# Patient Record
Sex: MALE | Race: WHITE | NOT HISPANIC OR LATINO | Employment: OTHER | URBAN - METROPOLITAN AREA
[De-identification: names, ages, dates, MRNs, and addresses within clinical notes are randomized per-mention and may not be internally consistent; named-entity substitution may affect disease eponyms.]

---

## 2019-06-14 RX ORDER — HYDROCHLOROTHIAZIDE 12.5 MG/1
TABLET ORAL
Refills: 0 | COMMUNITY
Start: 2019-03-22 | End: 2020-08-04

## 2019-06-14 RX ORDER — INSULIN ASPART 100 [IU]/ML
INJECTION, SOLUTION INTRAVENOUS; SUBCUTANEOUS
Refills: 3 | COMMUNITY
Start: 2019-04-15 | End: 2020-08-04

## 2019-06-14 RX ORDER — GLIPIZIDE 5 MG/1
TABLET ORAL
Refills: 3 | COMMUNITY
Start: 2019-03-31 | End: 2020-08-04

## 2019-06-14 RX ORDER — HYDROCHLOROTHIAZIDE 25 MG/1
TABLET ORAL
Refills: 1 | COMMUNITY
Start: 2019-04-02 | End: 2020-03-08

## 2019-06-17 ENCOUNTER — OFFICE VISIT (OUTPATIENT)
Dept: SURGERY | Facility: CLINIC | Age: 74
End: 2019-06-17
Payer: MEDICARE

## 2019-06-17 VITALS
DIASTOLIC BLOOD PRESSURE: 64 MMHG | SYSTOLIC BLOOD PRESSURE: 131 MMHG | TEMPERATURE: 98 F | HEART RATE: 88 BPM | BODY MASS INDEX: 32.35 KG/M2 | HEIGHT: 70 IN | WEIGHT: 226 LBS

## 2019-06-17 DIAGNOSIS — L05.01 PILONIDAL CYST WITH ABSCESS: Primary | ICD-10-CM

## 2019-06-17 LAB
ALBUMIN SERPL-MCNC: 3.7 G/DL (ref 3.1–4.7)
ALP SERPL-CCNC: 44 IU/L (ref 40–104)
ALT (SGPT): 19 IU/L (ref 3–33)
AST SERPL-CCNC: 17 IU/L (ref 10–40)
BASOPHILS NFR BLD: 0.1 K/UL (ref 0–0.2)
BASOPHILS NFR BLD: 0.4 %
BILIRUB SERPL-MCNC: 0.7 MG/DL (ref 0.3–1)
BUN SERPL-MCNC: 25 MG/DL (ref 8–20)
CALCIUM SERPL-MCNC: 9.4 MG/DL (ref 7.7–10.4)
CHLORIDE: 97 MMOL/L (ref 98–110)
CO2 SERPL-SCNC: 27.9 MMOL/L (ref 22.8–31.6)
CREATININE: 1.16 MG/DL (ref 0.6–1.4)
EOSINOPHIL NFR BLD: 0.1 K/UL (ref 0–0.7)
EOSINOPHIL NFR BLD: 0.7 %
ERYTHROCYTE [DISTWIDTH] IN BLOOD BY AUTOMATED COUNT: 12 % (ref 11.7–14.9)
GLUCOSE: 246 MG/DL (ref 70–99)
GRAN #: 10.2 K/UL (ref 1.4–6.5)
GRAN%: 78.7 %
HCT VFR BLD AUTO: 43.9 % (ref 39–55)
HGB BLD-MCNC: 14.9 G/DL (ref 14–16)
IMMATURE GRANS (ABS): 0.1 K/UL (ref 0–1)
IMMATURE GRANULOCYTES: 0.5 %
LYMPH #: 1.3 K/UL (ref 1.2–3.4)
LYMPH%: 9.9 %
MCH RBC QN AUTO: 32.2 PG (ref 25–35)
MCHC RBC AUTO-ENTMCNC: 33.9 G/DL (ref 31–36)
MCV RBC AUTO: 94.8 FL (ref 80–100)
MONO #: 1.3 K/UL (ref 0.1–0.6)
MONO%: 9.8 %
NUCLEATED RBCS: 0 %
PLATELET # BLD AUTO: 226 K/UL (ref 140–440)
PMV BLD AUTO: 9.4 FL (ref 8.8–12.7)
POTASSIUM SERPL-SCNC: 4.6 MMOL/L (ref 3.5–5)
PROT SERPL-MCNC: 7.9 G/DL (ref 6–8.2)
RBC # BLD AUTO: 4.63 M/UL (ref 4.3–5.9)
SODIUM: 139 MMOL/L (ref 134–144)
WBC # BLD AUTO: 13 K/UL (ref 5–10)

## 2019-06-17 PROCEDURE — 99203 PR OFFICE/OUTPT VISIT, NEW, LEVL III, 30-44 MIN: ICD-10-PCS | Mod: ,,, | Performed by: SURGERY

## 2019-06-17 PROCEDURE — 1101F PR PT FALLS ASSESS DOC 0-1 FALLS W/OUT INJ PAST YR: ICD-10-PCS | Mod: ,,, | Performed by: SURGERY

## 2019-06-17 PROCEDURE — 99203 OFFICE O/P NEW LOW 30 MIN: CPT | Mod: ,,, | Performed by: SURGERY

## 2019-06-17 PROCEDURE — 1101F PT FALLS ASSESS-DOCD LE1/YR: CPT | Mod: ,,, | Performed by: SURGERY

## 2019-06-17 RX ORDER — AMLODIPINE AND BENAZEPRIL HYDROCHLORIDE 5; 20 MG/1; MG/1
CAPSULE ORAL
COMMUNITY
Start: 2019-04-06 | End: 2020-08-04

## 2019-06-17 RX ORDER — HYDROCODONE BITARTRATE AND ACETAMINOPHEN 5; 325 MG/1; MG/1
1 TABLET ORAL EVERY 6 HOURS PRN
Refills: 0 | COMMUNITY
Start: 2019-06-13 | End: 2020-08-04

## 2019-06-17 RX ORDER — SULFAMETHOXAZOLE AND TRIMETHOPRIM 800; 160 MG/1; MG/1
1 TABLET ORAL 2 TIMES DAILY
Refills: 0 | COMMUNITY
Start: 2019-06-13 | End: 2019-06-25 | Stop reason: ALTCHOICE

## 2019-06-17 RX ORDER — PIOGLITAZONEHYDROCHLORIDE 15 MG/1
TABLET ORAL
COMMUNITY
Start: 2019-04-06 | End: 2020-08-04

## 2019-06-17 RX ORDER — SIMVASTATIN 20 MG/1
20 TABLET, FILM COATED ORAL NIGHTLY
COMMUNITY
Start: 2019-06-16 | End: 2020-08-04 | Stop reason: SDUPTHER

## 2019-06-17 RX ORDER — METFORMIN HYDROCHLORIDE 500 MG/1
TABLET, EXTENDED RELEASE ORAL
Refills: 3 | COMMUNITY
Start: 2019-04-29 | End: 2019-07-19 | Stop reason: SDUPTHER

## 2019-06-17 NOTE — LETTER
June 17, 2019      Sylvester Lugo III, MD  [address]  [address]  [place] [address]           [facility] - General Surgery  [address]  [place] [address]  Phone: [phone]  Fax: [phone]          Patient: Fazal Wade   MR Number: [id number]   YOB: 1945   Date of Visit: 6/17/2019       Dear Dr. Sylvester Lugo III:    Thank you for referring Fazal Wade to me for evaluation. Attached you will find relevant portions of my assessment and plan of care.    If you have questions, please do not hesitate to call me. I look forward to following Fazal Wade along with you.    Sincerely,    Rayo GONZALEZ MD    Enclosure  CC:  No Recipients    If you would like to receive this communication electronically, please contact [email or web address] or [phone] to request more information on [employer] Link access.    For providers and/or their staff who would like to refer a patient to [facility], please contact us through our one-stop-shop provider referral line, [facility], at [phone].    If you feel you have received this communication in error or would no longer like to receive these types of communications, please e-mail [email or web address]

## 2019-06-17 NOTE — PROGRESS NOTES
Subjective:       Patient ID: Fazal Wade is a 73 y.o. male.    Chief Complaint: Other (Referred by  to eval pilonidal cyst)      HPI:  Jose presents for evaluation of infected pilonidal cyst. Has been present since late last week. Started on antibiotics by primary care physician however fail to improve. Reports possible slight fever. Complains of local pain.    Past Medical History:   Diagnosis Date    Diabetes mellitus, type 2     Hyperlipidemia     Hypertension      History reviewed. No pertinent surgical history.  Review of patient's allergies indicates:   Allergen Reactions    Levaquin [levofloxacin]      Medication List with Changes/Refills   Current Medications    AMLODIPINE-BENAZEPRIL 5-20 MG (LOTREL) 5-20 MG PER CAPSULE        GLIPIZIDE (GLUCOTROL) 5 MG TABLET        HYDROCHLOROTHIAZIDE (HYDRODIURIL) 12.5 MG TAB        HYDROCHLOROTHIAZIDE (HYDRODIURIL) 25 MG TABLET        HYDROCODONE-ACETAMINOPHEN (NORCO) 5-325 MG PER TABLET    Take 1 tablet by mouth every 6 (six) hours as needed.    METFORMIN (GLUCOPHAGE-XR) 500 MG 24 HR TABLET        NOVOLOG U-100 INSULIN ASPART 100 UNIT/ML INJECTION        PIOGLITAZONE (ACTOS) 15 MG TABLET        SIMVASTATIN (ZOCOR) 20 MG TABLET        SULFAMETHOXAZOLE-TRIMETHOPRIM 800-160MG (BACTRIM DS) 800-160 MG TAB    Take 1 tablet by mouth 2 (two) times daily.     Family History   Problem Relation Age of Onset    Hypertension Mother     Diabetes Mother     Diabetes Father     Hypertension Father      Social History     Socioeconomic History    Marital status: Single     Spouse name: Not on file    Number of children: Not on file    Years of education: Not on file    Highest education level: Not on file   Occupational History    Not on file   Social Needs    Financial resource strain: Not on file    Food insecurity:     Worry: Not on file     Inability: Not on file    Transportation needs:     Medical: Not on file     Non-medical: Not on file   Tobacco  Use    Smoking status: Never Smoker    Smokeless tobacco: Never Used   Substance and Sexual Activity    Alcohol use: Never     Frequency: Never    Drug use: Never    Sexual activity: Not on file   Lifestyle    Physical activity:     Days per week: Not on file     Minutes per session: Not on file    Stress: Not on file   Relationships    Social connections:     Talks on phone: Not on file     Gets together: Not on file     Attends [employer] service: Not on file     Active member of club or organization: Not on file     Attends meetings of clubs or organizations: Not on file     Relationship status: Not on file   Other Topics Concern    Not on file   Social History Narrative    Not on file         Review of Systems   Constitutional: Negative for appetite change, chills, fever and unexpected weight change.   HENT: Negative for hearing loss, rhinorrhea, sore throat and voice change.    Eyes: Negative for photophobia and visual disturbance.   Respiratory: Negative for cough, choking and shortness of breath.    Cardiovascular: Negative for chest pain, palpitations and leg swelling.   Gastrointestinal: Positive for rectal pain. Negative for abdominal pain, blood in stool, constipation, diarrhea, nausea and vomiting.   Endocrine: Negative for cold intolerance, heat intolerance and polyphagia.   Genitourinary: Negative for dysuria.   Musculoskeletal: Negative for arthralgias and back pain.   Skin: Negative for color change.   Neurological: Negative for dizziness, seizures, syncope and headaches.   Hematological: Negative for adenopathy. Does not bruise/bleed easily.       Objective:      Physical Exam   Constitutional: He appears well-developed and well-nourished.  Non-toxic appearance. No distress.   HENT:   Head: Normocephalic and atraumatic. Head is without abrasion and without laceration.   Right Ear: External ear normal.   Left Ear: External ear normal.   Nose: Nose normal.   Mouth/Throat: Oropharynx is clear  and moist.   Eyes: Pupils are equal, round, and reactive to light. EOM are normal.   Neck: Trachea normal. No tracheal deviation and normal range of motion present. No thyroid mass and no thyromegaly present.   Cardiovascular: Normal rate and regular rhythm.   Pulmonary/Chest: Effort normal. No accessory muscle usage. No tachypnea. No respiratory distress.   Abdominal: Soft. Normal appearance and bowel sounds are normal. He exhibits no distension and no mass. There is no hepatosplenomegaly. There is no tenderness. There is no tenderness at McBurney's point and negative Monroy's sign. No hernia.   Lymphadenopathy:     He has no cervical adenopathy.     He has no axillary adenopathy.        Right: No inguinal adenopathy present.        Left: No inguinal adenopathy present.   Neurological: He is alert. Coordination and gait normal.   Skin: Skin is warm and intact.   Erythema and induration to the left of the intragluteal cleft. Changes consistent with pilonidal cyst. Very tender. Pinhole opening in the gluteal cleft which is draining profound pus when manipulated.   Psychiatric: He has a normal mood and affect. His speech is normal and behavior is normal.       Assessment/Plan:   Pilonidal cyst with abscess  -     Ambulatory Referral to External Surgery  -     CBC auto differential; Future; Expected date: 06/17/2019  -     Comprehensive metabolic panel; Future; Expected date: 06/17/2019  -     EKG 12-lead; Future  -     X-Ray Chest PA And Lateral        Planned procedure: Incision and drainage in the operating room    Ancef 2 gm IV on call to OR unless allergic, if allergic use Vancomycin per pharmacy dosing    NPO past midnight    Elmer cloth scrub per protocol    SCDs Bilateral Lower Extremities    I discussed the proposed procedures the the patient including risks, benefits, indications, alternatives and special concerns.  The patient appears to understand and agrees to go ahead with surgery.  I have made no  promises, warranties or verbal agreements beyond what was discussed above.    No follow-ups on file.

## 2019-06-23 LAB
BETA LACTAMASE: NORMAL

## 2019-06-25 ENCOUNTER — OFFICE VISIT (OUTPATIENT)
Dept: SURGERY | Facility: CLINIC | Age: 74
End: 2019-06-25
Payer: MEDICARE

## 2019-06-25 VITALS
BODY MASS INDEX: 32.35 KG/M2 | SYSTOLIC BLOOD PRESSURE: 127 MMHG | WEIGHT: 226 LBS | HEIGHT: 70 IN | TEMPERATURE: 98 F | DIASTOLIC BLOOD PRESSURE: 82 MMHG | HEART RATE: 68 BPM

## 2019-06-25 DIAGNOSIS — L05.01 PILONIDAL CYST WITH ABSCESS: Primary | ICD-10-CM

## 2019-06-25 PROCEDURE — 99024 PR POST-OP FOLLOW-UP VISIT: ICD-10-PCS | Mod: ,,, | Performed by: SURGERY

## 2019-06-25 PROCEDURE — 99024 POSTOP FOLLOW-UP VISIT: CPT | Mod: ,,, | Performed by: SURGERY

## 2019-06-25 NOTE — LETTER
June 25, 2019      Sylvester Lugo III, MD  [address] [facility] [facility]  [address]  [place] [address]           [facility] - General Surgery  [address]  [place] [address]  Phone: [phone]  Fax: [phone]          Patient: Fazal Wade   MR Number: [id number]   YOB: 1945   Date of Visit: 6/25/2019       Dear Dr. Sylvester Lugo III:    Thank you for referring Fazal Wade to me for evaluation. Attached you will find relevant portions of my assessment and plan of care.    If you have questions, please do not hesitate to call me. I look forward to following Fazal Wade along with you.    Sincerely,    Rayo GONZALEZ MD    Enclosure  CC:  No Recipients    If you would like to receive this communication electronically, please contact [email or web address] or [phone] to request more information on [employer] Link access.    For providers and/or their staff who would like to refer a patient to [facility], please contact us through our one-stop-shop provider referral line, [facility] [place], at [phone].    If you feel you have received this communication in error or would no longer like to receive these types of communications, please e-mail [email or web address]

## 2019-06-25 NOTE — PROGRESS NOTES
Subjective:       Patient ID: Fazal Wade is a 73 y.o. male.    Chief Complaint: Post-op Evaluation (FU DOS 6/17/2019 Incision and Drainage of Pilonidal Cyst Abscess)      HPI:  Fazal Wade is here for post-op. Patient has no systemic complaints. Post operative   pain is under control.  Tolerating diet, no nausea/vomiting.  Having normal bowel movements.        Objective:      Physical Exam   Constitutional: He is oriented to person, place, and time. He is cooperative. No distress.   Neurological: He is alert and oriented to person, place, and time.   Skin:   Incision is clean   There is no evidence of infection, hematoma or seroma.        Assessment/Plan:   Pilonidal cyst with abscess      Doing well post op      Follow up if symptoms worsen or fail to improve.

## 2019-10-04 ENCOUNTER — TELEPHONE (OUTPATIENT)
Dept: OPHTHALMOLOGY | Facility: CLINIC | Age: 74
End: 2019-10-04

## 2019-11-01 ENCOUNTER — TELEPHONE (OUTPATIENT)
Dept: OPHTHALMOLOGY | Facility: CLINIC | Age: 74
End: 2019-11-01

## 2019-11-01 NOTE — TELEPHONE ENCOUNTER
----- Message from Hugo Anguiano sent at 11/1/2019  9:45 AM CDT -----  Contact: pt  Pt is calling to schedule his appt with Dr. Moreno . Please give pt a call back at [phone] to schedule.

## 2019-11-05 ENCOUNTER — TELEPHONE (OUTPATIENT)
Dept: OPHTHALMOLOGY | Facility: CLINIC | Age: 74
End: 2019-11-05

## 2019-11-14 ENCOUNTER — TELEPHONE (OUTPATIENT)
Dept: OPHTHALMOLOGY | Facility: CLINIC | Age: 74
End: 2019-11-14

## 2019-11-14 NOTE — TELEPHONE ENCOUNTER
----- Message from Jeniffer Atwood sent at 11/14/2019  2:46 PM CST -----  Contact: Pt   Calling to reschedule canceled appt    Contact: [phone]

## 2019-11-21 ENCOUNTER — OFFICE VISIT (OUTPATIENT)
Dept: OPHTHALMOLOGY | Facility: CLINIC | Age: 74
End: 2019-11-21
Payer: MEDICARE

## 2019-11-21 ENCOUNTER — CLINICAL SUPPORT (OUTPATIENT)
Dept: OPHTHALMOLOGY | Facility: CLINIC | Age: 74
End: 2019-11-21
Payer: MEDICARE

## 2019-11-21 DIAGNOSIS — E08.319 DIABETIC RETINOPATHY OF RIGHT EYE WITHOUT MACULAR EDEMA ASSOCIATED WITH DIABETES MELLITUS DUE TO UNDERLYING CONDITION, UNSPECIFIED RETINOPATHY SEVERITY: ICD-10-CM

## 2019-11-21 DIAGNOSIS — H02.413 ACQUIRED MECHANICAL PTOSIS OF EYELID, BILATERAL: Primary | ICD-10-CM

## 2019-11-21 PROCEDURE — 92285 EXTERNAL PHOTOGRAPHY - OU - BOTH EYES: ICD-10-PCS | Mod: S$GLB,,, | Performed by: OPHTHALMOLOGY

## 2019-11-21 PROCEDURE — 92285 EXTERNAL OCULAR PHOTOGRAPHY: CPT | Mod: S$GLB,,, | Performed by: OPHTHALMOLOGY

## 2019-11-21 PROCEDURE — 92083 HUMPHREY VISUAL FIELD - OU - BOTH EYES: ICD-10-PCS | Mod: S$GLB,,, | Performed by: OPHTHALMOLOGY

## 2019-11-21 PROCEDURE — 99999 PR PBB SHADOW E&M-EST. PATIENT-LVL II: ICD-10-PCS | Mod: PBBFAC,,, | Performed by: OPHTHALMOLOGY

## 2019-11-21 PROCEDURE — 92083 EXTENDED VISUAL FIELD XM: CPT | Mod: S$GLB,,, | Performed by: OPHTHALMOLOGY

## 2019-11-21 PROCEDURE — 92004 COMPRE OPH EXAM NEW PT 1/>: CPT | Mod: S$GLB,,, | Performed by: OPHTHALMOLOGY

## 2019-11-21 PROCEDURE — 99499 RISK ADDL DX/OHS AUDIT: ICD-10-PCS | Mod: S$GLB,,, | Performed by: OPHTHALMOLOGY

## 2019-11-21 PROCEDURE — 99999 PR PBB SHADOW E&M-EST. PATIENT-LVL II: CPT | Mod: PBBFAC,,, | Performed by: OPHTHALMOLOGY

## 2019-11-21 PROCEDURE — 99499 UNLISTED E&M SERVICE: CPT | Mod: S$GLB,,, | Performed by: OPHTHALMOLOGY

## 2019-11-21 PROCEDURE — 92004 PR EYE EXAM, NEW PATIENT,COMPREHESV: ICD-10-PCS | Mod: S$GLB,,, | Performed by: OPHTHALMOLOGY

## 2019-11-21 NOTE — PROGRESS NOTES
HPI     Patient here for ptosis.  Referred by: Dr. Sylvester Lugo   Pt is professional !    How long have eyelid(s) been droopy?  A while   Any history of wearing hard contact lenses? Tried at one point, about 30   years ago, did not work out   Do eyelids feel heavy?  Yes  Does the height of the eyelid(s) fluctuate throughout the day? Yes, in the   morning, it is usually worse.   Do eyelid(s) interfere with daily activities such as driving, reading,   working? YES   Patient complaint(s): Pt states eyelids feel heavy and obscuring vision.   Pt states this runs in the family; dad has it and so does brother. Dry   eyes, feels likes something is in them.     Eye meds: None     Ocular Surgery:   S/p phaco w/IOL OU- Dr. Nimo Ferrer? 2-3 years ago      Ocular History:   None        Last edited by Tanvi Rodriguez on 11/21/2019  3:30 PM. (History)            Assessment /Plan     For exam results, see Encounter Report.    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  The patient has a history of bilateral cataract extraction approximately 2 years prior.  The bilateral upper eyelid heaviness affects his activities of daily living.    Pt. With significant improvement of superior visual fields with lids taped vs. Untaped.    Plan for bilateral upper eyelid mechanical ptosis repair in the minor procedure room with Valium.    Informed consent obtained after extensive risks/benefits/alternatives were discussed with the patient including but not limited to pain, bleeding, infection, ocular injury, loss of the eye, asymmetry, need for revision in future, scarring.   Alternatives such as waiting were discussed.  All questions were answered.      Hold ASA, NSAIDS, and fish oil 5 to 7 days prior to procedure.    Return for surgery     Strict bs and bp control

## 2019-11-21 NOTE — PROGRESS NOTES
PTOSIS VF DONE OU   EXT PIX DONE OU       PATIENT DENIES ANY ALLERGIES TO EYE PATCH/LATEX AT THIS TIME       JTHOMAS    DR FLYNN PATIENT

## 2019-11-21 NOTE — LETTER
November 21, 2019      Sylvester Lugo III, MD  [address] [facility]  [address]  [facility] [address]           [facility] - Ophthalmology  [address]  [place] [address]  Phone: [phone]  Fax: [phone]          Patient: Fazal Wade   MR Number: [id number]   YOB: 1945   Date of Visit: 11/21/2019       Dear Dr. Sylvester Lugo III:    Thank you for referring Fazal Wade to me for evaluation. Attached you will find relevant portions of my assessment and plan of care.    If you have questions, please do not hesitate to call me. I look forward to following Fazal Wade along with you.    Sincerely,    Hafsa Moreno MD    Enclosure  CC:  No Recipients    If you would like to receive this communication electronically, please contact [email or web address] or [phone] to request more information on [employer] Link access.    For providers and/or their staff who would like to refer a patient to [facility], please contact us through our one-stop-shop provider referral line, [facility], at [phone].    If you feel you have received this communication in error or would no longer like to receive these types of communications, please e-mail [email or web address]

## 2019-12-23 ENCOUNTER — TELEPHONE (OUTPATIENT)
Dept: OPHTHALMOLOGY | Facility: CLINIC | Age: 74
End: 2019-12-23

## 2019-12-31 ENCOUNTER — TELEPHONE (OUTPATIENT)
Dept: OPHTHALMOLOGY | Facility: CLINIC | Age: 74
End: 2019-12-31

## 2019-12-31 NOTE — TELEPHONE ENCOUNTER
----- Message from Tova Ferreira sent at 12/30/2019  9:10 AM CST -----      ----- Message -----  From: Sarika Sanchez  Sent: 12/30/2019   8:33 AM CST  To: Josh Pereyra Staff    Calling to resched appt from 12/30/19    Pt contact [phone]

## 2020-01-05 ENCOUNTER — HOSPITAL ENCOUNTER (EMERGENCY)
Facility: HOSPITAL | Age: 75
Discharge: HOME | End: 2020-01-05
Attending: EMERGENCY MEDICINE
Payer: MEDICARE

## 2020-01-05 VITALS
WEIGHT: 213 LBS | DIASTOLIC BLOOD PRESSURE: 67 MMHG | HEIGHT: 70 IN | RESPIRATION RATE: 19 BRPM | HEART RATE: 78 BPM | BODY MASS INDEX: 30.49 KG/M2 | OXYGEN SATURATION: 95 % | TEMPERATURE: 98 F | SYSTOLIC BLOOD PRESSURE: 144 MMHG

## 2020-01-05 DIAGNOSIS — R53.1 WEAKNESS: Primary | ICD-10-CM

## 2020-01-05 LAB
ALBUMIN SERPL BCP-MCNC: 3.3 G/DL (ref 3.5–5.2)
ALP SERPL-CCNC: 43 U/L (ref 55–135)
ALT SERPL W/O P-5'-P-CCNC: 27 U/L (ref 10–44)
ANION GAP SERPL CALC-SCNC: 11 MMOL/L (ref 8–16)
AST SERPL-CCNC: 26 U/L (ref 10–40)
BASOPHILS # BLD AUTO: 0.09 K/UL (ref 0–0.2)
BASOPHILS NFR BLD: 0.9 % (ref 0–1.9)
BILIRUB SERPL-MCNC: 0.8 MG/DL (ref 0.1–1)
BILIRUB UR QL STRIP: NEGATIVE
BUN SERPL-MCNC: 18 MG/DL (ref 8–23)
CALCIUM SERPL-MCNC: 9.1 MG/DL (ref 8.7–10.5)
CHLORIDE SERPL-SCNC: 96 MMOL/L (ref 95–110)
CLARITY UR: CLEAR
CO2 SERPL-SCNC: 25 MMOL/L (ref 23–29)
COLOR UR: YELLOW
CREAT SERPL-MCNC: 1 MG/DL (ref 0.5–1.4)
DIFFERENTIAL METHOD: ABNORMAL
EOSINOPHIL # BLD AUTO: 0.1 K/UL (ref 0–0.5)
EOSINOPHIL NFR BLD: 1.1 % (ref 0–8)
ERYTHROCYTE [DISTWIDTH] IN BLOOD BY AUTOMATED COUNT: 12.7 % (ref 11.5–14.5)
EST. GFR  (AFRICAN AMERICAN): >60 ML/MIN/1.73 M^2
EST. GFR  (NON AFRICAN AMERICAN): >60 ML/MIN/1.73 M^2
GLUCOSE SERPL-MCNC: 182 MG/DL (ref 70–110)
GLUCOSE UR QL STRIP: NEGATIVE
HCT VFR BLD AUTO: 42.8 % (ref 40–54)
HGB BLD-MCNC: 14.4 G/DL (ref 14–18)
HGB UR QL STRIP: NEGATIVE
IMM GRANULOCYTES # BLD AUTO: 0.03 K/UL (ref 0–0.04)
IMM GRANULOCYTES NFR BLD AUTO: 0.3 % (ref 0–0.5)
INR PPP: 1.1
KETONES UR QL STRIP: NEGATIVE
LEUKOCYTE ESTERASE UR QL STRIP: NEGATIVE
LIPASE SERPL-CCNC: 116 U/L (ref 4–60)
LYMPHOCYTES # BLD AUTO: 4.8 K/UL (ref 1–4.8)
LYMPHOCYTES NFR BLD: 46.9 % (ref 18–48)
MAGNESIUM SERPL-MCNC: 1.3 MG/DL (ref 1.6–2.6)
MCH RBC QN AUTO: 30.8 PG (ref 27–31)
MCHC RBC AUTO-ENTMCNC: 33.6 G/DL (ref 32–36)
MCV RBC AUTO: 92 FL (ref 82–98)
MONOCYTES # BLD AUTO: 0.9 K/UL (ref 0.3–1)
MONOCYTES NFR BLD: 9.1 % (ref 4–15)
NEUTROPHILS # BLD AUTO: 4.3 K/UL (ref 1.8–7.7)
NEUTROPHILS NFR BLD: 41.7 % (ref 38–73)
NITRITE UR QL STRIP: NEGATIVE
NRBC BLD-RTO: 0 /100 WBC
PH UR STRIP: 5 [PH] (ref 5–8)
PLATELET # BLD AUTO: 239 K/UL (ref 150–350)
PMV BLD AUTO: 8.8 FL (ref 9.2–12.9)
POTASSIUM SERPL-SCNC: 4.1 MMOL/L (ref 3.5–5.1)
PROT SERPL-MCNC: 7.2 G/DL (ref 6–8.4)
PROT UR QL STRIP: NEGATIVE
PROTHROMBIN TIME: 13.4 SEC (ref 10.6–14.8)
RBC # BLD AUTO: 4.68 M/UL (ref 4.6–6.2)
SODIUM SERPL-SCNC: 132 MMOL/L (ref 136–145)
SP GR UR STRIP: 1.01 (ref 1–1.03)
TROPONIN I SERPL DL<=0.01 NG/ML-MCNC: <0.03 NG/ML
URN SPEC COLLECT METH UR: NORMAL
UROBILINOGEN UR STRIP-ACNC: NEGATIVE EU/DL
WBC # BLD AUTO: 10.23 K/UL (ref 3.9–12.7)

## 2020-01-05 PROCEDURE — 25500020 PHARM REV CODE 255: Performed by: EMERGENCY MEDICINE

## 2020-01-05 PROCEDURE — 80053 COMPREHEN METABOLIC PANEL: CPT

## 2020-01-05 PROCEDURE — 63600175 PHARM REV CODE 636 W HCPCS: Performed by: EMERGENCY MEDICINE

## 2020-01-05 PROCEDURE — 84484 ASSAY OF TROPONIN QUANT: CPT

## 2020-01-05 PROCEDURE — 96361 HYDRATE IV INFUSION ADD-ON: CPT

## 2020-01-05 PROCEDURE — 83690 ASSAY OF LIPASE: CPT

## 2020-01-05 PROCEDURE — 85610 PROTHROMBIN TIME: CPT

## 2020-01-05 PROCEDURE — 83735 ASSAY OF MAGNESIUM: CPT

## 2020-01-05 PROCEDURE — 85025 COMPLETE CBC W/AUTO DIFF WBC: CPT

## 2020-01-05 PROCEDURE — 96374 THER/PROPH/DIAG INJ IV PUSH: CPT | Mod: 59

## 2020-01-05 PROCEDURE — 99285 EMERGENCY DEPT VISIT HI MDM: CPT | Mod: 25

## 2020-01-05 PROCEDURE — 93005 ELECTROCARDIOGRAM TRACING: CPT

## 2020-01-05 PROCEDURE — 81003 URINALYSIS AUTO W/O SCOPE: CPT

## 2020-01-05 RX ORDER — HUMAN INSULIN 100 [IU]/ML
INJECTION, SUSPENSION SUBCUTANEOUS
COMMUNITY
Start: 2019-12-03 | End: 2020-01-05 | Stop reason: SDUPTHER

## 2020-01-05 RX ORDER — OSELTAMIVIR PHOSPHATE 75 MG/1
CAPSULE ORAL
COMMUNITY
Start: 2019-12-21 | End: 2020-08-04

## 2020-01-05 RX ORDER — MAGNESIUM SULFATE 1 G/100ML
1 INJECTION INTRAVENOUS ONCE
Status: COMPLETED | OUTPATIENT
Start: 2020-01-05 | End: 2020-01-05

## 2020-01-05 RX ORDER — SODIUM CHLORIDE 9 MG/ML
1000 INJECTION, SOLUTION INTRAVENOUS
Status: COMPLETED | OUTPATIENT
Start: 2020-01-05 | End: 2020-01-05

## 2020-01-05 RX ADMIN — SODIUM CHLORIDE 1000 ML: 900 INJECTION INTRAVENOUS at 12:01

## 2020-01-05 RX ADMIN — IOHEXOL 100 ML: 350 INJECTION, SOLUTION INTRAVENOUS at 02:01

## 2020-01-05 RX ADMIN — MAGNESIUM SULFATE IN DEXTROSE 1 G: 10 INJECTION, SOLUTION INTRAVENOUS at 03:01

## 2020-01-05 NOTE — ED PROVIDER NOTES
Encounter Date: 1/5/2020       History     Chief Complaint   Patient presents with    Headache     states had flu last month and does not feel like he has got his strength back    Nausea     Patient presents complaining of general malaise, headache, abdominal discomfort that is been ongoing for the last couple of weeks.  Patient states he had influenza during early December and seemed to recover but noticed over the last few weeks that he has persisting nausea headache and abdominal discomfort.  He denies any diarrhea.  No chest pain or shortness of breath.  At the worst symptoms are mild to moderate.  He denies fever.  Nothing really makes it better or worse.        Review of patient's allergies indicates:   Allergen Reactions    Levaquin [levofloxacin]      Past Medical History:   Diagnosis Date    Diabetes mellitus, type 2     Hyperlipidemia     Hypertension      History reviewed. No pertinent surgical history.  Family History   Problem Relation Age of Onset    Hypertension Mother     Diabetes Mother     Diabetes Father     Hypertension Father      Social History     Tobacco Use    Smoking status: Never Smoker    Smokeless tobacco: Never Used   Substance Use Topics    Alcohol use: Never     Frequency: Never    Drug use: Never     Review of Systems   Constitutional: Negative for fever.   Gastrointestinal: Positive for abdominal pain and nausea.   All other systems reviewed and are negative.      Physical Exam     Initial Vitals [01/05/20 1151]   BP Pulse Resp Temp SpO2   (!) 144/63 65 18 97.8 °F (36.6 °C) 97 %      MAP       --         Physical Exam    Nursing note and vitals reviewed.  Constitutional: He appears well-developed and well-nourished. He is not diaphoretic. No distress.   HENT:   Head: Normocephalic and atraumatic.   Mouth/Throat: Oropharynx is clear and moist.   Eyes: EOM are normal.   Neck: Normal range of motion. Neck supple.   Cardiovascular: Normal rate, regular rhythm, normal heart  sounds and intact distal pulses.   Pulmonary/Chest: Breath sounds normal. No respiratory distress.   Abdominal: Soft. Bowel sounds are normal.   Musculoskeletal: Normal range of motion.   Neurological: He is alert and oriented to person, place, and time.   Skin: Skin is warm and dry.   Psychiatric: He has a normal mood and affect. His behavior is normal. Judgment and thought content normal.         ED Course   Procedures  Labs Reviewed   COMPREHENSIVE METABOLIC PANEL - Abnormal; Notable for the following components:       Result Value    Sodium 132 (*)     Glucose 182 (*)     Albumin 3.3 (*)     Alkaline Phosphatase 43 (*)     All other components within normal limits   MAGNESIUM - Abnormal; Notable for the following components:    Magnesium 1.3 (*)     All other components within normal limits   CBC W/ AUTO DIFFERENTIAL - Abnormal; Notable for the following components:    MPV 8.8 (*)     All other components within normal limits   LIPASE - Abnormal; Notable for the following components:    Lipase 116 (*)     All other components within normal limits   TROPONIN I   PROTIME-INR          Imaging Results          X-ray Chest AP Portable (Final result)  Result time 01/05/20 12:59:26    Final result by Fouzia Monge MD (01/05/20 12:59:26)                 Narrative:    PROCEDURE:   XR CHEST AP PORTABLE  dated  1/5/2020 12:40 PM    CLINICAL HISTORY:   Male 74 years of age.   sob    TECHNIQUE: AP view of the chest obtained portably at 12:40 PM.    PREVIOUS STUDIES:  December 27, 2017    FINDINGS:    Cardiac and mediastinal contours are normal. Lungs are clear. 1+  centimeter density projecting over the left pulmonary apex is the  second costochondral calcification. There is no pleural effusion or  pneumothorax. Bones are unremarkable.      IMPRESSION:    No acute findings. Clear lungs. Normal size heart.    Electronically Signed by Fouzia Monge on 1/5/2020 1:18 PM                               Medical Decision Making:    Differential Diagnosis:   Considerations include electrolyte abnormalities, dehydration, acute kidney injury, less likely tumor or mass, less likely pancreatitis colitis or acute cholecystitis.  Awaiting blood work and imaging and I will reassess  ED Management:  I see no worrisome findings on patient's workup to explain his weakness and fatigue since the flu.  Patient has no cardiac findings troponin is negative EKG is normal sinus rhythm without acute ST changes.  Chest x-ray is clear.  Patient's head CT is normal CT of the abdomen pelvis within normal limits. Blood work within normal limits magnesium was replaced emergency department urinalysis is normal. At this time I advised patient should follow up with his primary care physician.  Patient be discharged stable condition.  Detailed return precautions discussed.                                 Clinical Impression:       ICD-10-CM ICD-9-CM   1. Weakness R53.1 780.79                             Lucas Maria MD  01/05/20 1528

## 2020-01-05 NOTE — ED NOTES
Pt discharged to home by self, VSS, no distress noted, discharge instructions reviewed with pt, pt verbalized understanding

## 2020-01-05 NOTE — ED NOTES
Pt presents to ED with complaint of headache, nausea, diarrhea, pt was diagnosed with the flu 2 1/2 weeks ago, pt recommended to come the ED by [facility] NP, pt is awake and alert without apparent distress, BM x1 today, complains of nausea without vomiting, abdominal pain 2/10

## 2020-01-27 ENCOUNTER — OFFICE VISIT (OUTPATIENT)
Dept: ALLERGY | Facility: CLINIC | Age: 75
End: 2020-01-27
Payer: MEDICARE

## 2020-01-27 VITALS
BODY MASS INDEX: 30.06 KG/M2 | SYSTOLIC BLOOD PRESSURE: 136 MMHG | DIASTOLIC BLOOD PRESSURE: 78 MMHG | HEIGHT: 70 IN | OXYGEN SATURATION: 97 % | HEART RATE: 88 BPM | WEIGHT: 210 LBS

## 2020-01-27 DIAGNOSIS — J31.0 CHRONIC RHINITIS: ICD-10-CM

## 2020-01-27 DIAGNOSIS — R05.3 CHRONIC COUGH: Primary | ICD-10-CM

## 2020-01-27 PROCEDURE — 1159F MED LIST DOCD IN RCRD: CPT | Mod: S$GLB,,, | Performed by: ALLERGY & IMMUNOLOGY

## 2020-01-27 PROCEDURE — 1101F PR PT FALLS ASSESS DOC 0-1 FALLS W/OUT INJ PAST YR: ICD-10-PCS | Mod: S$GLB,,, | Performed by: ALLERGY & IMMUNOLOGY

## 2020-01-27 PROCEDURE — 1159F PR MEDICATION LIST DOCUMENTED IN MEDICAL RECORD: ICD-10-PCS | Mod: S$GLB,,, | Performed by: ALLERGY & IMMUNOLOGY

## 2020-01-27 PROCEDURE — 99204 OFFICE O/P NEW MOD 45 MIN: CPT | Mod: S$GLB,,, | Performed by: ALLERGY & IMMUNOLOGY

## 2020-01-27 PROCEDURE — 99204 PR OFFICE/OUTPT VISIT, NEW, LEVL IV, 45-59 MIN: ICD-10-PCS | Mod: S$GLB,,, | Performed by: ALLERGY & IMMUNOLOGY

## 2020-01-27 PROCEDURE — 1101F PT FALLS ASSESS-DOCD LE1/YR: CPT | Mod: S$GLB,,, | Performed by: ALLERGY & IMMUNOLOGY

## 2020-01-27 RX ORDER — CETIRIZINE HYDROCHLORIDE 10 MG/1
10 TABLET ORAL DAILY
COMMUNITY
End: 2020-08-04

## 2020-01-27 RX ORDER — MONTELUKAST SODIUM 10 MG/1
10 TABLET ORAL NIGHTLY
Qty: 30 TABLET | Refills: 3 | Status: SHIPPED | OUTPATIENT
Start: 2020-01-27 | End: 2020-02-26

## 2020-01-27 RX ORDER — AZELASTINE 1 MG/ML
1 SPRAY, METERED NASAL 2 TIMES DAILY
Qty: 30 ML | Refills: 3 | Status: SHIPPED | OUTPATIENT
Start: 2020-01-27 | End: 2020-08-04

## 2020-01-27 NOTE — LETTER
January 27, 2020        Sylvester Lugo III, MD  [address] [facility]  [address]  [place] [address]             [facility] - Allergy  [address]  [address]  [place] [address]  Phone: [phone]  Fax: [phone]   Patient: Fazal Wade   MR Number: [id number]   YOB: 1945   Date of Visit: 1/27/2020       Dear Dr. Lugo:    Thank you for referring Fazal Wade to me for evaluation. Attached you will find relevant portions of my assessment and plan of care.    If you have questions, please do not hesitate to call me. I look forward to following Fazal Wade along with you.    Sincerely,      Jolynn Junior MD            CC  No Recipients    Enclosure

## 2020-01-27 NOTE — PROGRESS NOTES
"Subjective:       Patient ID: Fazal Wade is a 74 y.o. male.    Chief Complaint: Sinus Problem (sinus drip and dizziness. referred by . ); Allergic Rhinitis  (entire life had rhinitis, after being home a lot more than normal with dog and cat seems to get worse); and Cough (always dry, irritated by eating)    HPI     Pt presents as a consult from Dr. Lugo for cough and rhintis.     Cough:   Onset: since childhood   Sx: drip making him cough, chest denies wheeze and sob   Tx:vicks has helped. Never tried inhaler therapy.   Spirometry: last time > 10 years   - reports "borderline"    Rhinitis:  Onset: childhood  Sx: pnd- neg itching   Trigger: animal dander , barometric pressure   Has dogs x 5 years  Tx: fluticasone, vicks in the nose   Dogs do sleep in the bed    Neg carpet in apt.     Allergy testing: never     Review of Systems      General: neg unexpected weight changes, fevers, chills, night sweats, malaise  HEENT: see hpi, Neg eye pain, vision changes, ear drainage, nose bleeds, throat tightness, sores in the mouth  CV: Neg chest pain, palpitations, swelling  Resp: see hpi, neg shortness of breath, hemoptysis  GI: see hpi, neg dysphagia, night abdominal pain, reflux, chronic diarrhea, chronic constipation  Derm: See Hpi, neg new rash, neg flushing  Mu/sk: Neg joint pain, joint swelling   Psych: Neg anxiety  neuro: neg chronic headaches, muscle weakness  Endo: neg heat/cold intolerance, chronic fatigue    Objective:     Vitals:    01/27/20 1453   BP: 136/78   Pulse: 88   SpO2: 97%   Weight: 95.3 kg (210 lb)   Height: 5' 10" (1.778 m)   PF: 510 L/min        Physical Exam        General: no acute distress, well developed well nourished   HEENT:   Head:normocephalic atraumatic  Eyes: KIT, EOMI, Neg injection, scleral icterus, or conjunctival papillary hypertrophy.  Ears: tm clear bilaterally, normal canal  Nose: 2-3+ inferior turbinates pink, neg nasal polyps            Mucosa: moist            " Septal irritation: none   OP: mucus membranes moist, - cobblestoning, - PND, neg erythema or lesions  Neck: supple, Full range of motion, neg lymphadenopathy  Chest: full respiratory excursion no abnormal chest abnormality  Resp: clear to ascultation bilaterally  CV: RRR, neg MRG, brisk capillary refill  Abdomen: BS+, non tender, non distended  Ext:  Neg clubbing, cyanosis, pitting edema  Skin: Neg rashes or lesions  Lymph: neg supraclavicular, axillary     Assessment:       1. Chronic cough    2. Chronic rhinitis        Plan:       Chronic cough  -     Spirometry with/without bronchodilator; Future  -     montelukast (SINGULAIR) 10 mg tablet; Take 1 tablet (10 mg total) by mouth every evening.  Dispense: 30 tablet; Refill: 3  -     azelastine (ASTELIN) 137 mcg (0.1 %) nasal spray; 1 spray (137 mcg total) by Nasal route 2 (two) times daily.  Dispense: 30 mL; Refill: 3    Chronic rhinitis  -     azelastine (ASTELIN) 137 mcg (0.1 %) nasal spray; 1 spray (137 mcg total) by Nasal route 2 (two) times daily.  Dispense: 30 mL; Refill: 3            Chronic cough  Spirometry - hospital perform  Start montelukast 10 mg po qday   Consider ace inhibitor contributing towards cough. - however the drug is backordered and has not had it in about 4 weeks.     Consider lpr as a cause -    Chronic rhinitis  Skin prick test on procedure day inhalant   Start saline   Cont fluticasone   Start azelastine   Start montelukast       F/u in 6 weeks         Jolynn Junior M.D.  Allergy/Immunology  [facility]   [phone] phone  [phone] fax

## 2020-01-27 NOTE — PATIENT INSTRUCTIONS
Nose:  Saline first 1-2 times per day  Next azelastine 1 spray per nare twice per day - if symptoms worsen, may use up to 4 times per day - help with congestion and post nasal drip.   Then fluticasone 1 spray per nare twice per day    Nasal spray Technique:  Head down  Aim up and out   Spray don't sniff    Saline technique: arm and hammer simply saline, but any is fine.   Hold breath, spray, then blow out.     Start montelukast 10 mg daily.     Use cetirizine as needed, rexall is the same as zyrtec, or cetirizine.     Skin testing- to look for animal dander symptoms     Instructions For Skin Testing    Please HOLD all antihistamines (Benadryl, zyrtec, Claritin, loratidine, cetirizine, diphenhydramine, medications with pm in the name, Allegra, fexofenadine) 7 days prior to testing.     Please HOLD zantac, ranitidine, pepsid, famotidine 3 days prior to your testing.     Please HOLD azelastine, astelin, astepro, dymista three days prior to your skin testing    Please HOLD your Beta Blocker (ask the office if you are on one.) These medicines typically end in olol. HOLD the morning of skin testing.    Please HOLD clonidine the morning of skin testing.     Please HOLD any Tricyclic antidepressants 14 days prior to skin testing. Please consult your prescribing doctor prior to discontinuing this medication.     Please HOLD Seroquel 14 days prior to skin testing. Please consult your prescribing physician prior to stopping this medication.     After skin testing, you may resume taking your HELD medications.     You may CONTINUE Montelukast , Flonase, Fluticasone, Nasonex, or other intranasal steroid.       I would like for you to have spirometry to evaluate for asthma symptoms.       For barometric pressure changes      1 spray per nare as needed. Curtis and it is spicy.     Consider lotrel as a potential cause for cough.

## 2020-02-05 ENCOUNTER — TELEPHONE (OUTPATIENT)
Dept: ALLERGY | Facility: CLINIC | Age: 75
End: 2020-02-05

## 2020-02-05 ENCOUNTER — OFFICE VISIT (OUTPATIENT)
Dept: ALLERGY | Facility: CLINIC | Age: 75
End: 2020-02-05
Payer: MEDICARE

## 2020-02-05 VITALS
HEIGHT: 70 IN | HEART RATE: 80 BPM | OXYGEN SATURATION: 95 % | BODY MASS INDEX: 30.78 KG/M2 | SYSTOLIC BLOOD PRESSURE: 140 MMHG | WEIGHT: 215 LBS | DIASTOLIC BLOOD PRESSURE: 60 MMHG

## 2020-02-05 DIAGNOSIS — R25.1 TREMOR: ICD-10-CM

## 2020-02-05 DIAGNOSIS — R42 DIZZINESS: Primary | ICD-10-CM

## 2020-02-05 DIAGNOSIS — H93.13 TINNITUS OF BOTH EARS: ICD-10-CM

## 2020-02-05 DIAGNOSIS — R05.3 CHRONIC COUGH: ICD-10-CM

## 2020-02-05 DIAGNOSIS — J31.0 CHRONIC RHINITIS: ICD-10-CM

## 2020-02-05 PROCEDURE — 99214 OFFICE O/P EST MOD 30 MIN: CPT | Mod: S$GLB,,, | Performed by: ALLERGY & IMMUNOLOGY

## 2020-02-05 PROCEDURE — 1159F PR MEDICATION LIST DOCUMENTED IN MEDICAL RECORD: ICD-10-PCS | Mod: S$GLB,,, | Performed by: ALLERGY & IMMUNOLOGY

## 2020-02-05 PROCEDURE — 1101F PT FALLS ASSESS-DOCD LE1/YR: CPT | Mod: S$GLB,,, | Performed by: ALLERGY & IMMUNOLOGY

## 2020-02-05 PROCEDURE — 1101F PR PT FALLS ASSESS DOC 0-1 FALLS W/OUT INJ PAST YR: ICD-10-PCS | Mod: S$GLB,,, | Performed by: ALLERGY & IMMUNOLOGY

## 2020-02-05 PROCEDURE — 99214 PR OFFICE/OUTPT VISIT, EST, LEVL IV, 30-39 MIN: ICD-10-PCS | Mod: S$GLB,,, | Performed by: ALLERGY & IMMUNOLOGY

## 2020-02-05 PROCEDURE — 1159F MED LIST DOCD IN RCRD: CPT | Mod: S$GLB,,, | Performed by: ALLERGY & IMMUNOLOGY

## 2020-02-05 RX ORDER — HYDROCORTISONE AND ACETIC ACID 20.75; 10.375 MG/ML; MG/ML
3 SOLUTION AURICULAR (OTIC) 3 TIMES DAILY
Qty: 10 ML | Refills: 3 | Status: SHIPPED | OUTPATIENT
Start: 2020-02-05 | End: 2020-02-15

## 2020-02-05 NOTE — TELEPHONE ENCOUNTER
Radha @ [facility] pharmacy called bc otic solution with acetic acid and steroid is out of stock and on back order, changed it to acetic acid eye by itself, with dexamethasone or another similar to hydrocortisone. She will explain to pt. First she'll call around to see if [facility] had in stock and tf the rx

## 2020-02-05 NOTE — PATIENT INSTRUCTIONS
Use the ear drops twice per day x 10 days and see if this helps.     I will send a referral to ent and send a report to dr white about the tremor and dizziness.       Saline first 1-2 times per day   Then use azelastine don't sniff it as needed for congestion and post nasal drip  May use up to 4 times per day     Fluticasone next, 1 spray per nare twice per day, don't sniff it.       Stop the montelukast pills for now and let's see if this improves the ringing in the ears.     Follow up in 6 weeks.

## 2020-02-05 NOTE — Clinical Note
Pt having dizziness (off balance) and tinnitus (b/l), referred to ent. He is also having tremors when he writes his name per report. I didn't appreciate any carotid bruits on exam. Thanks

## 2020-02-05 NOTE — PROGRESS NOTES
"Subjective:       Patient ID: Fazal Wade is a 74 y.o. male.    Chief Complaint: Dizziness (feels like meds are making him dizzy, but head spinning/dizzy. Cough and nose cleared up though. Tried a diff med without antihistamine an was a little better)    HPI     Pt presents for dizziness    Lately, he feels like the room is spinning when he stands from sitting. Feels like his ears are ringing.   He has also noticed tremors.  He states "my sinus symptoms are better, but my head isn't"    Not dizzy all the time. Notices when he starts walking.   No worsening of tinnitus, no presyncope.   Feels that he is losing his balance and may fall.   Describes how his head feels like "static on a radio" can't think straight.   Hears "humming" in his head on both sides.         Cough:   Improved   Onset: since childhood   Sx: drip making him cough, chest denies wheeze and sob   Tx: montelukast- feels possibly making him dizzy. rexall possibly helping.   vicks has helped. Never tried inhaler therapy.   Spirometry: last time > 10 years   - reports "borderline"    Rhinitis:  imporved other than dizziness.   Onset: childhood  Sx: pnd- neg itching   Trigger: animal dander , barometric pressure   Has dogs x 5 years  Tx: saline, azelastine, fluticasone.   fluticasone, vicks in the nose   Dogs do sleep in the bed    Neg carpet in apt.     Allergy testing: never - scheduled for     Review of Systems      General: neg unexpected weight changes, fevers, chills, night sweats, malaise  HEENT: see hpi, Neg eye pain, vision changes, ear drainage, nose bleeds, throat tightness, sores in the mouth  CV: Neg chest pain, palpitations, swelling  Resp: see hpi, neg shortness of breath, hemoptysis  GI: see hpi, neg dysphagia, night abdominal pain, reflux, chronic diarrhea, chronic constipation  Derm: See Hpi, neg new rash, neg flushing  Mu/sk: Neg joint pain, joint swelling   Psych: Neg anxiety  neuro: neg chronic headaches, muscle " "weakness  Endo: neg heat/cold intolerance, chronic fatigue    Objective:     Vitals:    02/05/20 1417   BP: (!) 140/60   Pulse: 80   SpO2: 95%   Weight: 97.5 kg (215 lb)   Height: 5' 10" (1.778 m)        Physical Exam        General: no acute distress, well developed well nourished   HEENT:   Head:normocephalic atraumatic  Eyes: KIT, EOMI, Neg injection, scleral icterus, or conjunctival papillary hypertrophy.  Ears: tm clear bilaterally- left tm with some erthema along bony prominence but no exudate behind ear drum, normal canal  Nose: 2-3+ inferior turbinates pink, neg nasal polyps            Mucosa: moist            Septal irritation: none   OP: mucus membranes moist, - cobblestoning, - PND, neg erythema or lesions  Neck: supple, Full range of motion, neg lymphadenopathy  Chest: full respiratory excursion no abnormal chest abnormality  Resp: clear to ascultation bilaterally  CV: RRR, neg MRG, brisk capillary refill, no rbuits appreciated.   Abdomen: BS+, non tender, non distended  Ext:  Neg clubbing, cyanosis, pitting edema  Skin: Neg rashes or lesions  Lymph: neg supraclavicular, axillary     Assessment:       1. Dizziness    2. Tremor    3. Chronic cough    4. Chronic rhinitis    5. Tinnitus of both ears        Plan:       Dizziness  -     Ambulatory referral/consult to ENT; Future; Expected date: 02/12/2020    Tremor    Chronic cough    Chronic rhinitis    Tinnitus of both ears  -     acetic acid-hydrocortisone (VOSOL-HC) otic solution; Place 3 drops into both ears 3 (three) times daily. for 10 days  Dispense: 10 mL; Refill: 3  -     Ambulatory referral/consult to ENT; Future; Expected date: 02/12/2020            Chronic cough- improved   Spirometry - hospital perform- not done yet  montelukast 10 mg po change from daily to prn   Consider ace inhibitor contributing towards cough. - however the drug is backordered and has not had it in about 4 weeks.   Recently received his bp meds 3 days ago     Consider lpr " as a cause - for cough but pt states this is better now.     Chronic rhinitis  Skin prick test on procedure day inhalant   saline   Cont fluticasone   azelastine   montelukast prn     Dizziness-  consider pcp to doppler the carotids. - no bruits appreciated on exam.   ent to evaluate for vertigo and tinnitus.   Neurology to evaluate for tremors is recommended, notices this when he writes his name.     Tinnitus - ent referral.       F/u in 6 weeks         Jolynn Junior M.D.  Allergy/Immunology  [facility]   [phone] phone  [phone] fax

## 2020-02-10 ENCOUNTER — TELEPHONE (OUTPATIENT)
Dept: OPHTHALMOLOGY | Facility: CLINIC | Age: 75
End: 2020-02-10

## 2020-02-10 NOTE — TELEPHONE ENCOUNTER
----- Message from Wendie Sheldon sent at 2/10/2020  9:42 AM CST -----  Contact: Fazal Benjamin called to request further instructions on his Rx . [phone]

## 2020-02-10 NOTE — TELEPHONE ENCOUNTER
Patient doesn't know when to take valium. Patient informed to bring valium with him to procedure. The nurse will instruct him on when to take medication. No further questions/concerns.

## 2020-02-17 ENCOUNTER — TELEPHONE (OUTPATIENT)
Dept: OPHTHALMOLOGY | Facility: CLINIC | Age: 75
End: 2020-02-17

## 2020-02-17 ENCOUNTER — PROCEDURE VISIT (OUTPATIENT)
Dept: OPHTHALMOLOGY | Facility: CLINIC | Age: 75
End: 2020-02-17
Payer: MEDICARE

## 2020-02-17 VITALS — DIASTOLIC BLOOD PRESSURE: 78 MMHG | SYSTOLIC BLOOD PRESSURE: 142 MMHG

## 2020-02-17 DIAGNOSIS — E08.319 DIABETIC RETINOPATHY OF RIGHT EYE WITHOUT MACULAR EDEMA ASSOCIATED WITH DIABETES MELLITUS DUE TO UNDERLYING CONDITION, UNSPECIFIED RETINOPATHY SEVERITY: ICD-10-CM

## 2020-02-17 DIAGNOSIS — H02.413 ACQUIRED MECHANICAL PTOSIS OF EYELID, BILATERAL: Primary | ICD-10-CM

## 2020-02-17 PROCEDURE — 99499 UNLISTED E&M SERVICE: CPT | Mod: S$GLB,,, | Performed by: OPHTHALMOLOGY

## 2020-02-17 PROCEDURE — 99499 NO LOS: ICD-10-PCS | Mod: S$GLB,,, | Performed by: OPHTHALMOLOGY

## 2020-02-17 PROCEDURE — 15823 PR REV UPPER EYELID W EXCESS SKIN: ICD-10-PCS | Mod: 50,S$GLB,, | Performed by: OPHTHALMOLOGY

## 2020-02-17 PROCEDURE — 15823 BLEPHARP UPR EYELID XCSV SKN: CPT | Mod: 50,S$GLB,, | Performed by: OPHTHALMOLOGY

## 2020-02-17 RX ORDER — HYDROCODONE BITARTRATE AND ACETAMINOPHEN 5; 325 MG/1; MG/1
1 TABLET ORAL EVERY 6 HOURS PRN
Qty: 16 TABLET | Refills: 0 | Status: SHIPPED | OUTPATIENT
Start: 2020-02-17 | End: 2020-08-04

## 2020-02-17 RX ORDER — ACETAMINOPHEN 500 MG
500 TABLET ORAL
Status: DISCONTINUED | OUTPATIENT
Start: 2020-02-17 | End: 2020-08-04

## 2020-02-17 NOTE — PROGRESS NOTES
HPI     Patient here for bilateral blepharoplasty--consent confirmed.  Patient given 2 valium 2/17/2020 8:08am.  Topical lidocaine applied 2/17/2020 8:08am.    1. DM  2. Acquired mechanical ptosis of eyelid OU      Last edited by Tova Ferreira on 2/17/2020  9:05 AM. (History)            Assessment /Plan     For exam results, see Encounter Report.    Acquired mechanical ptosis of eyelid, bilateral    Diabetic retinopathy of right eye without macular edema associated with diabetes mellitus due to underlying condition, unspecified retinopathy severity      Here for bilateral upper eyelid blepharoplasty.     75 yo. Male with impairment of superior visual field secondary to bilateral upper eyelid mechanical ptosis. Informed consent obtained after extensive risks/benefits/alternatives were discussed with the patient including but not limited to pain, bleeding, infection, ocular injury, loss of the eye, asymmetry, need for revision in future, scarring.  Alternatives such as waiting were discussed.  All questions were answered.        The eyelids were marked with a marking pen, 7 mm centrally from the margin, 5 mm medially and 6 mm laterally, and from the brow 10 mm medially, centrally, and laterally. Local anesthetic was injected into both upper eyelids.The patient was prepped and draped in the usual sterile manner for ophthalmic plastic surgery. Corneal shields were placed in each eye. Attention was first placed to the right eyelid. A 4-0 Silk traction suture was placed into the eyelid. A #15 blade was used to incise the marked skin and then raise and remove the blepharoplasty skin, taking care to preserve the underlying orbicularis oculi. Hemostasis was achieved with bovie electrocautery. Sharp Danuta scissor were then used to make a small incision in the medial orbicularis, dissecting down into the medial upper fat pad. Cleveland scissors were used to enlarge the space. Monopolar cautery was used to remove the  superior septal fibers releasing the medial fat pad. A small pedicle of the medial fat pad was exposed, and electrocautery was used to excise this pedicle at its base. After hemostasis was ensured, a 6-0 plain gut running suture was used to close the skin of the incision. This procedure was repeated in its entirety on the left upper eyelid. The corneal shields were removed and traction sutures cut and removed. Tobradex ointment was applied to the wounds and onto the eye. The patient tolerated the procedure well. There were no complications.     Post-operative instructions were given to the patient.     Return in one week sooner any worsening.       Return to Dr. Ferrer for routine eye care.

## 2020-02-17 NOTE — TELEPHONE ENCOUNTER
----- Message from Alison Justin sent at 2/17/2020 12:25 PM CST -----  Contact: Fazal Benjamin calling to inform that the medication that was called in to his pharmacy :    [facility] [address] [facility]  [address] [facility] [address]  Phone: [phone] Fax: [phone]      Is out of stock and none of the [facility] in [place] have it.  They are requesting a substitute be called in. He can be reached at [phone].

## 2020-02-17 NOTE — TELEPHONE ENCOUNTER
Advised patient as soon as the Pharmacy reopens at 2:00 p.m we will change the Tobradex klaudia to Maxitrol.

## 2020-02-17 NOTE — TELEPHONE ENCOUNTER
----- Message from Jamel Carney sent at 2/17/2020 12:27 PM CST -----  Contact[facility]  [facility] pharmacy is calling to inform that some of the Rx's are out of stock.    [facility] [phone]   DOS 11/26/19  STRESS TEST  DIAGNOSIS THAT WAS USED R06 09   PLEASE USE R06 02 (SHORTNESS OF BREATH)     PLEASE CALL IVAN AT [facility] [email or web address] [facility] AND THIS CAN BE FIXED    HER # [phone]

## 2020-02-27 ENCOUNTER — OFFICE VISIT (OUTPATIENT)
Dept: OPHTHALMOLOGY | Facility: CLINIC | Age: 75
End: 2020-02-27
Payer: MEDICARE

## 2020-02-27 DIAGNOSIS — Z98.890 POST-OPERATIVE STATE: Primary | ICD-10-CM

## 2020-02-27 DIAGNOSIS — H02.413 ACQUIRED MECHANICAL PTOSIS OF EYELID, BILATERAL: ICD-10-CM

## 2020-02-27 DIAGNOSIS — E08.319 DIABETIC RETINOPATHY OF RIGHT EYE WITHOUT MACULAR EDEMA ASSOCIATED WITH DIABETES MELLITUS DUE TO UNDERLYING CONDITION, UNSPECIFIED RETINOPATHY SEVERITY: ICD-10-CM

## 2020-02-27 PROCEDURE — 99999 PR PBB SHADOW E&M-EST. PATIENT-LVL II: ICD-10-PCS | Mod: PBBFAC,,, | Performed by: OPHTHALMOLOGY

## 2020-02-27 PROCEDURE — 99024 PR POST-OP FOLLOW-UP VISIT: ICD-10-PCS | Mod: S$GLB,,, | Performed by: OPHTHALMOLOGY

## 2020-02-27 PROCEDURE — 99024 POSTOP FOLLOW-UP VISIT: CPT | Mod: S$GLB,,, | Performed by: OPHTHALMOLOGY

## 2020-02-27 PROCEDURE — 99999 PR PBB SHADOW E&M-EST. PATIENT-LVL II: CPT | Mod: PBBFAC,,, | Performed by: OPHTHALMOLOGY

## 2020-02-27 RX ORDER — NEOMYCIN SULFATE, POLYMYXIN B SULFATE, AND DEXAMETHASONE 3.5; 10000; 1 MG/G; [USP'U]/G; MG/G
OINTMENT OPHTHALMIC 2 TIMES DAILY
Qty: 1 TUBE | Refills: 1 | Status: SHIPPED | OUTPATIENT
Start: 2020-02-27 | End: 2020-08-04

## 2020-02-27 NOTE — PROGRESS NOTES
HPI     Pt here for 1 week post-op.  Pt denies any pain but states he feels he can not see small print as well   as he could before.  Date of surgery: 02/17/20  Oral antibiotics: No  Eye meds: Tobradex klaudia TID OU        Last edited by Gerald Escudero on 2/27/2020  8:44 AM. (History)            Assessment /Plan     For exam results, see Encounter Report.    Post-operative state  -     External/Slit Lamp Photography  -     neomycin-polymyxin-dexamethasone (MAXITROL) 3.5 mg/g-10,000 unit/g-0.1 % Oint; Place into both eyes 2 (two) times daily. 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 Post-operative instructions reviewed. Sutures dissolving. All questions answered.  Return in 5 weeks prn sooner any worsening of vision/symptoms or any concerns.    Return to Dr. Ferrer for routine eye care.

## 2020-03-03 ENCOUNTER — PROCEDURE VISIT (OUTPATIENT)
Dept: ALLERGY | Facility: CLINIC | Age: 75
End: 2020-03-03
Payer: MEDICARE

## 2020-03-03 VITALS
HEIGHT: 70 IN | HEART RATE: 73 BPM | SYSTOLIC BLOOD PRESSURE: 142 MMHG | OXYGEN SATURATION: 98 % | WEIGHT: 215 LBS | DIASTOLIC BLOOD PRESSURE: 71 MMHG | BODY MASS INDEX: 30.78 KG/M2

## 2020-03-03 DIAGNOSIS — J31.0 CHRONIC RHINITIS: ICD-10-CM

## 2020-03-03 PROCEDURE — 95004 PERQ TESTS W/ALRGNC XTRCS: CPT | Mod: S$GLB,,, | Performed by: ALLERGY & IMMUNOLOGY

## 2020-03-03 PROCEDURE — 95004 PR ALLERGY SKIN TESTS,ALLERGENS: ICD-10-PCS | Mod: S$GLB,,, | Performed by: ALLERGY & IMMUNOLOGY

## 2020-03-12 ENCOUNTER — TELEPHONE (OUTPATIENT)
Dept: ALLERGY | Facility: CLINIC | Age: 75
End: 2020-03-12

## 2020-03-12 NOTE — TELEPHONE ENCOUNTER
Pt presented for appt today for established pt follow up. Once asked for his co-pay he decided that he was feeling fine and did not want to pay just to let us know that he is feeling better.

## 2020-04-02 ENCOUNTER — TELEPHONE (OUTPATIENT)
Dept: OPHTHALMOLOGY | Facility: CLINIC | Age: 75
End: 2020-04-02

## 2020-04-02 NOTE — TELEPHONE ENCOUNTER
Called pt to reschedule his 1 month post op due to COVID-19 concerns. Offered to reschedule appointment to a later date but patient declined. He states he does not see why Dr Moreno needs to see him again since he is not having any problems since his surgery. He will call if any problems arise.

## 2020-07-20 DIAGNOSIS — R05.3 CHRONIC COUGH: ICD-10-CM

## 2020-07-21 RX ORDER — MONTELUKAST SODIUM 10 MG/1
TABLET ORAL
Qty: 30 TABLET | Refills: 0 | OUTPATIENT
Start: 2020-07-21

## 2020-08-07 PROBLEM — I10 HYPERTENSION: Status: ACTIVE | Noted: 2020-08-07

## 2020-08-07 PROBLEM — E78.5 HYPERLIPIDEMIA: Status: ACTIVE | Noted: 2020-08-07

## 2020-08-07 PROBLEM — Z79.4 TYPE 2 DIABETES MELLITUS WITHOUT COMPLICATION, WITH LONG-TERM CURRENT USE OF INSULIN: Status: ACTIVE | Noted: 2020-08-07

## 2020-08-07 PROBLEM — E11.9 TYPE 2 DIABETES MELLITUS WITHOUT COMPLICATION, WITH LONG-TERM CURRENT USE OF INSULIN: Status: ACTIVE | Noted: 2020-08-07

## 2020-10-19 ENCOUNTER — PES CALL (OUTPATIENT)
Dept: ADMINISTRATIVE | Facility: CLINIC | Age: 75
End: 2020-10-19

## 2020-11-05 ENCOUNTER — OFFICE VISIT (OUTPATIENT)
Dept: FAMILY MEDICINE | Facility: CLINIC | Age: 75
End: 2020-11-05
Payer: MEDICARE

## 2020-11-05 VITALS
TEMPERATURE: 98 F | DIASTOLIC BLOOD PRESSURE: 76 MMHG | HEART RATE: 58 BPM | SYSTOLIC BLOOD PRESSURE: 130 MMHG | HEIGHT: 69 IN | OXYGEN SATURATION: 97 % | WEIGHT: 232 LBS | BODY MASS INDEX: 34.36 KG/M2

## 2020-11-05 DIAGNOSIS — I10 HYPERTENSION, UNSPECIFIED TYPE: Primary | ICD-10-CM

## 2020-11-05 DIAGNOSIS — E78.5 HYPERLIPIDEMIA, UNSPECIFIED HYPERLIPIDEMIA TYPE: ICD-10-CM

## 2020-11-05 DIAGNOSIS — E11.9 TYPE 2 DIABETES MELLITUS WITHOUT COMPLICATION, WITH LONG-TERM CURRENT USE OF INSULIN: ICD-10-CM

## 2020-11-05 DIAGNOSIS — Z79.4 TYPE 2 DIABETES MELLITUS WITHOUT COMPLICATION, WITH LONG-TERM CURRENT USE OF INSULIN: ICD-10-CM

## 2020-11-05 DIAGNOSIS — Z79.82 ASPIRIN LONG-TERM USE: ICD-10-CM

## 2020-11-05 PROCEDURE — 3052F HG A1C>EQUAL 8.0%<EQUAL 9.0%: CPT | Mod: CPTII,S$GLB,, | Performed by: FAMILY MEDICINE

## 2020-11-05 PROCEDURE — 3288F FALL RISK ASSESSMENT DOCD: CPT | Mod: CPTII,S$GLB,, | Performed by: FAMILY MEDICINE

## 2020-11-05 PROCEDURE — 1126F PR PAIN SEVERITY QUANTIFIED, NO PAIN PRESENT: ICD-10-PCS | Mod: S$GLB,,, | Performed by: FAMILY MEDICINE

## 2020-11-05 PROCEDURE — G0008 ADMIN INFLUENZA VIRUS VAC: HCPCS | Mod: S$GLB,,, | Performed by: FAMILY MEDICINE

## 2020-11-05 PROCEDURE — 1100F PTFALLS ASSESS-DOCD GE2>/YR: CPT | Mod: CPTII,S$GLB,, | Performed by: FAMILY MEDICINE

## 2020-11-05 PROCEDURE — 3288F PR FALLS RISK ASSESSMENT DOCUMENTED: ICD-10-PCS | Mod: CPTII,S$GLB,, | Performed by: FAMILY MEDICINE

## 2020-11-05 PROCEDURE — 99499 RISK ADDL DX/OHS AUDIT: ICD-10-PCS | Mod: S$GLB,,, | Performed by: FAMILY MEDICINE

## 2020-11-05 PROCEDURE — 90694 VACC AIIV4 NO PRSRV 0.5ML IM: CPT | Mod: S$GLB,,, | Performed by: FAMILY MEDICINE

## 2020-11-05 PROCEDURE — 99499 UNLISTED E&M SERVICE: CPT | Mod: S$GLB,,, | Performed by: FAMILY MEDICINE

## 2020-11-05 PROCEDURE — 1159F PR MEDICATION LIST DOCUMENTED IN MEDICAL RECORD: ICD-10-PCS | Mod: S$GLB,,, | Performed by: FAMILY MEDICINE

## 2020-11-05 PROCEDURE — 99214 OFFICE O/P EST MOD 30 MIN: CPT | Mod: 25,S$GLB,, | Performed by: FAMILY MEDICINE

## 2020-11-05 PROCEDURE — 3052F PR MOST RECENT HEMOGLOBIN A1C LEVEL 8.0 - < 9.0%: ICD-10-PCS | Mod: CPTII,S$GLB,, | Performed by: FAMILY MEDICINE

## 2020-11-05 PROCEDURE — 99214 PR OFFICE/OUTPT VISIT, EST, LEVL IV, 30-39 MIN: ICD-10-PCS | Mod: 25,S$GLB,, | Performed by: FAMILY MEDICINE

## 2020-11-05 PROCEDURE — 1100F PR PT FALLS ASSESS DOC 2+ FALLS/FALL W/INJURY/YR: ICD-10-PCS | Mod: CPTII,S$GLB,, | Performed by: FAMILY MEDICINE

## 2020-11-05 PROCEDURE — G0008 FLU VACCINE - QUADRIVALENT - ADJUVANTED: ICD-10-PCS | Mod: S$GLB,,, | Performed by: FAMILY MEDICINE

## 2020-11-05 PROCEDURE — 1159F MED LIST DOCD IN RCRD: CPT | Mod: S$GLB,,, | Performed by: FAMILY MEDICINE

## 2020-11-05 PROCEDURE — 90694 FLU VACCINE - QUADRIVALENT - ADJUVANTED: ICD-10-PCS | Mod: S$GLB,,, | Performed by: FAMILY MEDICINE

## 2020-11-05 PROCEDURE — 1126F AMNT PAIN NOTED NONE PRSNT: CPT | Mod: S$GLB,,, | Performed by: FAMILY MEDICINE

## 2020-11-05 RX ORDER — METFORMIN HYDROCHLORIDE 500 MG/1
500 TABLET, EXTENDED RELEASE ORAL NIGHTLY
Qty: 90 TABLET | Refills: 1 | Status: SHIPPED | OUTPATIENT
Start: 2020-11-05 | End: 2021-02-04

## 2020-11-05 RX ORDER — BENAZEPRIL HYDROCHLORIDE 20 MG/1
20 TABLET ORAL DAILY
Qty: 90 TABLET | Refills: 1 | Status: SHIPPED | OUTPATIENT
Start: 2020-11-05 | End: 2021-02-04 | Stop reason: SDUPTHER

## 2020-11-05 RX ORDER — SIMVASTATIN 20 MG/1
20 TABLET, FILM COATED ORAL NIGHTLY
Qty: 90 TABLET | Refills: 1 | Status: SHIPPED | OUTPATIENT
Start: 2020-11-05 | End: 2021-02-04 | Stop reason: SDUPTHER

## 2020-11-05 RX ORDER — AMLODIPINE BESYLATE 5 MG/1
5 TABLET ORAL DAILY
Qty: 90 TABLET | Refills: 1 | Status: SHIPPED | OUTPATIENT
Start: 2020-11-05 | End: 2021-02-04 | Stop reason: SDUPTHER

## 2020-11-07 NOTE — PROGRESS NOTES
Needs refills of medications 90 day supplies.  Needs high-dose flu shot.  Prostate screening he is urinating okay.  No dysuria frequency urgency or hematuria.  PSA is 2.0.  Hyperlipidemia cholesterol is 131 HDL 33 LDL 81 tolerating medication.  Glucose 190 CMP is normal.  A1c is elevated at 8.3.  His weight is up some.  Off diet due to his son living with him.  Morning sugars 51453 cardiovascular no chest pain palpitations PND orthopnea.  No pedal edema.  Pulmonary no cough or sputum no dyspnea.  He refuses colonoscopy.  He will do a stool for blood.  He does have some hypoglycemia but he has a very strange eating schedule an insulin schedule he gets up at 3:00 a.m. and eats and takes his 70 30 insulin at that time needs hypoglycemic around 8:29 a.m. but does not eat again until noon.  His insulin is peaking showed him the insulin curves and discuss this with him.  He takes is 2nd shot at 1:23 a.m. in the afternoon in goes to bed about 9:00 a.m. to midnight at night.  Poor sleep.  Gets up at fiber 6:00 a.m. again.  He declines any medication for prostate though he does complain of a little bit of nocturia.    Physical examination vital signs are noted.  Overweight male no acute distress alert oriented x3.  Neck is without bruit no adenopathy supple no thyromegaly.  Chest clear to auscultation no wheezes or crackles no dyspnea.  Heart regular rate rhythm without murmur gallop or rub.  Abdomen bowel sounds are positive soft and nontender no hepatosplenomegaly no guarding or rebound.  Extremities are without edema positive pedal pulses.  Sensation intact.    Impression hypertension controlled.  BMI of 34 obesity.  Hyperlipidemia.  Good control.  Diabetes mellitus type 2 on insulin.  Aspirin use.    Plan microalbumin ordered.  Flu shot high-dose.  Shingles vaccine recommended.  In sure if it ordered and returned by the patient today.  Discussed his insulin showed him to look where to look at the insulin peaks.   Recommend if he is going to take his insulin at 3:00 a.m. then he will need to eat at around 8 or 9:00 a.m. when the insulin is peaking.  And not wait until noon.  He seems to understand this.  Follow-up in 3 months with labs.

## 2020-11-09 ENCOUNTER — TELEPHONE (OUTPATIENT)
Dept: FAMILY MEDICINE | Facility: CLINIC | Age: 75
End: 2020-11-09

## 2020-11-09 NOTE — TELEPHONE ENCOUNTER
Pt advised normal micro albumin and to  insure fit from office.  ----- Message from Sylvester Lugo III, MD sent at 11/7/2020 11:46 AM CST -----  FOLLOW-UP AS RECOMMENDED microalbumin is normal.  Insure fit was too old to be processed.  Needs to do a new 1.

## 2021-01-28 ENCOUNTER — TELEPHONE (OUTPATIENT)
Dept: FAMILY MEDICINE | Facility: CLINIC | Age: 76
End: 2021-01-28

## 2021-01-28 DIAGNOSIS — Z79.82 ASPIRIN LONG-TERM USE: ICD-10-CM

## 2021-01-28 DIAGNOSIS — E11.9 TYPE 2 DIABETES MELLITUS WITHOUT COMPLICATION, WITH LONG-TERM CURRENT USE OF INSULIN: Primary | ICD-10-CM

## 2021-01-28 DIAGNOSIS — Z79.4 TYPE 2 DIABETES MELLITUS WITHOUT COMPLICATION, WITH LONG-TERM CURRENT USE OF INSULIN: Primary | ICD-10-CM

## 2021-01-28 DIAGNOSIS — E78.5 HYPERLIPIDEMIA, UNSPECIFIED HYPERLIPIDEMIA TYPE: ICD-10-CM

## 2021-01-28 DIAGNOSIS — I10 HYPERTENSION, ESSENTIAL: ICD-10-CM

## 2021-01-30 ENCOUNTER — HOSPITAL ENCOUNTER (OUTPATIENT)
Facility: HOSPITAL | Age: 76
Discharge: HOME | End: 2021-02-01
Attending: EMERGENCY MEDICINE | Admitting: INTERNAL MEDICINE
Payer: MEDICARE

## 2021-01-30 DIAGNOSIS — I63.9 CVA (CEREBRAL VASCULAR ACCIDENT): ICD-10-CM

## 2021-01-30 DIAGNOSIS — I63.9 STROKE: ICD-10-CM

## 2021-01-30 DIAGNOSIS — H53.2 DIPLOPIA: Primary | ICD-10-CM

## 2021-01-30 LAB
ALBUMIN SERPL BCP-MCNC: 4 G/DL (ref 3.5–5.2)
ALP SERPL-CCNC: 59 U/L (ref 55–135)
ALT SERPL W/O P-5'-P-CCNC: 17 U/L (ref 10–44)
ANION GAP SERPL CALC-SCNC: 9 MMOL/L (ref 8–16)
AST SERPL-CCNC: 17 U/L (ref 10–40)
BASOPHILS # BLD AUTO: 0.06 K/UL (ref 0–0.2)
BASOPHILS NFR BLD: 0.6 % (ref 0–1.9)
BILIRUB SERPL-MCNC: 0.5 MG/DL (ref 0.1–1)
BILIRUB UR QL STRIP: NEGATIVE
BUN SERPL-MCNC: 15 MG/DL (ref 8–23)
CALCIUM SERPL-MCNC: 9.4 MG/DL (ref 8.7–10.5)
CHLORIDE SERPL-SCNC: 105 MMOL/L (ref 95–110)
CLARITY UR: CLEAR
CO2 SERPL-SCNC: 29 MMOL/L (ref 23–29)
COLOR UR: YELLOW
CREAT SERPL-MCNC: 1.2 MG/DL (ref 0.5–1.4)
DIFFERENTIAL METHOD: ABNORMAL
EOSINOPHIL # BLD AUTO: 0.3 K/UL (ref 0–0.5)
EOSINOPHIL NFR BLD: 2.4 % (ref 0–8)
ERYTHROCYTE [DISTWIDTH] IN BLOOD BY AUTOMATED COUNT: 12.4 % (ref 11.5–14.5)
EST. GFR  (AFRICAN AMERICAN): >60 ML/MIN/1.73 M^2
EST. GFR  (NON AFRICAN AMERICAN): 58 ML/MIN/1.73 M^2
GLUCOSE SERPL-MCNC: 143 MG/DL (ref 70–110)
GLUCOSE UR QL STRIP: NEGATIVE
HCT VFR BLD AUTO: 44.1 % (ref 40–54)
HGB BLD-MCNC: 15 G/DL (ref 14–18)
HGB UR QL STRIP: NEGATIVE
IMM GRANULOCYTES # BLD AUTO: 0.03 K/UL (ref 0–0.04)
IMM GRANULOCYTES NFR BLD AUTO: 0.3 % (ref 0–0.5)
KETONES UR QL STRIP: NEGATIVE
LEUKOCYTE ESTERASE UR QL STRIP: NEGATIVE
LYMPHOCYTES # BLD AUTO: 4.6 K/UL (ref 1–4.8)
LYMPHOCYTES NFR BLD: 42.7 % (ref 18–48)
MCH RBC QN AUTO: 32.4 PG (ref 27–31)
MCHC RBC AUTO-ENTMCNC: 34 G/DL (ref 32–36)
MCV RBC AUTO: 95 FL (ref 82–98)
MONOCYTES # BLD AUTO: 0.9 K/UL (ref 0.3–1)
MONOCYTES NFR BLD: 8.1 % (ref 4–15)
NEUTROPHILS # BLD AUTO: 5 K/UL (ref 1.8–7.7)
NEUTROPHILS NFR BLD: 45.9 % (ref 38–73)
NITRITE UR QL STRIP: NEGATIVE
NRBC BLD-RTO: 0 /100 WBC
PH UR STRIP: 7 [PH] (ref 5–8)
PLATELET # BLD AUTO: 208 K/UL (ref 150–350)
PMV BLD AUTO: 9.5 FL (ref 9.2–12.9)
POTASSIUM SERPL-SCNC: 4.4 MMOL/L (ref 3.5–5.1)
PROT SERPL-MCNC: 7.4 G/DL (ref 6–8.4)
PROT UR QL STRIP: NEGATIVE
RBC # BLD AUTO: 4.63 M/UL (ref 4.6–6.2)
SARS-COV-2 RDRP RESP QL NAA+PROBE: NEGATIVE
SODIUM SERPL-SCNC: 143 MMOL/L (ref 136–145)
SP GR UR STRIP: 1.02 (ref 1–1.03)
TSH SERPL DL<=0.005 MIU/L-ACNC: 1.15 UIU/ML (ref 0.4–4)
URN SPEC COLLECT METH UR: NORMAL
UROBILINOGEN UR STRIP-ACNC: NEGATIVE EU/DL
WBC # BLD AUTO: 10.77 K/UL (ref 3.9–12.7)

## 2021-01-30 PROCEDURE — 80053 COMPREHEN METABOLIC PANEL: CPT

## 2021-01-30 PROCEDURE — 96374 THER/PROPH/DIAG INJ IV PUSH: CPT

## 2021-01-30 PROCEDURE — 36415 COLL VENOUS BLD VENIPUNCTURE: CPT

## 2021-01-30 PROCEDURE — G0378 HOSPITAL OBSERVATION PER HR: HCPCS

## 2021-01-30 PROCEDURE — 84443 ASSAY THYROID STIM HORMONE: CPT

## 2021-01-30 PROCEDURE — 25000003 PHARM REV CODE 250: Performed by: NURSE PRACTITIONER

## 2021-01-30 PROCEDURE — 99285 EMERGENCY DEPT VISIT HI MDM: CPT | Mod: 25

## 2021-01-30 PROCEDURE — 85025 COMPLETE CBC W/AUTO DIFF WBC: CPT

## 2021-01-30 PROCEDURE — 25000003 PHARM REV CODE 250: Performed by: EMERGENCY MEDICINE

## 2021-01-30 PROCEDURE — 81003 URINALYSIS AUTO W/O SCOPE: CPT

## 2021-01-30 PROCEDURE — 93010 ELECTROCARDIOGRAM REPORT: CPT | Mod: ,,, | Performed by: INTERNAL MEDICINE

## 2021-01-30 PROCEDURE — U0002 COVID-19 LAB TEST NON-CDC: HCPCS

## 2021-01-30 PROCEDURE — 94760 N-INVAS EAR/PLS OXIMETRY 1: CPT

## 2021-01-30 PROCEDURE — 93010 EKG 12-LEAD: ICD-10-PCS | Mod: ,,, | Performed by: INTERNAL MEDICINE

## 2021-01-30 PROCEDURE — 93005 ELECTROCARDIOGRAM TRACING: CPT

## 2021-01-30 PROCEDURE — 63600175 PHARM REV CODE 636 W HCPCS: Performed by: EMERGENCY MEDICINE

## 2021-01-30 RX ORDER — ASPIRIN 325 MG
325 TABLET ORAL
Status: COMPLETED | OUTPATIENT
Start: 2021-01-30 | End: 2021-01-30

## 2021-01-30 RX ORDER — FAMOTIDINE 20 MG/1
20 TABLET, FILM COATED ORAL 2 TIMES DAILY
Status: DISCONTINUED | OUTPATIENT
Start: 2021-01-30 | End: 2021-02-01 | Stop reason: HOSPADM

## 2021-01-30 RX ORDER — ONDANSETRON 2 MG/ML
4 INJECTION INTRAMUSCULAR; INTRAVENOUS EVERY 8 HOURS PRN
Status: DISCONTINUED | OUTPATIENT
Start: 2021-01-30 | End: 2021-02-01 | Stop reason: HOSPADM

## 2021-01-30 RX ORDER — LORAZEPAM 2 MG/ML
1 INJECTION INTRAMUSCULAR
Status: COMPLETED | OUTPATIENT
Start: 2021-01-30 | End: 2021-01-30

## 2021-01-30 RX ORDER — LABETALOL HYDROCHLORIDE 5 MG/ML
10 INJECTION, SOLUTION INTRAVENOUS
Status: DISCONTINUED | OUTPATIENT
Start: 2021-01-30 | End: 2021-02-01 | Stop reason: HOSPADM

## 2021-01-30 RX ORDER — SIMVASTATIN 10 MG/1
20 TABLET, FILM COATED ORAL NIGHTLY
Status: DISCONTINUED | OUTPATIENT
Start: 2021-01-30 | End: 2021-02-01 | Stop reason: HOSPADM

## 2021-01-30 RX ORDER — ASPIRIN 325 MG
325 TABLET, DELAYED RELEASE (ENTERIC COATED) ORAL DAILY
Status: DISCONTINUED | OUTPATIENT
Start: 2021-01-31 | End: 2021-02-01 | Stop reason: HOSPADM

## 2021-01-30 RX ORDER — SODIUM CHLORIDE 0.9 % (FLUSH) 0.9 %
10 SYRINGE (ML) INJECTION
Status: DISCONTINUED | OUTPATIENT
Start: 2021-01-30 | End: 2021-02-01 | Stop reason: HOSPADM

## 2021-01-30 RX ADMIN — ASPIRIN 325 MG ORAL TABLET 325 MG: 325 PILL ORAL at 10:01

## 2021-01-30 RX ADMIN — FAMOTIDINE 20 MG: 20 TABLET ORAL at 11:01

## 2021-01-30 RX ADMIN — LORAZEPAM 1 MG: 2 INJECTION INTRAMUSCULAR; INTRAVENOUS at 09:01

## 2021-01-30 RX ADMIN — SIMVASTATIN 20 MG: 10 TABLET, FILM COATED ORAL at 11:01

## 2021-01-31 PROBLEM — E66.811 CLASS 1 OBESITY IN ADULT: Status: ACTIVE | Noted: 2021-01-31

## 2021-01-31 PROBLEM — E66.9 CLASS 1 OBESITY IN ADULT: Status: ACTIVE | Noted: 2021-01-31

## 2021-01-31 LAB
ALBUMIN SERPL BCP-MCNC: 3.4 G/DL (ref 3.5–5.2)
ALP SERPL-CCNC: 52 U/L (ref 55–135)
ALT SERPL W/O P-5'-P-CCNC: 14 U/L (ref 10–44)
ANION GAP SERPL CALC-SCNC: 8 MMOL/L (ref 8–16)
APTT BLDCRRT: 28.8 SEC (ref 21–32)
AST SERPL-CCNC: 14 U/L (ref 10–40)
BASOPHILS # BLD AUTO: 0.04 K/UL (ref 0–0.2)
BASOPHILS NFR BLD: 0.5 % (ref 0–1.9)
BILIRUB SERPL-MCNC: 0.3 MG/DL (ref 0.1–1)
BUN SERPL-MCNC: 15 MG/DL (ref 8–23)
CALCIUM SERPL-MCNC: 8.7 MG/DL (ref 8.7–10.5)
CHLORIDE SERPL-SCNC: 103 MMOL/L (ref 95–110)
CHOLEST SERPL-MCNC: 124 MG/DL (ref 120–199)
CHOLEST/HDLC SERPL: 4.3 {RATIO} (ref 2–5)
CK MB SERPL-MCNC: 3.6 NG/ML (ref 0.1–6.5)
CK MB SERPL-RTO: 2.7 % (ref 0–5)
CK SERPL-CCNC: 133 U/L (ref 20–200)
CO2 SERPL-SCNC: 28 MMOL/L (ref 23–29)
CREAT SERPL-MCNC: 1 MG/DL (ref 0.5–1.4)
CRP SERPL-MCNC: 1.8 MG/L (ref 0–8.2)
DIFFERENTIAL METHOD: ABNORMAL
EOSINOPHIL # BLD AUTO: 0.2 K/UL (ref 0–0.5)
EOSINOPHIL NFR BLD: 3 % (ref 0–8)
ERYTHROCYTE [DISTWIDTH] IN BLOOD BY AUTOMATED COUNT: 12.2 % (ref 11.5–14.5)
ERYTHROCYTE [SEDIMENTATION RATE] IN BLOOD BY WESTERGREN METHOD: 21 MM/HR (ref 0–10)
EST. GFR  (AFRICAN AMERICAN): >60 ML/MIN/1.73 M^2
EST. GFR  (NON AFRICAN AMERICAN): >60 ML/MIN/1.73 M^2
ESTIMATED AVG GLUCOSE: 174 MG/DL (ref 68–131)
GLUCOSE SERPL-MCNC: 199 MG/DL (ref 70–110)
HBA1C MFR BLD: 7.7 % (ref 4–5.6)
HCT VFR BLD AUTO: 40.3 % (ref 40–54)
HDLC SERPL-MCNC: 29 MG/DL (ref 40–75)
HDLC SERPL: 23.4 % (ref 20–50)
HGB BLD-MCNC: 13.7 G/DL (ref 14–18)
IMM GRANULOCYTES # BLD AUTO: 0.02 K/UL (ref 0–0.04)
IMM GRANULOCYTES NFR BLD AUTO: 0.3 % (ref 0–0.5)
INR PPP: 1.1 (ref 0.8–1.2)
LDLC SERPL CALC-MCNC: 71 MG/DL (ref 63–159)
LYMPHOCYTES # BLD AUTO: 3 K/UL (ref 1–4.8)
LYMPHOCYTES NFR BLD: 38.7 % (ref 18–48)
MAGNESIUM SERPL-MCNC: 1.5 MG/DL (ref 1.6–2.6)
MCH RBC QN AUTO: 32.2 PG (ref 27–31)
MCHC RBC AUTO-ENTMCNC: 34 G/DL (ref 32–36)
MCV RBC AUTO: 95 FL (ref 82–98)
MONOCYTES # BLD AUTO: 0.7 K/UL (ref 0.3–1)
MONOCYTES NFR BLD: 9.3 % (ref 4–15)
NEUTROPHILS # BLD AUTO: 3.7 K/UL (ref 1.8–7.7)
NEUTROPHILS NFR BLD: 48.2 % (ref 38–73)
NONHDLC SERPL-MCNC: 95 MG/DL
NRBC BLD-RTO: 0 /100 WBC
PHOSPHATE SERPL-MCNC: 4.8 MG/DL (ref 2.7–4.5)
PLATELET # BLD AUTO: 179 K/UL (ref 150–350)
PMV BLD AUTO: 9.6 FL (ref 9.2–12.9)
POCT GLUCOSE: 207 MG/DL (ref 70–110)
POCT GLUCOSE: 222 MG/DL (ref 70–110)
POCT GLUCOSE: 248 MG/DL (ref 70–110)
POTASSIUM SERPL-SCNC: 4 MMOL/L (ref 3.5–5.1)
PROT SERPL-MCNC: 6.4 G/DL (ref 6–8.4)
PROTHROMBIN TIME: 11.2 SEC (ref 9–12.5)
RBC # BLD AUTO: 4.25 M/UL (ref 4.6–6.2)
SODIUM SERPL-SCNC: 139 MMOL/L (ref 136–145)
TRIGL SERPL-MCNC: 120 MG/DL (ref 30–150)
TROPONIN I SERPL DL<=0.01 NG/ML-MCNC: 0.08 NG/ML (ref 0–0.03)
WBC # BLD AUTO: 7.75 K/UL (ref 3.9–12.7)

## 2021-01-31 PROCEDURE — 85610 PROTHROMBIN TIME: CPT

## 2021-01-31 PROCEDURE — 86341 ISLET CELL ANTIBODY: CPT

## 2021-01-31 PROCEDURE — 86140 C-REACTIVE PROTEIN: CPT

## 2021-01-31 PROCEDURE — 63600175 PHARM REV CODE 636 W HCPCS: Performed by: NURSE PRACTITIONER

## 2021-01-31 PROCEDURE — 82550 ASSAY OF CK (CPK): CPT

## 2021-01-31 PROCEDURE — 84100 ASSAY OF PHOSPHORUS: CPT

## 2021-01-31 PROCEDURE — 96375 TX/PRO/DX INJ NEW DRUG ADDON: CPT

## 2021-01-31 PROCEDURE — 83036 HEMOGLOBIN GLYCOSYLATED A1C: CPT

## 2021-01-31 PROCEDURE — 63600175 PHARM REV CODE 636 W HCPCS: Performed by: INTERNAL MEDICINE

## 2021-01-31 PROCEDURE — 85651 RBC SED RATE NONAUTOMATED: CPT

## 2021-01-31 PROCEDURE — 83735 ASSAY OF MAGNESIUM: CPT

## 2021-01-31 PROCEDURE — 25000003 PHARM REV CODE 250: Performed by: NURSE PRACTITIONER

## 2021-01-31 PROCEDURE — G0378 HOSPITAL OBSERVATION PER HR: HCPCS

## 2021-01-31 PROCEDURE — 36415 COLL VENOUS BLD VENIPUNCTURE: CPT

## 2021-01-31 PROCEDURE — 82553 CREATINE MB FRACTION: CPT

## 2021-01-31 PROCEDURE — 83519 RIA NONANTIBODY: CPT | Mod: 59

## 2021-01-31 PROCEDURE — 92610 EVALUATE SWALLOWING FUNCTION: CPT

## 2021-01-31 PROCEDURE — 96372 THER/PROPH/DIAG INJ SC/IM: CPT

## 2021-01-31 PROCEDURE — 80061 LIPID PANEL: CPT

## 2021-01-31 PROCEDURE — 84182 PROTEIN WESTERN BLOT TEST: CPT

## 2021-01-31 PROCEDURE — 84484 ASSAY OF TROPONIN QUANT: CPT

## 2021-01-31 PROCEDURE — 85730 THROMBOPLASTIN TIME PARTIAL: CPT

## 2021-01-31 PROCEDURE — 83520 IMMUNOASSAY QUANT NOS NONAB: CPT

## 2021-01-31 PROCEDURE — 80053 COMPREHEN METABOLIC PANEL: CPT

## 2021-01-31 PROCEDURE — 96376 TX/PRO/DX INJ SAME DRUG ADON: CPT

## 2021-01-31 PROCEDURE — 85025 COMPLETE CBC W/AUTO DIFF WBC: CPT

## 2021-01-31 PROCEDURE — 94761 N-INVAS EAR/PLS OXIMETRY MLT: CPT

## 2021-01-31 PROCEDURE — 25000003 PHARM REV CODE 250: Performed by: INTERNAL MEDICINE

## 2021-01-31 PROCEDURE — 83520 IMMUNOASSAY QUANT NOS NONAB: CPT | Mod: 59

## 2021-01-31 RX ORDER — INSULIN ASPART 100 [IU]/ML
0-5 INJECTION, SOLUTION INTRAVENOUS; SUBCUTANEOUS
Status: DISCONTINUED | OUTPATIENT
Start: 2021-01-31 | End: 2021-02-01 | Stop reason: HOSPADM

## 2021-01-31 RX ORDER — LANOLIN ALCOHOL/MO/W.PET/CERES
400 CREAM (GRAM) TOPICAL 2 TIMES DAILY
Status: DISCONTINUED | OUTPATIENT
Start: 2021-01-31 | End: 2021-02-01 | Stop reason: HOSPADM

## 2021-01-31 RX ORDER — IBUPROFEN 200 MG
24 TABLET ORAL
Status: DISCONTINUED | OUTPATIENT
Start: 2021-01-31 | End: 2021-02-01 | Stop reason: HOSPADM

## 2021-01-31 RX ORDER — MAGNESIUM SULFATE HEPTAHYDRATE 40 MG/ML
2 INJECTION, SOLUTION INTRAVENOUS ONCE
Status: COMPLETED | OUTPATIENT
Start: 2021-01-31 | End: 2021-01-31

## 2021-01-31 RX ORDER — GLUCAGON 1 MG
1 KIT INJECTION
Status: DISCONTINUED | OUTPATIENT
Start: 2021-01-31 | End: 2021-02-01 | Stop reason: HOSPADM

## 2021-01-31 RX ORDER — LORAZEPAM 2 MG/ML
1 INJECTION INTRAMUSCULAR ONCE AS NEEDED
Status: COMPLETED | OUTPATIENT
Start: 2021-01-31 | End: 2021-01-31

## 2021-01-31 RX ORDER — IBUPROFEN 200 MG
16 TABLET ORAL
Status: DISCONTINUED | OUTPATIENT
Start: 2021-01-31 | End: 2021-02-01 | Stop reason: HOSPADM

## 2021-01-31 RX ORDER — INSULIN ASPART 100 [IU]/ML
25 INJECTION, SUSPENSION SUBCUTANEOUS 2 TIMES DAILY WITH MEALS
Status: DISCONTINUED | OUTPATIENT
Start: 2021-01-31 | End: 2021-02-01 | Stop reason: HOSPADM

## 2021-01-31 RX ADMIN — INSULIN ASPART 25 UNITS: 100 INJECTION, SUSPENSION SUBCUTANEOUS at 04:01

## 2021-01-31 RX ADMIN — ASPIRIN 325 MG: 325 TABLET, COATED ORAL at 08:01

## 2021-01-31 RX ADMIN — FAMOTIDINE 20 MG: 20 TABLET ORAL at 08:01

## 2021-01-31 RX ADMIN — INSULIN ASPART 2 UNITS: 100 INJECTION, SOLUTION INTRAVENOUS; SUBCUTANEOUS at 11:01

## 2021-01-31 RX ADMIN — SIMVASTATIN 20 MG: 10 TABLET, FILM COATED ORAL at 08:01

## 2021-01-31 RX ADMIN — INSULIN ASPART 2 UNITS: 100 INJECTION, SOLUTION INTRAVENOUS; SUBCUTANEOUS at 04:01

## 2021-01-31 RX ADMIN — LORAZEPAM 1 MG: 2 INJECTION INTRAMUSCULAR; INTRAVENOUS at 01:01

## 2021-01-31 RX ADMIN — Medication 400 MG: at 08:01

## 2021-01-31 RX ADMIN — INSULIN ASPART 25 UNITS: 100 INJECTION, SUSPENSION SUBCUTANEOUS at 08:01

## 2021-01-31 RX ADMIN — Medication 400 MG: at 10:01

## 2021-01-31 RX ADMIN — MAGNESIUM SULFATE 2 G: 2 INJECTION INTRAVENOUS at 10:01

## 2021-02-01 VITALS
HEIGHT: 70 IN | WEIGHT: 238 LBS | RESPIRATION RATE: 16 BRPM | HEART RATE: 66 BPM | TEMPERATURE: 97 F | DIASTOLIC BLOOD PRESSURE: 92 MMHG | BODY MASS INDEX: 34.07 KG/M2 | OXYGEN SATURATION: 96 % | SYSTOLIC BLOOD PRESSURE: 181 MMHG

## 2021-02-01 PROBLEM — H53.2 DIPLOPIA: Status: ACTIVE | Noted: 2021-02-01

## 2021-02-01 PROBLEM — E11.9 DIABETES MELLITUS: Status: ACTIVE | Noted: 2021-02-01

## 2021-02-01 PROBLEM — I63.9 CVA (CEREBRAL VASCULAR ACCIDENT): Status: RESOLVED | Noted: 2021-01-30 | Resolved: 2021-02-01

## 2021-02-01 LAB
ALBUMIN SERPL BCP-MCNC: 3.6 G/DL (ref 3.5–5.2)
ALP SERPL-CCNC: 55 U/L (ref 55–135)
ALT SERPL W/O P-5'-P-CCNC: 14 U/L (ref 10–44)
ANION GAP SERPL CALC-SCNC: 10 MMOL/L (ref 8–16)
AORTIC ROOT ANNULUS: 3.73 CM
AORTIC VALVE CUSP SEPERATION: 1.72 CM
AST SERPL-CCNC: 14 U/L (ref 10–40)
AV INDEX (PROSTH): 1.04
AV MEAN GRADIENT: 3 MMHG
AV PEAK GRADIENT: 6 MMHG
AV VALVE AREA: 4.23 CM2
AV VELOCITY RATIO: 0.93
BASOPHILS # BLD AUTO: 0.07 K/UL (ref 0–0.2)
BASOPHILS NFR BLD: 0.8 % (ref 0–1.9)
BILIRUB SERPL-MCNC: 0.4 MG/DL (ref 0.1–1)
BSA FOR ECHO PROCEDURE: 2.31 M2
BUN SERPL-MCNC: 13 MG/DL (ref 8–23)
CALCIUM SERPL-MCNC: 8.7 MG/DL (ref 8.7–10.5)
CHLORIDE SERPL-SCNC: 100 MMOL/L (ref 95–110)
CO2 SERPL-SCNC: 31 MMOL/L (ref 23–29)
CREAT SERPL-MCNC: 0.9 MG/DL (ref 0.5–1.4)
CV ECHO LV RWT: 0.63 CM
DIFFERENTIAL METHOD: ABNORMAL
DOP CALC AO PEAK VEL: 1.23 M/S
DOP CALC AO VTI: 23.71 CM
DOP CALC LVOT AREA: 4.1 CM2
DOP CALC LVOT DIAMETER: 2.28 CM
DOP CALC LVOT PEAK VEL: 1.14 M/S
DOP CALC LVOT STROKE VOLUME: 100.22 CM3
DOP CALCLVOT PEAK VEL VTI: 24.56 CM
E WAVE DECELERATION TIME: 300.32 MSEC
E/A RATIO: 0.87
E/E' RATIO: 18.92 M/S
ECHO LV POSTERIOR WALL: 1.15 CM (ref 0.6–1.1)
EOSINOPHIL # BLD AUTO: 0.3 K/UL (ref 0–0.5)
EOSINOPHIL NFR BLD: 3 % (ref 0–8)
ERYTHROCYTE [DISTWIDTH] IN BLOOD BY AUTOMATED COUNT: 12.2 % (ref 11.5–14.5)
EST. GFR  (AFRICAN AMERICAN): >60 ML/MIN/1.73 M^2
EST. GFR  (NON AFRICAN AMERICAN): >60 ML/MIN/1.73 M^2
FOLATE SERPL-MCNC: 6.3 NG/ML (ref 4–24)
FRACTIONAL SHORTENING: 30 % (ref 28–44)
GLUCOSE SERPL-MCNC: 182 MG/DL (ref 70–110)
HCT VFR BLD AUTO: 42.8 % (ref 40–54)
HGB BLD-MCNC: 14.3 G/DL (ref 14–18)
IMM GRANULOCYTES # BLD AUTO: 0.03 K/UL (ref 0–0.04)
IMM GRANULOCYTES NFR BLD AUTO: 0.3 % (ref 0–0.5)
INTERVENTRICULAR SEPTUM: 1.16 CM (ref 0.6–1.1)
LA MAJOR: 4.97 CM
LA MINOR: 5.67 CM
LA WIDTH: 3.49 CM
LEFT ATRIUM SIZE: 4.35 CM
LEFT ATRIUM VOLUME INDEX: 30.4 ML/M2
LEFT ATRIUM VOLUME: 68.35 CM3
LEFT INTERNAL DIMENSION IN SYSTOLE: 2.57 CM (ref 2.1–4)
LEFT VENTRICLE DIASTOLIC VOLUME INDEX: 25.22 ML/M2
LEFT VENTRICLE DIASTOLIC VOLUME: 56.75 ML
LEFT VENTRICLE MASS INDEX: 61 G/M2
LEFT VENTRICLE SYSTOLIC VOLUME INDEX: 10.6 ML/M2
LEFT VENTRICLE SYSTOLIC VOLUME: 23.9 ML
LEFT VENTRICULAR INTERNAL DIMENSION IN DIASTOLE: 3.66 CM (ref 3.5–6)
LEFT VENTRICULAR MASS: 136.84 G
LV LATERAL E/E' RATIO: 20.5 M/S
LV SEPTAL E/E' RATIO: 17.57 M/S
LYMPHOCYTES # BLD AUTO: 2.8 K/UL (ref 1–4.8)
LYMPHOCYTES NFR BLD: 30.8 % (ref 18–48)
MCH RBC QN AUTO: 31.4 PG (ref 27–31)
MCHC RBC AUTO-ENTMCNC: 33.4 G/DL (ref 32–36)
MCV RBC AUTO: 94 FL (ref 82–98)
MONOCYTES # BLD AUTO: 0.7 K/UL (ref 0.3–1)
MONOCYTES NFR BLD: 8.1 % (ref 4–15)
MV PEAK A VEL: 1.41 M/S
MV PEAK E VEL: 1.23 M/S
NEUTROPHILS # BLD AUTO: 5.2 K/UL (ref 1.8–7.7)
NEUTROPHILS NFR BLD: 57 % (ref 38–73)
NRBC BLD-RTO: 0 /100 WBC
PLATELET # BLD AUTO: 196 K/UL (ref 150–350)
PMV BLD AUTO: 9.4 FL (ref 9.2–12.9)
POCT GLUCOSE: 180 MG/DL (ref 70–110)
POTASSIUM SERPL-SCNC: 4.2 MMOL/L (ref 3.5–5.1)
PROT SERPL-MCNC: 6.8 G/DL (ref 6–8.4)
PV PEAK VELOCITY: 1.06 CM/S
RA MAJOR: 4.43 CM
RA PRESSURE: 3 MMHG
RA WIDTH: 3.22 CM
RBC # BLD AUTO: 4.55 M/UL (ref 4.6–6.2)
RIGHT VENTRICULAR END-DIASTOLIC DIMENSION: 2.36 CM
SINUS: 2.73 CM
SODIUM SERPL-SCNC: 141 MMOL/L (ref 136–145)
TDI LATERAL: 0.06 M/S
TDI SEPTAL: 0.07 M/S
TDI: 0.07 M/S
TSH SERPL DL<=0.005 MIU/L-ACNC: 0.6 UIU/ML (ref 0.4–4)
VIT B12 SERPL-MCNC: 287 PG/ML (ref 210–950)
WBC # BLD AUTO: 9.1 K/UL (ref 3.9–12.7)

## 2021-02-01 PROCEDURE — 63600175 PHARM REV CODE 636 W HCPCS: Performed by: NURSE PRACTITIONER

## 2021-02-01 PROCEDURE — 82746 ASSAY OF FOLIC ACID SERUM: CPT

## 2021-02-01 PROCEDURE — 97161 PT EVAL LOW COMPLEX 20 MIN: CPT

## 2021-02-01 PROCEDURE — 97165 OT EVAL LOW COMPLEX 30 MIN: CPT

## 2021-02-01 PROCEDURE — 85025 COMPLETE CBC W/AUTO DIFF WBC: CPT

## 2021-02-01 PROCEDURE — 82607 VITAMIN B-12: CPT

## 2021-02-01 PROCEDURE — 80053 COMPREHEN METABOLIC PANEL: CPT

## 2021-02-01 PROCEDURE — G0378 HOSPITAL OBSERVATION PER HR: HCPCS

## 2021-02-01 PROCEDURE — 25000003 PHARM REV CODE 250: Performed by: NURSE PRACTITIONER

## 2021-02-01 PROCEDURE — 84425 ASSAY OF VITAMIN B-1: CPT

## 2021-02-01 PROCEDURE — 84443 ASSAY THYROID STIM HORMONE: CPT

## 2021-02-01 PROCEDURE — 94761 N-INVAS EAR/PLS OXIMETRY MLT: CPT

## 2021-02-01 PROCEDURE — 84207 ASSAY OF VITAMIN B-6: CPT

## 2021-02-01 PROCEDURE — 25000003 PHARM REV CODE 250: Performed by: INTERNAL MEDICINE

## 2021-02-01 PROCEDURE — 96372 THER/PROPH/DIAG INJ SC/IM: CPT

## 2021-02-01 PROCEDURE — 36415 COLL VENOUS BLD VENIPUNCTURE: CPT

## 2021-02-01 RX ORDER — ASPIRIN 81 MG/1
81 TABLET ORAL DAILY
Qty: 90 TABLET | Refills: 1
Start: 2021-02-01 | End: 2024-03-20

## 2021-02-01 RX ADMIN — Medication 400 MG: at 09:02

## 2021-02-01 RX ADMIN — INSULIN ASPART 25 UNITS: 100 INJECTION, SUSPENSION SUBCUTANEOUS at 09:02

## 2021-02-01 RX ADMIN — FAMOTIDINE 20 MG: 20 TABLET ORAL at 09:02

## 2021-02-01 RX ADMIN — ASPIRIN 325 MG: 325 TABLET, COATED ORAL at 09:02

## 2021-02-03 ENCOUNTER — TELEPHONE (OUTPATIENT)
Dept: MEDSURG UNIT | Facility: HOSPITAL | Age: 76
End: 2021-02-03

## 2021-02-04 ENCOUNTER — OFFICE VISIT (OUTPATIENT)
Dept: FAMILY MEDICINE | Facility: CLINIC | Age: 76
End: 2021-02-04
Payer: MEDICARE

## 2021-02-04 VITALS
OXYGEN SATURATION: 96 % | SYSTOLIC BLOOD PRESSURE: 138 MMHG | DIASTOLIC BLOOD PRESSURE: 80 MMHG | HEART RATE: 58 BPM | WEIGHT: 229 LBS | TEMPERATURE: 98 F | BODY MASS INDEX: 32.86 KG/M2

## 2021-02-04 DIAGNOSIS — H53.2 DIPLOPIA: ICD-10-CM

## 2021-02-04 DIAGNOSIS — E11.9 TYPE 2 DIABETES MELLITUS WITHOUT COMPLICATION, WITH LONG-TERM CURRENT USE OF INSULIN: ICD-10-CM

## 2021-02-04 DIAGNOSIS — E78.5 HYPERLIPIDEMIA, UNSPECIFIED HYPERLIPIDEMIA TYPE: Primary | ICD-10-CM

## 2021-02-04 DIAGNOSIS — E53.8 B12 DEFICIENCY: ICD-10-CM

## 2021-02-04 DIAGNOSIS — Z79.4 TYPE 2 DIABETES MELLITUS WITHOUT COMPLICATION, WITH LONG-TERM CURRENT USE OF INSULIN: ICD-10-CM

## 2021-02-04 DIAGNOSIS — I63.81 RIGHT THALAMIC STROKE: ICD-10-CM

## 2021-02-04 DIAGNOSIS — Z79.82 ASPIRIN LONG-TERM USE: ICD-10-CM

## 2021-02-04 DIAGNOSIS — I10 HYPERTENSION, ESSENTIAL: ICD-10-CM

## 2021-02-04 PROCEDURE — 1101F PR PT FALLS ASSESS DOC 0-1 FALLS W/OUT INJ PAST YR: ICD-10-PCS | Mod: CPTII,S$GLB,, | Performed by: FAMILY MEDICINE

## 2021-02-04 PROCEDURE — 1101F PT FALLS ASSESS-DOCD LE1/YR: CPT | Mod: CPTII,S$GLB,, | Performed by: FAMILY MEDICINE

## 2021-02-04 PROCEDURE — 3288F FALL RISK ASSESSMENT DOCD: CPT | Mod: CPTII,S$GLB,, | Performed by: FAMILY MEDICINE

## 2021-02-04 PROCEDURE — 3288F PR FALLS RISK ASSESSMENT DOCUMENTED: ICD-10-PCS | Mod: CPTII,S$GLB,, | Performed by: FAMILY MEDICINE

## 2021-02-04 PROCEDURE — 99214 PR OFFICE/OUTPT VISIT, EST, LEVL IV, 30-39 MIN: ICD-10-PCS | Mod: S$GLB,,, | Performed by: FAMILY MEDICINE

## 2021-02-04 PROCEDURE — 1126F AMNT PAIN NOTED NONE PRSNT: CPT | Mod: S$GLB,,, | Performed by: FAMILY MEDICINE

## 2021-02-04 PROCEDURE — 99214 OFFICE O/P EST MOD 30 MIN: CPT | Mod: S$GLB,,, | Performed by: FAMILY MEDICINE

## 2021-02-04 PROCEDURE — 1126F PR PAIN SEVERITY QUANTIFIED, NO PAIN PRESENT: ICD-10-PCS | Mod: S$GLB,,, | Performed by: FAMILY MEDICINE

## 2021-02-04 RX ORDER — SIMVASTATIN 20 MG/1
20 TABLET, FILM COATED ORAL NIGHTLY
Qty: 90 TABLET | Refills: 1 | Status: SHIPPED | OUTPATIENT
Start: 2021-02-04 | End: 2022-02-07 | Stop reason: SDUPTHER

## 2021-02-04 RX ORDER — METFORMIN HYDROCHLORIDE 500 MG/1
500 TABLET, EXTENDED RELEASE ORAL NIGHTLY
Qty: 90 TABLET | Refills: 1 | Status: CANCELLED | OUTPATIENT
Start: 2021-02-04

## 2021-02-04 RX ORDER — BENAZEPRIL HYDROCHLORIDE 20 MG/1
20 TABLET ORAL DAILY
Qty: 90 TABLET | Refills: 1 | Status: ON HOLD | OUTPATIENT
Start: 2021-02-04 | End: 2021-08-23 | Stop reason: HOSPADM

## 2021-02-04 RX ORDER — AMLODIPINE BESYLATE 5 MG/1
5 TABLET ORAL DAILY
Qty: 90 TABLET | Refills: 1 | Status: ON HOLD | OUTPATIENT
Start: 2021-02-04 | End: 2021-08-23 | Stop reason: HOSPADM

## 2021-02-05 LAB — PYRIDOXAL SERPL-MCNC: 9 UG/L (ref 5–50)

## 2021-02-07 PROBLEM — Z79.82 ASPIRIN LONG-TERM USE: Status: ACTIVE | Noted: 2021-02-07

## 2021-02-07 PROBLEM — I63.81 RIGHT THALAMIC STROKE: Status: ACTIVE | Noted: 2021-02-07

## 2021-02-07 PROBLEM — E53.8 B12 DEFICIENCY: Status: ACTIVE | Noted: 2021-02-07

## 2021-02-08 ENCOUNTER — PATIENT OUTREACH (OUTPATIENT)
Dept: ADMINISTRATIVE | Facility: HOSPITAL | Age: 76
End: 2021-02-08

## 2021-02-08 LAB — VIT B1 BLD-MCNC: 62 UG/L (ref 38–122)

## 2021-02-14 LAB — GAD65 AB SER-SCNC: 0 NMOL/L

## 2021-02-15 LAB
NACHR AB SER-SCNC: 0 NMOL/L
VGKC AB SER-SCNC: 0 NMOL/L

## 2021-02-16 LAB — CV2 IGG SER QL IB: NEGATIVE

## 2021-02-17 LAB
ACHR BIND AB SER-SCNC: 1.93 NMOL/L
ACHR MOD AB/ACHR TOTAL SFR SER: 100 %
MG INTERPRETIVE COMMENTS: ABNORMAL
STRIA MUS AB TITR SER: ABNORMAL TITER

## 2021-02-18 ENCOUNTER — TELEPHONE (OUTPATIENT)
Dept: FAMILY MEDICINE | Facility: CLINIC | Age: 76
End: 2021-02-18

## 2021-06-22 ENCOUNTER — HOSPITAL ENCOUNTER (OUTPATIENT)
Dept: RADIOLOGY | Facility: HOSPITAL | Age: 76
Discharge: HOME | End: 2021-06-22
Attending: FAMILY MEDICINE
Payer: MEDICARE

## 2021-06-22 ENCOUNTER — OFFICE VISIT (OUTPATIENT)
Dept: FAMILY MEDICINE | Facility: CLINIC | Age: 76
End: 2021-06-22
Payer: MEDICARE

## 2021-06-22 VITALS
HEART RATE: 81 BPM | TEMPERATURE: 99 F | BODY MASS INDEX: 32.93 KG/M2 | HEIGHT: 70 IN | WEIGHT: 230 LBS | OXYGEN SATURATION: 96 %

## 2021-06-22 DIAGNOSIS — I63.81 RIGHT THALAMIC STROKE: ICD-10-CM

## 2021-06-22 DIAGNOSIS — J98.01 BRONCHOSPASM: ICD-10-CM

## 2021-06-22 DIAGNOSIS — Z79.4 TYPE 2 DIABETES MELLITUS WITH DIABETIC NEUROPATHY, WITH LONG-TERM CURRENT USE OF INSULIN: ICD-10-CM

## 2021-06-22 DIAGNOSIS — E11.40 TYPE 2 DIABETES MELLITUS WITH DIABETIC NEUROPATHY, WITH LONG-TERM CURRENT USE OF INSULIN: ICD-10-CM

## 2021-06-22 DIAGNOSIS — E78.5 HYPERLIPIDEMIA, UNSPECIFIED HYPERLIPIDEMIA TYPE: ICD-10-CM

## 2021-06-22 DIAGNOSIS — R05.9 COUGH: ICD-10-CM

## 2021-06-22 DIAGNOSIS — J40 BRONCHITIS: ICD-10-CM

## 2021-06-22 DIAGNOSIS — G70.00 MYASTHENIA GRAVIS: ICD-10-CM

## 2021-06-22 DIAGNOSIS — Z79.82 ASPIRIN LONG-TERM USE: Primary | ICD-10-CM

## 2021-06-22 DIAGNOSIS — I10 HYPERTENSION, ESSENTIAL: ICD-10-CM

## 2021-06-22 DIAGNOSIS — E53.8 B12 DEFICIENCY: ICD-10-CM

## 2021-06-22 PROCEDURE — 1159F MED LIST DOCD IN RCRD: CPT | Mod: S$GLB,,, | Performed by: FAMILY MEDICINE

## 2021-06-22 PROCEDURE — 99214 PR OFFICE/OUTPT VISIT, EST, LEVL IV, 30-39 MIN: ICD-10-PCS | Mod: S$GLB,,, | Performed by: FAMILY MEDICINE

## 2021-06-22 PROCEDURE — 1126F AMNT PAIN NOTED NONE PRSNT: CPT | Mod: S$GLB,,, | Performed by: FAMILY MEDICINE

## 2021-06-22 PROCEDURE — 71046 X-RAY EXAM CHEST 2 VIEWS: CPT | Mod: TC

## 2021-06-22 PROCEDURE — 3051F HG A1C>EQUAL 7.0%<8.0%: CPT | Mod: CPTII,S$GLB,, | Performed by: FAMILY MEDICINE

## 2021-06-22 PROCEDURE — 1126F PR PAIN SEVERITY QUANTIFIED, NO PAIN PRESENT: ICD-10-PCS | Mod: S$GLB,,, | Performed by: FAMILY MEDICINE

## 2021-06-22 PROCEDURE — 3288F FALL RISK ASSESSMENT DOCD: CPT | Mod: CPTII,S$GLB,, | Performed by: FAMILY MEDICINE

## 2021-06-22 PROCEDURE — 99499 UNLISTED E&M SERVICE: CPT | Mod: S$GLB,,, | Performed by: FAMILY MEDICINE

## 2021-06-22 PROCEDURE — 3051F PR MOST RECENT HEMOGLOBIN A1C LEVEL 7.0 - < 8.0%: ICD-10-PCS | Mod: CPTII,S$GLB,, | Performed by: FAMILY MEDICINE

## 2021-06-22 PROCEDURE — 1159F PR MEDICATION LIST DOCUMENTED IN MEDICAL RECORD: ICD-10-PCS | Mod: S$GLB,,, | Performed by: FAMILY MEDICINE

## 2021-06-22 PROCEDURE — 3288F PR FALLS RISK ASSESSMENT DOCUMENTED: ICD-10-PCS | Mod: CPTII,S$GLB,, | Performed by: FAMILY MEDICINE

## 2021-06-22 PROCEDURE — 99499 RISK ADDL DX/OHS AUDIT: ICD-10-PCS | Mod: S$GLB,,, | Performed by: FAMILY MEDICINE

## 2021-06-22 PROCEDURE — 1101F PR PT FALLS ASSESS DOC 0-1 FALLS W/OUT INJ PAST YR: ICD-10-PCS | Mod: CPTII,S$GLB,, | Performed by: FAMILY MEDICINE

## 2021-06-22 PROCEDURE — 99214 OFFICE O/P EST MOD 30 MIN: CPT | Mod: S$GLB,,, | Performed by: FAMILY MEDICINE

## 2021-06-22 PROCEDURE — 1101F PT FALLS ASSESS-DOCD LE1/YR: CPT | Mod: CPTII,S$GLB,, | Performed by: FAMILY MEDICINE

## 2021-06-22 RX ORDER — ALBUTEROL SULFATE 90 UG/1
2 AEROSOL, METERED RESPIRATORY (INHALATION) EVERY 4 HOURS PRN
Qty: 18 G | Refills: 0 | Status: SHIPPED | OUTPATIENT
Start: 2021-06-22 | End: 2021-08-12 | Stop reason: CLARIF

## 2021-06-22 RX ORDER — DOXYCYCLINE 100 MG/1
100 CAPSULE ORAL EVERY 12 HOURS
Qty: 20 CAPSULE | Refills: 0 | Status: SHIPPED | OUTPATIENT
Start: 2021-06-22 | End: 2021-08-12 | Stop reason: CLARIF

## 2021-06-22 RX ORDER — MYCOPHENOLATE MOFETIL 500 MG/1
1250 TABLET ORAL 2 TIMES DAILY
COMMUNITY

## 2021-06-23 ENCOUNTER — APPOINTMENT (OUTPATIENT)
Dept: URGENT CARE | Facility: CLINIC | Age: 76
End: 2021-06-23
Payer: MEDICARE

## 2021-06-23 DIAGNOSIS — R05.9 COUGH: ICD-10-CM

## 2021-06-23 PROCEDURE — U0005 INFEC AGEN DETEC AMPLI PROBE: HCPCS | Performed by: FAMILY MEDICINE

## 2021-06-23 PROCEDURE — U0003 INFECTIOUS AGENT DETECTION BY NUCLEIC ACID (DNA OR RNA); SEVERE ACUTE RESPIRATORY SYNDROME CORONAVIRUS 2 (SARS-COV-2) (CORONAVIRUS DISEASE [COVID-19]), AMPLIFIED PROBE TECHNIQUE, MAKING USE OF HIGH THROUGHPUT TECHNOLOGIES AS DESCRIBED BY CMS-2020-01-R: HCPCS | Performed by: FAMILY MEDICINE

## 2021-06-24 LAB — SARS-COV-2 RNA RESP QL NAA+PROBE: NOT DETECTED

## 2021-06-25 ENCOUNTER — TELEPHONE (OUTPATIENT)
Dept: FAMILY MEDICINE | Facility: CLINIC | Age: 76
End: 2021-06-25

## 2021-08-10 ENCOUNTER — TELEPHONE (OUTPATIENT)
Dept: FAMILY MEDICINE | Facility: CLINIC | Age: 76
End: 2021-08-10

## 2021-08-10 ENCOUNTER — OFFICE VISIT (OUTPATIENT)
Dept: URGENT CARE | Facility: CLINIC | Age: 76
End: 2021-08-10
Payer: MEDICARE

## 2021-08-10 VITALS
BODY MASS INDEX: 32.21 KG/M2 | RESPIRATION RATE: 16 BRPM | SYSTOLIC BLOOD PRESSURE: 111 MMHG | TEMPERATURE: 100 F | OXYGEN SATURATION: 96 % | HEIGHT: 70 IN | WEIGHT: 225 LBS | HEART RATE: 113 BPM | DIASTOLIC BLOOD PRESSURE: 72 MMHG

## 2021-08-10 DIAGNOSIS — U07.1 COVID-19: Primary | ICD-10-CM

## 2021-08-10 PROCEDURE — 3074F PR MOST RECENT SYSTOLIC BLOOD PRESSURE < 130 MM HG: ICD-10-PCS | Mod: CPTII,S$GLB,, | Performed by: NURSE PRACTITIONER

## 2021-08-10 PROCEDURE — 3074F SYST BP LT 130 MM HG: CPT | Mod: CPTII,S$GLB,, | Performed by: NURSE PRACTITIONER

## 2021-08-10 PROCEDURE — 1159F PR MEDICATION LIST DOCUMENTED IN MEDICAL RECORD: ICD-10-PCS | Mod: CPTII,S$GLB,, | Performed by: NURSE PRACTITIONER

## 2021-08-10 PROCEDURE — 99203 OFFICE O/P NEW LOW 30 MIN: CPT | Mod: S$GLB,,, | Performed by: NURSE PRACTITIONER

## 2021-08-10 PROCEDURE — 3078F PR MOST RECENT DIASTOLIC BLOOD PRESSURE < 80 MM HG: ICD-10-PCS | Mod: CPTII,S$GLB,, | Performed by: NURSE PRACTITIONER

## 2021-08-10 PROCEDURE — 3078F DIAST BP <80 MM HG: CPT | Mod: CPTII,S$GLB,, | Performed by: NURSE PRACTITIONER

## 2021-08-10 PROCEDURE — 1159F MED LIST DOCD IN RCRD: CPT | Mod: CPTII,S$GLB,, | Performed by: NURSE PRACTITIONER

## 2021-08-10 PROCEDURE — 99203 PR OFFICE/OUTPT VISIT, NEW, LEVL III, 30-44 MIN: ICD-10-PCS | Mod: S$GLB,,, | Performed by: NURSE PRACTITIONER

## 2021-08-10 RX ORDER — ALBUTEROL SULFATE 90 UG/1
2 AEROSOL, METERED RESPIRATORY (INHALATION) EVERY 6 HOURS PRN
Qty: 18 G | Refills: 0 | Status: SHIPPED | OUTPATIENT
Start: 2021-08-10 | End: 2022-02-17 | Stop reason: ALTCHOICE

## 2021-08-12 ENCOUNTER — HOSPITAL ENCOUNTER (EMERGENCY)
Facility: HOSPITAL | Age: 76
Discharge: HOME | End: 2021-08-12
Attending: EMERGENCY MEDICINE
Payer: MEDICARE

## 2021-08-12 VITALS
SYSTOLIC BLOOD PRESSURE: 132 MMHG | HEART RATE: 89 BPM | TEMPERATURE: 99 F | RESPIRATION RATE: 24 BRPM | OXYGEN SATURATION: 96 % | HEIGHT: 70 IN | BODY MASS INDEX: 32.35 KG/M2 | WEIGHT: 226 LBS | DIASTOLIC BLOOD PRESSURE: 78 MMHG

## 2021-08-12 DIAGNOSIS — U07.1 COVID-19 VIRUS INFECTION: Primary | ICD-10-CM

## 2021-08-12 PROCEDURE — 63600175 PHARM REV CODE 636 W HCPCS: Performed by: EMERGENCY MEDICINE

## 2021-08-12 PROCEDURE — 25000003 PHARM REV CODE 250: Performed by: EMERGENCY MEDICINE

## 2021-08-12 PROCEDURE — 99284 EMERGENCY DEPT VISIT MOD MDM: CPT | Mod: 25

## 2021-08-12 PROCEDURE — 96374 THER/PROPH/DIAG INJ IV PUSH: CPT

## 2021-08-12 RX ORDER — IBUPROFEN 400 MG/1
800 TABLET ORAL
Status: COMPLETED | OUTPATIENT
Start: 2021-08-12 | End: 2021-08-12

## 2021-08-12 RX ORDER — ONDANSETRON 4 MG/1
4 TABLET, FILM COATED ORAL EVERY 6 HOURS PRN
Qty: 12 TABLET | Refills: 0 | Status: SHIPPED | OUTPATIENT
Start: 2021-08-12 | End: 2022-02-17 | Stop reason: ALTCHOICE

## 2021-08-12 RX ORDER — SODIUM CHLORIDE 9 MG/ML
INJECTION, SOLUTION INTRAVENOUS ONCE
Status: COMPLETED | OUTPATIENT
Start: 2021-08-12 | End: 2021-08-12

## 2021-08-12 RX ORDER — IVERMECTIN 3 MG/1
19.5 TABLET ORAL DAILY
COMMUNITY
End: 2021-08-25

## 2021-08-12 RX ORDER — ONDANSETRON 2 MG/ML
4 INJECTION INTRAMUSCULAR; INTRAVENOUS
Status: COMPLETED | OUTPATIENT
Start: 2021-08-12 | End: 2021-08-12

## 2021-08-12 RX ORDER — PROMETHAZINE HYDROCHLORIDE AND DEXTROMETHORPHAN HYDROBROMIDE 6.25; 15 MG/5ML; MG/5ML
5 SYRUP ORAL NIGHTLY PRN
Qty: 40 ML | Refills: 0 | Status: SHIPPED | OUTPATIENT
Start: 2021-08-12 | End: 2021-08-17

## 2021-08-12 RX ADMIN — SODIUM CHLORIDE: 0.9 INJECTION, SOLUTION INTRAVENOUS at 06:08

## 2021-08-12 RX ADMIN — IBUPROFEN 800 MG: 400 TABLET ORAL at 06:08

## 2021-08-12 RX ADMIN — ONDANSETRON 4 MG: 2 INJECTION INTRAMUSCULAR; INTRAVENOUS at 06:08

## 2021-08-13 ENCOUNTER — INFUSION (OUTPATIENT)
Dept: INFECTIOUS DISEASES | Facility: HOSPITAL | Age: 76
End: 2021-08-13
Attending: NURSE PRACTITIONER
Payer: MEDICARE

## 2021-08-13 VITALS
RESPIRATION RATE: 22 BRPM | DIASTOLIC BLOOD PRESSURE: 57 MMHG | HEART RATE: 106 BPM | SYSTOLIC BLOOD PRESSURE: 118 MMHG | OXYGEN SATURATION: 94 % | TEMPERATURE: 97 F

## 2021-08-13 DIAGNOSIS — U07.1 COVID-19: Primary | ICD-10-CM

## 2021-08-13 PROCEDURE — M0243 CASIRIVI AND IMDEVI INFUSION: HCPCS | Performed by: NURSE PRACTITIONER

## 2021-08-13 PROCEDURE — 63600175 PHARM REV CODE 636 W HCPCS: Performed by: NURSE PRACTITIONER

## 2021-08-13 PROCEDURE — 25000003 PHARM REV CODE 250: Performed by: NURSE PRACTITIONER

## 2021-08-13 RX ORDER — EPINEPHRINE 0.3 MG/.3ML
0.3 INJECTION SUBCUTANEOUS
Status: DISCONTINUED | OUTPATIENT
Start: 2021-08-13 | End: 2022-02-17 | Stop reason: ALTCHOICE

## 2021-08-13 RX ORDER — ACETAMINOPHEN 325 MG/1
650 TABLET ORAL ONCE AS NEEDED
Status: DISCONTINUED | OUTPATIENT
Start: 2021-08-13 | End: 2022-02-17

## 2021-08-13 RX ORDER — DIPHENHYDRAMINE HYDROCHLORIDE 50 MG/ML
25 INJECTION INTRAMUSCULAR; INTRAVENOUS ONCE AS NEEDED
Status: DISCONTINUED | OUTPATIENT
Start: 2021-08-13 | End: 2022-02-17 | Stop reason: ALTCHOICE

## 2021-08-13 RX ORDER — ONDANSETRON 4 MG/1
4 TABLET, ORALLY DISINTEGRATING ORAL ONCE AS NEEDED
Status: DISCONTINUED | OUTPATIENT
Start: 2021-08-13 | End: 2022-02-17 | Stop reason: ALTCHOICE

## 2021-08-13 RX ORDER — SODIUM CHLORIDE 0.9 % (FLUSH) 0.9 %
10 SYRINGE (ML) INJECTION
Status: DISCONTINUED | OUTPATIENT
Start: 2021-08-13 | End: 2022-02-17 | Stop reason: ALTCHOICE

## 2021-08-13 RX ORDER — ALBUTEROL SULFATE 90 UG/1
2 AEROSOL, METERED RESPIRATORY (INHALATION)
Status: DISCONTINUED | OUTPATIENT
Start: 2021-08-13 | End: 2022-02-17 | Stop reason: ALTCHOICE

## 2021-08-13 RX ADMIN — CASIRIVIMAB AND IMDEVIMAB 600 MG: 600; 600 INJECTION, SOLUTION, CONCENTRATE INTRAVENOUS at 11:08

## 2021-08-13 RX ADMIN — SODIUM CHLORIDE: 0.9 INJECTION, SOLUTION INTRAVENOUS at 11:08

## 2021-08-19 ENCOUNTER — HOSPITAL ENCOUNTER (INPATIENT)
Facility: HOSPITAL | Age: 76
LOS: 4 days | Discharge: HOME | DRG: 177 | End: 2021-08-23
Attending: EMERGENCY MEDICINE | Admitting: INTERNAL MEDICINE
Payer: MEDICARE

## 2021-08-19 DIAGNOSIS — U07.1 COVID-19 VIRUS INFECTION: Primary | ICD-10-CM

## 2021-08-19 DIAGNOSIS — J12.9 VIRAL PNEUMONIA: ICD-10-CM

## 2021-08-19 DIAGNOSIS — R09.02 HYPOXIA: ICD-10-CM

## 2021-08-19 PROBLEM — J42 CHRONIC BRONCHITIS: Status: ACTIVE | Noted: 2021-08-19

## 2021-08-19 PROBLEM — G70.00 OCULAR MYASTHENIA: Status: ACTIVE | Noted: 2021-08-19

## 2021-08-19 PROBLEM — J96.01 ACUTE HYPOXEMIC RESPIRATORY FAILURE: Status: ACTIVE | Noted: 2021-08-19

## 2021-08-19 LAB
25(OH)D3+25(OH)D2 SERPL-MCNC: 32 NG/ML (ref 30–96)
ALBUMIN SERPL BCP-MCNC: 3.1 G/DL (ref 3.5–5.2)
ALP SERPL-CCNC: 51 U/L (ref 55–135)
ALT SERPL W/O P-5'-P-CCNC: 19 U/L (ref 10–44)
ANION GAP SERPL CALC-SCNC: 14 MMOL/L (ref 8–16)
APTT BLDCRRT: 25.9 SEC (ref 21–32)
AST SERPL-CCNC: 25 U/L (ref 10–40)
BASOPHILS # BLD AUTO: 0.02 K/UL (ref 0–0.2)
BASOPHILS NFR BLD: 0.2 % (ref 0–1.9)
BILIRUB SERPL-MCNC: 0.6 MG/DL (ref 0.1–1)
BNP SERPL-MCNC: 76 PG/ML (ref 0–99)
BUN SERPL-MCNC: 11 MG/DL (ref 8–23)
CALCIUM SERPL-MCNC: 8.9 MG/DL (ref 8.7–10.5)
CHLORIDE SERPL-SCNC: 101 MMOL/L (ref 95–110)
CK SERPL-CCNC: 95 U/L (ref 20–200)
CO2 SERPL-SCNC: 25 MMOL/L (ref 23–29)
CREAT SERPL-MCNC: 0.8 MG/DL (ref 0.5–1.4)
CRP SERPL-MCNC: 58 MG/L (ref 0–8.2)
D DIMER PPP IA.FEU-MCNC: 10.75 MG/L FEU
DIFFERENTIAL METHOD: ABNORMAL
EOSINOPHIL # BLD AUTO: 0.1 K/UL (ref 0–0.5)
EOSINOPHIL NFR BLD: 1.1 % (ref 0–8)
ERYTHROCYTE [DISTWIDTH] IN BLOOD BY AUTOMATED COUNT: 12.2 % (ref 11.5–14.5)
ERYTHROCYTE [SEDIMENTATION RATE] IN BLOOD BY WESTERGREN METHOD: 53 MM/HR (ref 0–10)
EST. GFR  (AFRICAN AMERICAN): >60 ML/MIN/1.73 M^2
EST. GFR  (NON AFRICAN AMERICAN): >60 ML/MIN/1.73 M^2
FERRITIN SERPL-MCNC: 378 NG/ML (ref 20–300)
GLUCOSE SERPL-MCNC: 150 MG/DL (ref 70–110)
HCT VFR BLD AUTO: 41.3 % (ref 40–54)
HGB BLD-MCNC: 14.3 G/DL (ref 14–18)
IMM GRANULOCYTES # BLD AUTO: 0.05 K/UL (ref 0–0.04)
IMM GRANULOCYTES NFR BLD AUTO: 0.6 % (ref 0–0.5)
INR PPP: 1 (ref 0.8–1.2)
LACTATE SERPL-SCNC: 1.1 MMOL/L (ref 0.5–2.2)
LDH SERPL L TO P-CCNC: 299 U/L (ref 110–260)
LYMPHOCYTES # BLD AUTO: 2.2 K/UL (ref 1–4.8)
LYMPHOCYTES NFR BLD: 24.9 % (ref 18–48)
MCH RBC QN AUTO: 31.4 PG (ref 27–31)
MCHC RBC AUTO-ENTMCNC: 34.6 G/DL (ref 32–36)
MCV RBC AUTO: 91 FL (ref 82–98)
MONOCYTES # BLD AUTO: 1.2 K/UL (ref 0.3–1)
MONOCYTES NFR BLD: 13.3 % (ref 4–15)
NEUTROPHILS # BLD AUTO: 5.2 K/UL (ref 1.8–7.7)
NEUTROPHILS NFR BLD: 59.9 % (ref 38–73)
NRBC BLD-RTO: 0 /100 WBC
PLATELET # BLD AUTO: 236 K/UL (ref 150–450)
PMV BLD AUTO: 9.6 FL (ref 9.2–12.9)
POCT GLUCOSE: 156 MG/DL (ref 70–110)
POCT GLUCOSE: 254 MG/DL (ref 70–110)
POCT GLUCOSE: 305 MG/DL (ref 70–110)
POCT GLUCOSE: 342 MG/DL (ref 70–110)
POTASSIUM SERPL-SCNC: 4 MMOL/L (ref 3.5–5.1)
PROCALCITONIN SERPL IA-MCNC: 0.07 NG/ML
PROT SERPL-MCNC: 6.9 G/DL (ref 6–8.4)
PROTHROMBIN TIME: 10.8 SEC (ref 9–12.5)
RBC # BLD AUTO: 4.55 M/UL (ref 4.6–6.2)
SODIUM SERPL-SCNC: 140 MMOL/L (ref 136–145)
TROPONIN I SERPL DL<=0.01 NG/ML-MCNC: 0.01 NG/ML (ref 0–0.03)
WBC # BLD AUTO: 8.7 K/UL (ref 3.9–12.7)

## 2021-08-19 PROCEDURE — 85651 RBC SED RATE NONAUTOMATED: CPT | Performed by: NURSE PRACTITIONER

## 2021-08-19 PROCEDURE — 85610 PROTHROMBIN TIME: CPT | Performed by: NURSE PRACTITIONER

## 2021-08-19 PROCEDURE — 83615 LACTATE (LD) (LDH) ENZYME: CPT | Performed by: EMERGENCY MEDICINE

## 2021-08-19 PROCEDURE — 99223 1ST HOSP IP/OBS HIGH 75: CPT | Mod: ,,, | Performed by: INTERNAL MEDICINE

## 2021-08-19 PROCEDURE — 36415 COLL VENOUS BLD VENIPUNCTURE: CPT | Performed by: NURSE PRACTITIONER

## 2021-08-19 PROCEDURE — 25000242 PHARM REV CODE 250 ALT 637 W/ HCPCS: Performed by: INTERNAL MEDICINE

## 2021-08-19 PROCEDURE — 27000221 HC OXYGEN, UP TO 24 HOURS

## 2021-08-19 PROCEDURE — 93005 ELECTROCARDIOGRAM TRACING: CPT

## 2021-08-19 PROCEDURE — 83605 ASSAY OF LACTIC ACID: CPT | Performed by: EMERGENCY MEDICINE

## 2021-08-19 PROCEDURE — 25000242 PHARM REV CODE 250 ALT 637 W/ HCPCS: Performed by: NURSE PRACTITIONER

## 2021-08-19 PROCEDURE — 84145 PROCALCITONIN (PCT): CPT | Performed by: NURSE PRACTITIONER

## 2021-08-19 PROCEDURE — 36415 COLL VENOUS BLD VENIPUNCTURE: CPT | Performed by: EMERGENCY MEDICINE

## 2021-08-19 PROCEDURE — 25000003 PHARM REV CODE 250: Performed by: NURSE PRACTITIONER

## 2021-08-19 PROCEDURE — 12000002 HC ACUTE/MED SURGE SEMI-PRIVATE ROOM

## 2021-08-19 PROCEDURE — 82550 ASSAY OF CK (CPK): CPT | Performed by: EMERGENCY MEDICINE

## 2021-08-19 PROCEDURE — 63600175 PHARM REV CODE 636 W HCPCS: Performed by: HOSPITALIST

## 2021-08-19 PROCEDURE — 86140 C-REACTIVE PROTEIN: CPT | Performed by: EMERGENCY MEDICINE

## 2021-08-19 PROCEDURE — 85730 THROMBOPLASTIN TIME PARTIAL: CPT | Performed by: NURSE PRACTITIONER

## 2021-08-19 PROCEDURE — 63600175 PHARM REV CODE 636 W HCPCS: Performed by: NURSE PRACTITIONER

## 2021-08-19 PROCEDURE — 85025 COMPLETE CBC W/AUTO DIFF WBC: CPT | Performed by: EMERGENCY MEDICINE

## 2021-08-19 PROCEDURE — 94640 AIRWAY INHALATION TREATMENT: CPT

## 2021-08-19 PROCEDURE — 99285 EMERGENCY DEPT VISIT HI MDM: CPT | Mod: 25

## 2021-08-19 PROCEDURE — 83880 ASSAY OF NATRIURETIC PEPTIDE: CPT | Performed by: NURSE PRACTITIONER

## 2021-08-19 PROCEDURE — 80053 COMPREHEN METABOLIC PANEL: CPT | Performed by: EMERGENCY MEDICINE

## 2021-08-19 PROCEDURE — 99223 PR INITIAL HOSPITAL CARE,LEVL III: ICD-10-PCS | Mod: ,,, | Performed by: INTERNAL MEDICINE

## 2021-08-19 PROCEDURE — 82306 VITAMIN D 25 HYDROXY: CPT | Performed by: NURSE PRACTITIONER

## 2021-08-19 PROCEDURE — 25000003 PHARM REV CODE 250: Performed by: HOSPITALIST

## 2021-08-19 PROCEDURE — 27100098 HC SPACER

## 2021-08-19 PROCEDURE — 85379 FIBRIN DEGRADATION QUANT: CPT | Performed by: NURSE PRACTITIONER

## 2021-08-19 PROCEDURE — 94761 N-INVAS EAR/PLS OXIMETRY MLT: CPT

## 2021-08-19 PROCEDURE — 25000003 PHARM REV CODE 250: Performed by: INTERNAL MEDICINE

## 2021-08-19 PROCEDURE — 27000207 HC ISOLATION

## 2021-08-19 PROCEDURE — 63600175 PHARM REV CODE 636 W HCPCS: Performed by: INTERNAL MEDICINE

## 2021-08-19 PROCEDURE — 84484 ASSAY OF TROPONIN QUANT: CPT | Performed by: EMERGENCY MEDICINE

## 2021-08-19 PROCEDURE — 82728 ASSAY OF FERRITIN: CPT | Performed by: EMERGENCY MEDICINE

## 2021-08-19 RX ORDER — SODIUM CHLORIDE 0.9 % (FLUSH) 0.9 %
10 SYRINGE (ML) INJECTION
Status: DISCONTINUED | OUTPATIENT
Start: 2021-08-19 | End: 2021-08-23 | Stop reason: HOSPADM

## 2021-08-19 RX ORDER — ALPRAZOLAM 0.25 MG/1
0.25 TABLET ORAL 3 TIMES DAILY PRN
Status: DISCONTINUED | OUTPATIENT
Start: 2021-08-19 | End: 2021-08-23 | Stop reason: HOSPADM

## 2021-08-19 RX ORDER — INSULIN ASPART 100 [IU]/ML
1-10 INJECTION, SOLUTION INTRAVENOUS; SUBCUTANEOUS
Status: DISCONTINUED | OUTPATIENT
Start: 2021-08-19 | End: 2021-08-23 | Stop reason: HOSPADM

## 2021-08-19 RX ORDER — ENOXAPARIN SODIUM 100 MG/ML
1 INJECTION SUBCUTANEOUS 2 TIMES DAILY
Status: DISCONTINUED | OUTPATIENT
Start: 2021-08-19 | End: 2021-08-21

## 2021-08-19 RX ORDER — LANOLIN ALCOHOL/MO/W.PET/CERES
800 CREAM (GRAM) TOPICAL
Status: DISCONTINUED | OUTPATIENT
Start: 2021-08-19 | End: 2021-08-23 | Stop reason: HOSPADM

## 2021-08-19 RX ORDER — TALC
6 POWDER (GRAM) TOPICAL NIGHTLY PRN
Status: DISCONTINUED | OUTPATIENT
Start: 2021-08-19 | End: 2021-08-23 | Stop reason: HOSPADM

## 2021-08-19 RX ORDER — ASPIRIN 81 MG/1
81 TABLET ORAL DAILY
Status: DISCONTINUED | OUTPATIENT
Start: 2021-08-20 | End: 2021-08-23 | Stop reason: HOSPADM

## 2021-08-19 RX ORDER — ASCORBIC ACID 500 MG
500 TABLET ORAL 2 TIMES DAILY
Status: DISCONTINUED | OUTPATIENT
Start: 2021-08-19 | End: 2021-08-23 | Stop reason: HOSPADM

## 2021-08-19 RX ORDER — FLUTICASONE PROPIONATE 50 MCG
2 SPRAY, SUSPENSION (ML) NASAL DAILY
Status: DISCONTINUED | OUTPATIENT
Start: 2021-08-19 | End: 2021-08-23 | Stop reason: HOSPADM

## 2021-08-19 RX ORDER — SODIUM,POTASSIUM PHOSPHATES 280-250MG
2 POWDER IN PACKET (EA) ORAL
Status: DISCONTINUED | OUTPATIENT
Start: 2021-08-19 | End: 2021-08-23 | Stop reason: HOSPADM

## 2021-08-19 RX ORDER — ACETAMINOPHEN 325 MG/1
650 TABLET ORAL EVERY 4 HOURS PRN
Status: DISCONTINUED | OUTPATIENT
Start: 2021-08-19 | End: 2021-08-23 | Stop reason: HOSPADM

## 2021-08-19 RX ORDER — IBUPROFEN 200 MG
24 TABLET ORAL
Status: DISCONTINUED | OUTPATIENT
Start: 2021-08-19 | End: 2021-08-23 | Stop reason: HOSPADM

## 2021-08-19 RX ORDER — LISINOPRIL 10 MG/1
20 TABLET ORAL DAILY
Status: DISCONTINUED | OUTPATIENT
Start: 2021-08-20 | End: 2021-08-19

## 2021-08-19 RX ORDER — BENZONATATE 100 MG/1
200 CAPSULE ORAL 3 TIMES DAILY PRN
Status: DISCONTINUED | OUTPATIENT
Start: 2021-08-19 | End: 2021-08-23 | Stop reason: HOSPADM

## 2021-08-19 RX ORDER — LISINOPRIL 10 MG/1
20 TABLET ORAL DAILY
Status: DISCONTINUED | OUTPATIENT
Start: 2021-08-19 | End: 2021-08-21

## 2021-08-19 RX ORDER — HYDROCODONE BITARTRATE AND ACETAMINOPHEN 5; 325 MG/1; MG/1
1 TABLET ORAL EVERY 6 HOURS
Status: DISCONTINUED | OUTPATIENT
Start: 2021-08-19 | End: 2021-08-23 | Stop reason: HOSPADM

## 2021-08-19 RX ORDER — ALBUTEROL SULFATE 90 UG/1
2 AEROSOL, METERED RESPIRATORY (INHALATION) EVERY 6 HOURS
Status: DISCONTINUED | OUTPATIENT
Start: 2021-08-19 | End: 2021-08-23 | Stop reason: HOSPADM

## 2021-08-19 RX ORDER — AMLODIPINE BESYLATE 5 MG/1
5 TABLET ORAL DAILY
Status: DISCONTINUED | OUTPATIENT
Start: 2021-08-20 | End: 2021-08-19

## 2021-08-19 RX ORDER — GLUCAGON 1 MG
1 KIT INJECTION
Status: DISCONTINUED | OUTPATIENT
Start: 2021-08-19 | End: 2021-08-23 | Stop reason: HOSPADM

## 2021-08-19 RX ORDER — ACETAMINOPHEN 325 MG/1
650 TABLET ORAL EVERY 8 HOURS PRN
Status: DISCONTINUED | OUTPATIENT
Start: 2021-08-19 | End: 2021-08-23 | Stop reason: HOSPADM

## 2021-08-19 RX ORDER — MYCOPHENOLATE MOFETIL 250 MG/1
1250 CAPSULE ORAL 2 TIMES DAILY
Status: DISCONTINUED | OUTPATIENT
Start: 2021-08-19 | End: 2021-08-23 | Stop reason: HOSPADM

## 2021-08-19 RX ORDER — OXYMETAZOLINE HCL 0.05 %
1 SPRAY, NON-AEROSOL (ML) NASAL 2 TIMES DAILY
Status: DISPENSED | OUTPATIENT
Start: 2021-08-19 | End: 2021-08-22

## 2021-08-19 RX ORDER — ATORVASTATIN CALCIUM 10 MG/1
10 TABLET, FILM COATED ORAL NIGHTLY
Status: DISCONTINUED | OUTPATIENT
Start: 2021-08-19 | End: 2021-08-23 | Stop reason: HOSPADM

## 2021-08-19 RX ORDER — ONDANSETRON 2 MG/ML
4 INJECTION INTRAMUSCULAR; INTRAVENOUS EVERY 8 HOURS PRN
Status: DISCONTINUED | OUTPATIENT
Start: 2021-08-19 | End: 2021-08-23 | Stop reason: HOSPADM

## 2021-08-19 RX ORDER — INSULIN ASPART 100 [IU]/ML
25 INJECTION, SUSPENSION SUBCUTANEOUS 2 TIMES DAILY WITH MEALS
Status: DISCONTINUED | OUTPATIENT
Start: 2021-08-19 | End: 2021-08-20

## 2021-08-19 RX ORDER — IBUPROFEN 200 MG
16 TABLET ORAL
Status: DISCONTINUED | OUTPATIENT
Start: 2021-08-19 | End: 2021-08-23 | Stop reason: HOSPADM

## 2021-08-19 RX ORDER — AMLODIPINE BESYLATE 5 MG/1
5 TABLET ORAL DAILY
Status: DISCONTINUED | OUTPATIENT
Start: 2021-08-19 | End: 2021-08-21

## 2021-08-19 RX ADMIN — INSULIN ASPART 6 UNITS: 100 INJECTION, SOLUTION INTRAVENOUS; SUBCUTANEOUS at 11:08

## 2021-08-19 RX ADMIN — OXYCODONE HYDROCHLORIDE AND ACETAMINOPHEN 500 MG: 500 TABLET ORAL at 11:08

## 2021-08-19 RX ADMIN — ALPRAZOLAM 0.25 MG: 0.25 TABLET ORAL at 09:08

## 2021-08-19 RX ADMIN — HYDROCODONE BITARTRATE AND ACETAMINOPHEN 1 TABLET: 5; 325 TABLET ORAL at 05:08

## 2021-08-19 RX ADMIN — INSULIN ASPART 4 UNITS: 100 INJECTION, SOLUTION INTRAVENOUS; SUBCUTANEOUS at 09:08

## 2021-08-19 RX ADMIN — BENZONATATE 200 MG: 100 CAPSULE ORAL at 11:08

## 2021-08-19 RX ADMIN — ALBUTEROL SULFATE 2 PUFF: 90 AEROSOL, METERED RESPIRATORY (INHALATION) at 07:08

## 2021-08-19 RX ADMIN — ALBUTEROL SULFATE 2 PUFF: 90 AEROSOL, METERED RESPIRATORY (INHALATION) at 12:08

## 2021-08-19 RX ADMIN — DEXAMETHASONE 6 MG: 2 TABLET ORAL at 11:08

## 2021-08-19 RX ADMIN — REMDESIVIR 200 MG: 100 INJECTION, POWDER, LYOPHILIZED, FOR SOLUTION INTRAVENOUS at 07:08

## 2021-08-19 RX ADMIN — MYCOPHENOLATE MOFETIL 1250 MG: 250 CAPSULE ORAL at 09:08

## 2021-08-19 RX ADMIN — ALBUTEROL SULFATE 2 PUFF: 90 AEROSOL, METERED RESPIRATORY (INHALATION) at 09:08

## 2021-08-19 RX ADMIN — BENZONATATE 200 MG: 100 CAPSULE ORAL at 05:08

## 2021-08-19 RX ADMIN — INSULIN ASPART 8 UNITS: 100 INJECTION, SOLUTION INTRAVENOUS; SUBCUTANEOUS at 05:08

## 2021-08-19 RX ADMIN — ENOXAPARIN SODIUM 100 MG: 100 INJECTION SUBCUTANEOUS at 11:08

## 2021-08-19 RX ADMIN — OXYCODONE HYDROCHLORIDE AND ACETAMINOPHEN 500 MG: 500 TABLET ORAL at 09:08

## 2021-08-19 RX ADMIN — ENOXAPARIN SODIUM 100 MG: 100 INJECTION SUBCUTANEOUS at 09:08

## 2021-08-19 RX ADMIN — AMLODIPINE BESYLATE 5 MG: 5 TABLET ORAL at 05:08

## 2021-08-19 RX ADMIN — LISINOPRIL 20 MG: 10 TABLET ORAL at 05:08

## 2021-08-19 RX ADMIN — ATORVASTATIN CALCIUM 10 MG: 10 TABLET, FILM COATED ORAL at 09:08

## 2021-08-19 RX ADMIN — DEXAMETHASONE 6 MG: 2 TABLET ORAL at 05:08

## 2021-08-19 RX ADMIN — THERA TABS 1 TABLET: TAB at 11:08

## 2021-08-19 RX ADMIN — HYDROCODONE BITARTRATE AND ACETAMINOPHEN 1 TABLET: 5; 325 TABLET ORAL at 11:08

## 2021-08-20 LAB
ALBUMIN SERPL BCP-MCNC: 2.8 G/DL (ref 3.5–5.2)
ALP SERPL-CCNC: 51 U/L (ref 55–135)
ALT SERPL W/O P-5'-P-CCNC: 17 U/L (ref 10–44)
ANION GAP SERPL CALC-SCNC: 15 MMOL/L (ref 8–16)
AST SERPL-CCNC: 21 U/L (ref 10–40)
BASOPHILS # BLD AUTO: 0.01 K/UL (ref 0–0.2)
BASOPHILS NFR BLD: 0.1 % (ref 0–1.9)
BILIRUB SERPL-MCNC: 0.6 MG/DL (ref 0.1–1)
BUN SERPL-MCNC: 17 MG/DL (ref 8–23)
CALCIUM SERPL-MCNC: 8.9 MG/DL (ref 8.7–10.5)
CHLORIDE SERPL-SCNC: 97 MMOL/L (ref 95–110)
CO2 SERPL-SCNC: 24 MMOL/L (ref 23–29)
CREAT SERPL-MCNC: 0.9 MG/DL (ref 0.5–1.4)
DIFFERENTIAL METHOD: ABNORMAL
EOSINOPHIL # BLD AUTO: 0 K/UL (ref 0–0.5)
EOSINOPHIL NFR BLD: 0 % (ref 0–8)
ERYTHROCYTE [DISTWIDTH] IN BLOOD BY AUTOMATED COUNT: 11.9 % (ref 11.5–14.5)
EST. GFR  (AFRICAN AMERICAN): >60 ML/MIN/1.73 M^2
EST. GFR  (NON AFRICAN AMERICAN): >60 ML/MIN/1.73 M^2
GLUCOSE SERPL-MCNC: 356 MG/DL (ref 70–110)
HCT VFR BLD AUTO: 40.9 % (ref 40–54)
HGB BLD-MCNC: 14 G/DL (ref 14–18)
IMM GRANULOCYTES # BLD AUTO: 0.08 K/UL (ref 0–0.04)
IMM GRANULOCYTES NFR BLD AUTO: 1 % (ref 0–0.5)
LYMPHOCYTES # BLD AUTO: 0.9 K/UL (ref 1–4.8)
LYMPHOCYTES NFR BLD: 10.5 % (ref 18–48)
MAGNESIUM SERPL-MCNC: 1.4 MG/DL (ref 1.6–2.6)
MCH RBC QN AUTO: 31.5 PG (ref 27–31)
MCHC RBC AUTO-ENTMCNC: 34.2 G/DL (ref 32–36)
MCV RBC AUTO: 92 FL (ref 82–98)
MONOCYTES # BLD AUTO: 0.5 K/UL (ref 0.3–1)
MONOCYTES NFR BLD: 6.5 % (ref 4–15)
NEUTROPHILS # BLD AUTO: 6.8 K/UL (ref 1.8–7.7)
NEUTROPHILS NFR BLD: 81.9 % (ref 38–73)
NRBC BLD-RTO: 0 /100 WBC
PHOSPHATE SERPL-MCNC: 3.7 MG/DL (ref 2.7–4.5)
PLATELET # BLD AUTO: 279 K/UL (ref 150–450)
PMV BLD AUTO: 9.7 FL (ref 9.2–12.9)
POCT GLUCOSE: 321 MG/DL (ref 70–110)
POCT GLUCOSE: 332 MG/DL (ref 70–110)
POCT GLUCOSE: 334 MG/DL (ref 70–110)
POCT GLUCOSE: 357 MG/DL (ref 70–110)
POTASSIUM SERPL-SCNC: 4.6 MMOL/L (ref 3.5–5.1)
PROT SERPL-MCNC: 6.5 G/DL (ref 6–8.4)
RBC # BLD AUTO: 4.45 M/UL (ref 4.6–6.2)
SODIUM SERPL-SCNC: 136 MMOL/L (ref 136–145)
WBC # BLD AUTO: 8.32 K/UL (ref 3.9–12.7)

## 2021-08-20 PROCEDURE — 80053 COMPREHEN METABOLIC PANEL: CPT | Performed by: NURSE PRACTITIONER

## 2021-08-20 PROCEDURE — 83735 ASSAY OF MAGNESIUM: CPT | Performed by: NURSE PRACTITIONER

## 2021-08-20 PROCEDURE — 27000207 HC ISOLATION

## 2021-08-20 PROCEDURE — 94640 AIRWAY INHALATION TREATMENT: CPT

## 2021-08-20 PROCEDURE — C9399 UNCLASSIFIED DRUGS OR BIOLOG: HCPCS | Performed by: HOSPITALIST

## 2021-08-20 PROCEDURE — 25000003 PHARM REV CODE 250: Performed by: HOSPITALIST

## 2021-08-20 PROCEDURE — 36415 COLL VENOUS BLD VENIPUNCTURE: CPT | Performed by: NURSE PRACTITIONER

## 2021-08-20 PROCEDURE — 63600175 PHARM REV CODE 636 W HCPCS: Performed by: INTERNAL MEDICINE

## 2021-08-20 PROCEDURE — 25000003 PHARM REV CODE 250: Performed by: INTERNAL MEDICINE

## 2021-08-20 PROCEDURE — 27000221 HC OXYGEN, UP TO 24 HOURS

## 2021-08-20 PROCEDURE — 25000003 PHARM REV CODE 250: Performed by: NURSE PRACTITIONER

## 2021-08-20 PROCEDURE — 63600175 PHARM REV CODE 636 W HCPCS: Performed by: NURSE PRACTITIONER

## 2021-08-20 PROCEDURE — 63600175 PHARM REV CODE 636 W HCPCS: Performed by: HOSPITALIST

## 2021-08-20 PROCEDURE — 84100 ASSAY OF PHOSPHORUS: CPT | Performed by: NURSE PRACTITIONER

## 2021-08-20 PROCEDURE — C1751 CATH, INF, PER/CENT/MIDLINE: HCPCS

## 2021-08-20 PROCEDURE — 94799 UNLISTED PULMONARY SVC/PX: CPT

## 2021-08-20 PROCEDURE — 94761 N-INVAS EAR/PLS OXIMETRY MLT: CPT

## 2021-08-20 PROCEDURE — 99233 SBSQ HOSP IP/OBS HIGH 50: CPT | Mod: ,,, | Performed by: INTERNAL MEDICINE

## 2021-08-20 PROCEDURE — 76937 US GUIDE VASCULAR ACCESS: CPT

## 2021-08-20 PROCEDURE — 85025 COMPLETE CBC W/AUTO DIFF WBC: CPT | Performed by: NURSE PRACTITIONER

## 2021-08-20 PROCEDURE — 12000002 HC ACUTE/MED SURGE SEMI-PRIVATE ROOM

## 2021-08-20 PROCEDURE — 36410 VNPNXR 3YR/> PHY/QHP DX/THER: CPT

## 2021-08-20 PROCEDURE — 25000242 PHARM REV CODE 250 ALT 637 W/ HCPCS: Performed by: INTERNAL MEDICINE

## 2021-08-20 PROCEDURE — 99233 PR SUBSEQUENT HOSPITAL CARE,LEVL III: ICD-10-PCS | Mod: ,,, | Performed by: INTERNAL MEDICINE

## 2021-08-20 RX ORDER — INSULIN ASPART 100 [IU]/ML
12 INJECTION, SOLUTION INTRAVENOUS; SUBCUTANEOUS
Status: DISCONTINUED | OUTPATIENT
Start: 2021-08-20 | End: 2021-08-21

## 2021-08-20 RX ORDER — FLUTICASONE PROPIONATE 50 MCG
2 SPRAY, SUSPENSION (ML) NASAL DAILY
Qty: 16 G | Refills: 5 | Status: SHIPPED | OUTPATIENT
Start: 2021-08-21 | End: 2024-03-20

## 2021-08-20 RX ORDER — HYDROCODONE BITARTRATE AND ACETAMINOPHEN 5; 325 MG/1; MG/1
1 TABLET ORAL EVERY 6 HOURS PRN
Qty: 20 TABLET | Refills: 0 | Status: SHIPPED | OUTPATIENT
Start: 2021-08-20 | End: 2022-02-17 | Stop reason: ALTCHOICE

## 2021-08-20 RX ORDER — ALPRAZOLAM 0.25 MG/1
0.25 TABLET ORAL 3 TIMES DAILY PRN
Qty: 30 TABLET | Refills: 0 | Status: SHIPPED | OUTPATIENT
Start: 2021-08-20 | End: 2022-02-17 | Stop reason: ALTCHOICE

## 2021-08-20 RX ADMIN — LISINOPRIL 20 MG: 10 TABLET ORAL at 08:08

## 2021-08-20 RX ADMIN — INSULIN ASPART 8 UNITS: 100 INJECTION, SOLUTION INTRAVENOUS; SUBCUTANEOUS at 11:08

## 2021-08-20 RX ADMIN — MYCOPHENOLATE MOFETIL 1250 MG: 250 CAPSULE ORAL at 10:08

## 2021-08-20 RX ADMIN — ATORVASTATIN CALCIUM 10 MG: 10 TABLET, FILM COATED ORAL at 08:08

## 2021-08-20 RX ADMIN — INSULIN ASPART 8 UNITS: 100 INJECTION, SOLUTION INTRAVENOUS; SUBCUTANEOUS at 06:08

## 2021-08-20 RX ADMIN — INSULIN ASPART 12 UNITS: 100 INJECTION, SOLUTION INTRAVENOUS; SUBCUTANEOUS at 05:08

## 2021-08-20 RX ADMIN — HYDROCODONE BITARTRATE AND ACETAMINOPHEN 1 TABLET: 5; 325 TABLET ORAL at 06:08

## 2021-08-20 RX ADMIN — ENOXAPARIN SODIUM 100 MG: 100 INJECTION SUBCUTANEOUS at 08:08

## 2021-08-20 RX ADMIN — INSULIN ASPART 25 UNITS: 100 INJECTION, SUSPENSION SUBCUTANEOUS at 08:08

## 2021-08-20 RX ADMIN — FLUTICASONE PROPIONATE 100 MCG: 50 SPRAY, METERED NASAL at 08:08

## 2021-08-20 RX ADMIN — ALBUTEROL SULFATE 2 PUFF: 90 AEROSOL, METERED RESPIRATORY (INHALATION) at 07:08

## 2021-08-20 RX ADMIN — INSULIN ASPART 10 UNITS: 100 INJECTION, SOLUTION INTRAVENOUS; SUBCUTANEOUS at 05:08

## 2021-08-20 RX ADMIN — INSULIN DETEMIR 20 UNITS: 100 INJECTION, SOLUTION SUBCUTANEOUS at 08:08

## 2021-08-20 RX ADMIN — THERA TABS 1 TABLET: TAB at 08:08

## 2021-08-20 RX ADMIN — MYCOPHENOLATE MOFETIL 1250 MG: 250 CAPSULE ORAL at 08:08

## 2021-08-20 RX ADMIN — OXYMETAZOLINE HCL 1 SPRAY: 0.05 SPRAY NASAL at 08:08

## 2021-08-20 RX ADMIN — OXYCODONE HYDROCHLORIDE AND ACETAMINOPHEN 500 MG: 500 TABLET ORAL at 08:08

## 2021-08-20 RX ADMIN — MELATONIN TAB 3 MG 6 MG: 3 TAB at 08:08

## 2021-08-20 RX ADMIN — ASPIRIN 81 MG: 81 TABLET, COATED ORAL at 08:08

## 2021-08-20 RX ADMIN — HYDROCODONE BITARTRATE AND ACETAMINOPHEN 1 TABLET: 5; 325 TABLET ORAL at 05:08

## 2021-08-20 RX ADMIN — ALBUTEROL SULFATE 2 PUFF: 90 AEROSOL, METERED RESPIRATORY (INHALATION) at 12:08

## 2021-08-20 RX ADMIN — ALBUTEROL SULFATE 2 PUFF: 90 AEROSOL, METERED RESPIRATORY (INHALATION) at 06:08

## 2021-08-20 RX ADMIN — REMDESIVIR 100 MG: 100 INJECTION, POWDER, LYOPHILIZED, FOR SOLUTION INTRAVENOUS at 08:08

## 2021-08-20 RX ADMIN — DEXAMETHASONE 6 MG: 2 TABLET ORAL at 08:08

## 2021-08-20 RX ADMIN — AMLODIPINE BESYLATE 5 MG: 5 TABLET ORAL at 08:08

## 2021-08-20 RX ADMIN — ALBUTEROL SULFATE 2 PUFF: 90 AEROSOL, METERED RESPIRATORY (INHALATION) at 01:08

## 2021-08-20 RX ADMIN — HYDROCODONE BITARTRATE AND ACETAMINOPHEN 1 TABLET: 5; 325 TABLET ORAL at 11:08

## 2021-08-21 LAB
ALBUMIN SERPL BCP-MCNC: 2.8 G/DL (ref 3.5–5.2)
ALP SERPL-CCNC: 47 U/L (ref 55–135)
ALT SERPL W/O P-5'-P-CCNC: 17 U/L (ref 10–44)
ANION GAP SERPL CALC-SCNC: 13 MMOL/L (ref 8–16)
ANION GAP SERPL CALC-SCNC: 9 MMOL/L (ref 8–16)
AST SERPL-CCNC: 17 U/L (ref 10–40)
BASOPHILS # BLD AUTO: 0.01 K/UL (ref 0–0.2)
BASOPHILS NFR BLD: 0.1 % (ref 0–1.9)
BILIRUB SERPL-MCNC: 0.4 MG/DL (ref 0.1–1)
BUN SERPL-MCNC: 23 MG/DL (ref 8–23)
BUN SERPL-MCNC: 23 MG/DL (ref 8–23)
CALCIUM SERPL-MCNC: 8.6 MG/DL (ref 8.7–10.5)
CALCIUM SERPL-MCNC: 8.6 MG/DL (ref 8.7–10.5)
CHLORIDE SERPL-SCNC: 98 MMOL/L (ref 95–110)
CHLORIDE SERPL-SCNC: 99 MMOL/L (ref 95–110)
CO2 SERPL-SCNC: 24 MMOL/L (ref 23–29)
CO2 SERPL-SCNC: 30 MMOL/L (ref 23–29)
CREAT SERPL-MCNC: 0.9 MG/DL (ref 0.5–1.4)
CREAT SERPL-MCNC: 0.9 MG/DL (ref 0.5–1.4)
CRP SERPL-MCNC: 31.9 MG/L (ref 0–8.2)
D DIMER PPP IA.FEU-MCNC: 2.2 MG/L FEU
DIFFERENTIAL METHOD: ABNORMAL
EOSINOPHIL # BLD AUTO: 0 K/UL (ref 0–0.5)
EOSINOPHIL NFR BLD: 0 % (ref 0–8)
ERYTHROCYTE [DISTWIDTH] IN BLOOD BY AUTOMATED COUNT: 12.1 % (ref 11.5–14.5)
EST. GFR  (AFRICAN AMERICAN): >60 ML/MIN/1.73 M^2
EST. GFR  (AFRICAN AMERICAN): >60 ML/MIN/1.73 M^2
EST. GFR  (NON AFRICAN AMERICAN): >60 ML/MIN/1.73 M^2
EST. GFR  (NON AFRICAN AMERICAN): >60 ML/MIN/1.73 M^2
ESTIMATED AVG GLUCOSE: 217 MG/DL (ref 68–131)
FERRITIN SERPL-MCNC: 400 NG/ML (ref 20–300)
GLUCOSE SERPL-MCNC: 295 MG/DL (ref 70–110)
GLUCOSE SERPL-MCNC: 399 MG/DL (ref 70–110)
GLUCOSE SERPL-MCNC: 443 MG/DL (ref 70–110)
HBA1C MFR BLD: 9.2 % (ref 4–5.6)
HCT VFR BLD AUTO: 40.1 % (ref 40–54)
HGB BLD-MCNC: 13.5 G/DL (ref 14–18)
IMM GRANULOCYTES # BLD AUTO: 0.12 K/UL (ref 0–0.04)
IMM GRANULOCYTES NFR BLD AUTO: 0.9 % (ref 0–0.5)
LDH SERPL L TO P-CCNC: 173 U/L (ref 110–260)
LYMPHOCYTES # BLD AUTO: 1.4 K/UL (ref 1–4.8)
LYMPHOCYTES NFR BLD: 10.6 % (ref 18–48)
MAGNESIUM SERPL-MCNC: 1.6 MG/DL (ref 1.6–2.6)
MCH RBC QN AUTO: 31.3 PG (ref 27–31)
MCHC RBC AUTO-ENTMCNC: 33.7 G/DL (ref 32–36)
MCV RBC AUTO: 93 FL (ref 82–98)
MONOCYTES # BLD AUTO: 1.3 K/UL (ref 0.3–1)
MONOCYTES NFR BLD: 9.5 % (ref 4–15)
NEUTROPHILS # BLD AUTO: 10.7 K/UL (ref 1.8–7.7)
NEUTROPHILS NFR BLD: 78.9 % (ref 38–73)
NRBC BLD-RTO: 0 /100 WBC
PHOSPHATE SERPL-MCNC: 3.8 MG/DL (ref 2.7–4.5)
PLATELET # BLD AUTO: 360 K/UL (ref 150–450)
PMV BLD AUTO: 9.3 FL (ref 9.2–12.9)
POCT GLUCOSE: 290 MG/DL (ref 70–110)
POCT GLUCOSE: 306 MG/DL (ref 70–110)
POCT GLUCOSE: 406 MG/DL (ref 70–110)
POCT GLUCOSE: 411 MG/DL (ref 70–110)
POCT GLUCOSE: 441 MG/DL (ref 70–110)
POTASSIUM SERPL-SCNC: 4.5 MMOL/L (ref 3.5–5.1)
POTASSIUM SERPL-SCNC: 4.5 MMOL/L (ref 3.5–5.1)
PROT SERPL-MCNC: 6.3 G/DL (ref 6–8.4)
RBC # BLD AUTO: 4.31 M/UL (ref 4.6–6.2)
SODIUM SERPL-SCNC: 136 MMOL/L (ref 136–145)
SODIUM SERPL-SCNC: 137 MMOL/L (ref 136–145)
WBC # BLD AUTO: 13.57 K/UL (ref 3.9–12.7)

## 2021-08-21 PROCEDURE — 99900035 HC TECH TIME PER 15 MIN (STAT)

## 2021-08-21 PROCEDURE — 87077 CULTURE AEROBIC IDENTIFY: CPT | Performed by: HOSPITALIST

## 2021-08-21 PROCEDURE — 25000003 PHARM REV CODE 250: Performed by: HOSPITALIST

## 2021-08-21 PROCEDURE — 83615 LACTATE (LD) (LDH) ENZYME: CPT | Performed by: HOSPITALIST

## 2021-08-21 PROCEDURE — 83036 HEMOGLOBIN GLYCOSYLATED A1C: CPT | Performed by: HOSPITALIST

## 2021-08-21 PROCEDURE — 25500020 PHARM REV CODE 255

## 2021-08-21 PROCEDURE — 27000207 HC ISOLATION

## 2021-08-21 PROCEDURE — 80053 COMPREHEN METABOLIC PANEL: CPT | Performed by: NURSE PRACTITIONER

## 2021-08-21 PROCEDURE — 25000003 PHARM REV CODE 250: Performed by: INTERNAL MEDICINE

## 2021-08-21 PROCEDURE — 87205 SMEAR GRAM STAIN: CPT | Performed by: HOSPITALIST

## 2021-08-21 PROCEDURE — 27000221 HC OXYGEN, UP TO 24 HOURS

## 2021-08-21 PROCEDURE — 84100 ASSAY OF PHOSPHORUS: CPT | Performed by: NURSE PRACTITIONER

## 2021-08-21 PROCEDURE — 82728 ASSAY OF FERRITIN: CPT | Performed by: HOSPITALIST

## 2021-08-21 PROCEDURE — 99232 PR SUBSEQUENT HOSPITAL CARE,LEVL II: ICD-10-PCS | Mod: ,,, | Performed by: INTERNAL MEDICINE

## 2021-08-21 PROCEDURE — 63600175 PHARM REV CODE 636 W HCPCS: Performed by: INTERNAL MEDICINE

## 2021-08-21 PROCEDURE — 36415 COLL VENOUS BLD VENIPUNCTURE: CPT | Performed by: NURSE PRACTITIONER

## 2021-08-21 PROCEDURE — 85379 FIBRIN DEGRADATION QUANT: CPT | Performed by: NURSE PRACTITIONER

## 2021-08-21 PROCEDURE — 25000003 PHARM REV CODE 250: Performed by: NURSE PRACTITIONER

## 2021-08-21 PROCEDURE — C9399 UNCLASSIFIED DRUGS OR BIOLOG: HCPCS | Performed by: HOSPITALIST

## 2021-08-21 PROCEDURE — 82947 ASSAY GLUCOSE BLOOD QUANT: CPT | Performed by: HOSPITALIST

## 2021-08-21 PROCEDURE — 85025 COMPLETE CBC W/AUTO DIFF WBC: CPT | Performed by: NURSE PRACTITIONER

## 2021-08-21 PROCEDURE — 94799 UNLISTED PULMONARY SVC/PX: CPT

## 2021-08-21 PROCEDURE — 25000242 PHARM REV CODE 250 ALT 637 W/ HCPCS: Performed by: INTERNAL MEDICINE

## 2021-08-21 PROCEDURE — 83735 ASSAY OF MAGNESIUM: CPT | Performed by: NURSE PRACTITIONER

## 2021-08-21 PROCEDURE — 63600175 PHARM REV CODE 636 W HCPCS: Performed by: NURSE PRACTITIONER

## 2021-08-21 PROCEDURE — 87070 CULTURE OTHR SPECIMN AEROBIC: CPT | Performed by: HOSPITALIST

## 2021-08-21 PROCEDURE — 63600175 PHARM REV CODE 636 W HCPCS: Performed by: HOSPITALIST

## 2021-08-21 PROCEDURE — 86140 C-REACTIVE PROTEIN: CPT | Performed by: HOSPITALIST

## 2021-08-21 PROCEDURE — 94761 N-INVAS EAR/PLS OXIMETRY MLT: CPT

## 2021-08-21 PROCEDURE — 80048 BASIC METABOLIC PNL TOTAL CA: CPT | Performed by: NURSE PRACTITIONER

## 2021-08-21 PROCEDURE — 99232 SBSQ HOSP IP/OBS MODERATE 35: CPT | Mod: ,,, | Performed by: INTERNAL MEDICINE

## 2021-08-21 PROCEDURE — 87186 SC STD MICRODIL/AGAR DIL: CPT | Performed by: HOSPITALIST

## 2021-08-21 PROCEDURE — 94640 AIRWAY INHALATION TREATMENT: CPT

## 2021-08-21 PROCEDURE — 36415 COLL VENOUS BLD VENIPUNCTURE: CPT | Performed by: HOSPITALIST

## 2021-08-21 PROCEDURE — 12000002 HC ACUTE/MED SURGE SEMI-PRIVATE ROOM

## 2021-08-21 RX ORDER — LISINOPRIL 40 MG/1
40 TABLET ORAL DAILY
Status: DISCONTINUED | OUTPATIENT
Start: 2021-08-21 | End: 2021-08-23 | Stop reason: HOSPADM

## 2021-08-21 RX ORDER — INSULIN ASPART 100 [IU]/ML
18 INJECTION, SOLUTION INTRAVENOUS; SUBCUTANEOUS
Status: DISCONTINUED | OUTPATIENT
Start: 2021-08-21 | End: 2021-08-22

## 2021-08-21 RX ORDER — ENOXAPARIN SODIUM 100 MG/ML
40 INJECTION SUBCUTANEOUS EVERY 24 HOURS
Status: DISCONTINUED | OUTPATIENT
Start: 2021-08-21 | End: 2021-08-23 | Stop reason: HOSPADM

## 2021-08-21 RX ORDER — DOXYCYCLINE HYCLATE 100 MG
100 TABLET ORAL EVERY 12 HOURS
Status: DISCONTINUED | OUTPATIENT
Start: 2021-08-21 | End: 2021-08-23 | Stop reason: HOSPADM

## 2021-08-21 RX ORDER — LISINOPRIL 10 MG/1
20 TABLET ORAL ONCE
Status: COMPLETED | OUTPATIENT
Start: 2021-08-21 | End: 2021-08-21

## 2021-08-21 RX ORDER — AZITHROMYCIN 500 MG/1
TABLET, FILM COATED ORAL
Qty: 3 TABLET | Refills: 3 | Status: SHIPPED | OUTPATIENT
Start: 2021-08-21 | End: 2022-02-07

## 2021-08-21 RX ADMIN — HYDROCODONE BITARTRATE AND ACETAMINOPHEN 1 TABLET: 5; 325 TABLET ORAL at 05:08

## 2021-08-21 RX ADMIN — OXYCODONE HYDROCHLORIDE AND ACETAMINOPHEN 500 MG: 500 TABLET ORAL at 09:08

## 2021-08-21 RX ADMIN — ENOXAPARIN SODIUM 100 MG: 100 INJECTION SUBCUTANEOUS at 08:08

## 2021-08-21 RX ADMIN — Medication 800 MG: at 09:08

## 2021-08-21 RX ADMIN — HYDROCODONE BITARTRATE AND ACETAMINOPHEN 1 TABLET: 5; 325 TABLET ORAL at 12:08

## 2021-08-21 RX ADMIN — OXYCODONE HYDROCHLORIDE AND ACETAMINOPHEN 500 MG: 500 TABLET ORAL at 08:08

## 2021-08-21 RX ADMIN — ALBUTEROL SULFATE 2 PUFF: 90 AEROSOL, METERED RESPIRATORY (INHALATION) at 12:08

## 2021-08-21 RX ADMIN — INSULIN ASPART 18 UNITS: 100 INJECTION, SOLUTION INTRAVENOUS; SUBCUTANEOUS at 05:08

## 2021-08-21 RX ADMIN — ATORVASTATIN CALCIUM 10 MG: 10 TABLET, FILM COATED ORAL at 08:08

## 2021-08-21 RX ADMIN — DEXAMETHASONE 6 MG: 2 TABLET ORAL at 09:08

## 2021-08-21 RX ADMIN — DOXYCYCLINE HYCLATE 100 MG: 100 TABLET, COATED ORAL at 12:08

## 2021-08-21 RX ADMIN — Medication 800 MG: at 08:08

## 2021-08-21 RX ADMIN — ALBUTEROL SULFATE 2 PUFF: 90 AEROSOL, METERED RESPIRATORY (INHALATION) at 06:08

## 2021-08-21 RX ADMIN — ASPIRIN 81 MG: 81 TABLET, COATED ORAL at 09:08

## 2021-08-21 RX ADMIN — INSULIN ASPART 8 UNITS: 100 INJECTION, SOLUTION INTRAVENOUS; SUBCUTANEOUS at 12:08

## 2021-08-21 RX ADMIN — REMDESIVIR 100 MG: 100 INJECTION, POWDER, LYOPHILIZED, FOR SOLUTION INTRAVENOUS at 08:08

## 2021-08-21 RX ADMIN — INSULIN ASPART 10 UNITS: 100 INJECTION, SOLUTION INTRAVENOUS; SUBCUTANEOUS at 05:08

## 2021-08-21 RX ADMIN — IOHEXOL 100 ML: 350 INJECTION, SOLUTION INTRAVENOUS at 09:08

## 2021-08-21 RX ADMIN — MYCOPHENOLATE MOFETIL 1250 MG: 250 CAPSULE ORAL at 08:08

## 2021-08-21 RX ADMIN — LISINOPRIL 20 MG: 10 TABLET ORAL at 02:08

## 2021-08-21 RX ADMIN — HYDROCODONE BITARTRATE AND ACETAMINOPHEN 1 TABLET: 5; 325 TABLET ORAL at 06:08

## 2021-08-21 RX ADMIN — INSULIN ASPART 4 UNITS: 100 INJECTION, SOLUTION INTRAVENOUS; SUBCUTANEOUS at 12:08

## 2021-08-21 RX ADMIN — DOXYCYCLINE HYCLATE 100 MG: 100 TABLET, COATED ORAL at 08:08

## 2021-08-21 RX ADMIN — INSULIN ASPART 6 UNITS: 100 INJECTION, SOLUTION INTRAVENOUS; SUBCUTANEOUS at 06:08

## 2021-08-21 RX ADMIN — LISINOPRIL 20 MG: 10 TABLET ORAL at 09:08

## 2021-08-21 RX ADMIN — INSULIN DETEMIR 25 UNITS: 100 INJECTION, SOLUTION SUBCUTANEOUS at 08:08

## 2021-08-21 RX ADMIN — ALBUTEROL SULFATE 2 PUFF: 90 AEROSOL, METERED RESPIRATORY (INHALATION) at 07:08

## 2021-08-21 RX ADMIN — INSULIN ASPART 12 UNITS: 100 INJECTION, SOLUTION INTRAVENOUS; SUBCUTANEOUS at 09:08

## 2021-08-21 RX ADMIN — THERA TABS 1 TABLET: TAB at 09:08

## 2021-08-21 RX ADMIN — CEFTRIAXONE 1 G: 1 INJECTION, SOLUTION INTRAVENOUS at 12:08

## 2021-08-21 RX ADMIN — FLUTICASONE PROPIONATE 100 MCG: 50 SPRAY, METERED NASAL at 09:08

## 2021-08-21 RX ADMIN — HYDROCODONE BITARTRATE AND ACETAMINOPHEN 1 TABLET: 5; 325 TABLET ORAL at 11:08

## 2021-08-21 RX ADMIN — AMLODIPINE BESYLATE 5 MG: 5 TABLET ORAL at 09:08

## 2021-08-21 RX ADMIN — INSULIN ASPART 5 UNITS: 100 INJECTION, SOLUTION INTRAVENOUS; SUBCUTANEOUS at 08:08

## 2021-08-22 LAB
ALBUMIN SERPL BCP-MCNC: 3 G/DL (ref 3.5–5.2)
ALP SERPL-CCNC: 55 U/L (ref 55–135)
ALT SERPL W/O P-5'-P-CCNC: 21 U/L (ref 10–44)
ANION GAP SERPL CALC-SCNC: 11 MMOL/L (ref 8–16)
AST SERPL-CCNC: 22 U/L (ref 10–40)
BASOPHILS # BLD AUTO: 0.02 K/UL (ref 0–0.2)
BASOPHILS NFR BLD: 0.2 % (ref 0–1.9)
BILIRUB SERPL-MCNC: 0.3 MG/DL (ref 0.1–1)
BUN SERPL-MCNC: 20 MG/DL (ref 8–23)
CALCIUM SERPL-MCNC: 8.8 MG/DL (ref 8.7–10.5)
CHLORIDE SERPL-SCNC: 98 MMOL/L (ref 95–110)
CO2 SERPL-SCNC: 30 MMOL/L (ref 23–29)
CREAT SERPL-MCNC: 0.8 MG/DL (ref 0.5–1.4)
DIFFERENTIAL METHOD: ABNORMAL
EOSINOPHIL # BLD AUTO: 0 K/UL (ref 0–0.5)
EOSINOPHIL NFR BLD: 0.2 % (ref 0–8)
ERYTHROCYTE [DISTWIDTH] IN BLOOD BY AUTOMATED COUNT: 12.1 % (ref 11.5–14.5)
EST. GFR  (AFRICAN AMERICAN): >60 ML/MIN/1.73 M^2
EST. GFR  (NON AFRICAN AMERICAN): >60 ML/MIN/1.73 M^2
GLUCOSE SERPL-MCNC: 294 MG/DL (ref 70–110)
HCT VFR BLD AUTO: 41.9 % (ref 40–54)
HGB BLD-MCNC: 14.2 G/DL (ref 14–18)
IMM GRANULOCYTES # BLD AUTO: 0.1 K/UL (ref 0–0.04)
IMM GRANULOCYTES NFR BLD AUTO: 0.9 % (ref 0–0.5)
LYMPHOCYTES # BLD AUTO: 1.7 K/UL (ref 1–4.8)
LYMPHOCYTES NFR BLD: 15.6 % (ref 18–48)
MAGNESIUM SERPL-MCNC: 1.8 MG/DL (ref 1.6–2.6)
MCH RBC QN AUTO: 31.3 PG (ref 27–31)
MCHC RBC AUTO-ENTMCNC: 33.9 G/DL (ref 32–36)
MCV RBC AUTO: 93 FL (ref 82–98)
MONOCYTES # BLD AUTO: 1.4 K/UL (ref 0.3–1)
MONOCYTES NFR BLD: 12.2 % (ref 4–15)
NEUTROPHILS # BLD AUTO: 7.9 K/UL (ref 1.8–7.7)
NEUTROPHILS NFR BLD: 70.9 % (ref 38–73)
NRBC BLD-RTO: 0 /100 WBC
PHOSPHATE SERPL-MCNC: 3.2 MG/DL (ref 2.7–4.5)
PLATELET # BLD AUTO: 372 K/UL (ref 150–450)
PMV BLD AUTO: 9.1 FL (ref 9.2–12.9)
POCT GLUCOSE: 215 MG/DL (ref 70–110)
POCT GLUCOSE: 300 MG/DL (ref 70–110)
POCT GLUCOSE: 346 MG/DL (ref 70–110)
POCT GLUCOSE: 385 MG/DL (ref 70–110)
POTASSIUM SERPL-SCNC: 4.6 MMOL/L (ref 3.5–5.1)
PROT SERPL-MCNC: 6.4 G/DL (ref 6–8.4)
RBC # BLD AUTO: 4.53 M/UL (ref 4.6–6.2)
SODIUM SERPL-SCNC: 139 MMOL/L (ref 136–145)
WBC # BLD AUTO: 11.08 K/UL (ref 3.9–12.7)

## 2021-08-22 PROCEDURE — 99231 PR SUBSEQUENT HOSPITAL CARE,LEVL I: ICD-10-PCS | Mod: ,,, | Performed by: INTERNAL MEDICINE

## 2021-08-22 PROCEDURE — 25000003 PHARM REV CODE 250: Performed by: INTERNAL MEDICINE

## 2021-08-22 PROCEDURE — 27000207 HC ISOLATION

## 2021-08-22 PROCEDURE — 97162 PT EVAL MOD COMPLEX 30 MIN: CPT

## 2021-08-22 PROCEDURE — 63600175 PHARM REV CODE 636 W HCPCS: Performed by: HOSPITALIST

## 2021-08-22 PROCEDURE — 25000003 PHARM REV CODE 250: Performed by: NURSE PRACTITIONER

## 2021-08-22 PROCEDURE — 83735 ASSAY OF MAGNESIUM: CPT | Performed by: NURSE PRACTITIONER

## 2021-08-22 PROCEDURE — 12000002 HC ACUTE/MED SURGE SEMI-PRIVATE ROOM

## 2021-08-22 PROCEDURE — 94640 AIRWAY INHALATION TREATMENT: CPT

## 2021-08-22 PROCEDURE — 25000242 PHARM REV CODE 250 ALT 637 W/ HCPCS: Performed by: INTERNAL MEDICINE

## 2021-08-22 PROCEDURE — 94799 UNLISTED PULMONARY SVC/PX: CPT

## 2021-08-22 PROCEDURE — 84100 ASSAY OF PHOSPHORUS: CPT | Performed by: NURSE PRACTITIONER

## 2021-08-22 PROCEDURE — 63600175 PHARM REV CODE 636 W HCPCS: Performed by: INTERNAL MEDICINE

## 2021-08-22 PROCEDURE — 27000221 HC OXYGEN, UP TO 24 HOURS

## 2021-08-22 PROCEDURE — 80053 COMPREHEN METABOLIC PANEL: CPT | Performed by: NURSE PRACTITIONER

## 2021-08-22 PROCEDURE — 25000003 PHARM REV CODE 250: Performed by: HOSPITALIST

## 2021-08-22 PROCEDURE — 85025 COMPLETE CBC W/AUTO DIFF WBC: CPT | Performed by: NURSE PRACTITIONER

## 2021-08-22 PROCEDURE — 94761 N-INVAS EAR/PLS OXIMETRY MLT: CPT

## 2021-08-22 PROCEDURE — 99231 SBSQ HOSP IP/OBS SF/LOW 25: CPT | Mod: ,,, | Performed by: INTERNAL MEDICINE

## 2021-08-22 PROCEDURE — 36415 COLL VENOUS BLD VENIPUNCTURE: CPT | Performed by: NURSE PRACTITIONER

## 2021-08-22 PROCEDURE — 97116 GAIT TRAINING THERAPY: CPT

## 2021-08-22 RX ORDER — CARVEDILOL 6.25 MG/1
6.25 TABLET ORAL 2 TIMES DAILY
Status: DISCONTINUED | OUTPATIENT
Start: 2021-08-22 | End: 2021-08-23

## 2021-08-22 RX ORDER — INSULIN ASPART 100 [IU]/ML
24 INJECTION, SOLUTION INTRAVENOUS; SUBCUTANEOUS
Status: DISCONTINUED | OUTPATIENT
Start: 2021-08-22 | End: 2021-08-22

## 2021-08-22 RX ORDER — LISINOPRIL 10 MG/1
20 TABLET ORAL DAILY
Status: DISCONTINUED | OUTPATIENT
Start: 2021-08-22 | End: 2021-08-22

## 2021-08-22 RX ORDER — INSULIN ASPART 100 [IU]/ML
18 INJECTION, SOLUTION INTRAVENOUS; SUBCUTANEOUS
Status: DISCONTINUED | OUTPATIENT
Start: 2021-08-22 | End: 2021-08-23 | Stop reason: HOSPADM

## 2021-08-22 RX ADMIN — CARVEDILOL 6.25 MG: 6.25 TABLET, FILM COATED ORAL at 01:08

## 2021-08-22 RX ADMIN — ALBUTEROL SULFATE 2 PUFF: 90 AEROSOL, METERED RESPIRATORY (INHALATION) at 07:08

## 2021-08-22 RX ADMIN — DOXYCYCLINE HYCLATE 100 MG: 100 TABLET, COATED ORAL at 08:08

## 2021-08-22 RX ADMIN — CEFTRIAXONE 1 G: 1 INJECTION, SOLUTION INTRAVENOUS at 01:08

## 2021-08-22 RX ADMIN — INSULIN ASPART 18 UNITS: 100 INJECTION, SOLUTION INTRAVENOUS; SUBCUTANEOUS at 06:08

## 2021-08-22 RX ADMIN — FLUTICASONE PROPIONATE 100 MCG: 50 SPRAY, METERED NASAL at 09:08

## 2021-08-22 RX ADMIN — THERA TABS 1 TABLET: TAB at 09:08

## 2021-08-22 RX ADMIN — MYCOPHENOLATE MOFETIL 1250 MG: 250 CAPSULE ORAL at 09:08

## 2021-08-22 RX ADMIN — DEXAMETHASONE 6 MG: 2 TABLET ORAL at 09:08

## 2021-08-22 RX ADMIN — HYDROCODONE BITARTRATE AND ACETAMINOPHEN 1 TABLET: 5; 325 TABLET ORAL at 06:08

## 2021-08-22 RX ADMIN — DOXYCYCLINE HYCLATE 100 MG: 100 TABLET, COATED ORAL at 09:08

## 2021-08-22 RX ADMIN — INSULIN ASPART 4 UNITS: 100 INJECTION, SOLUTION INTRAVENOUS; SUBCUTANEOUS at 11:08

## 2021-08-22 RX ADMIN — MYCOPHENOLATE MOFETIL 1250 MG: 250 CAPSULE ORAL at 08:08

## 2021-08-22 RX ADMIN — LISINOPRIL 40 MG: 40 TABLET ORAL at 09:08

## 2021-08-22 RX ADMIN — ALBUTEROL SULFATE 2 PUFF: 90 AEROSOL, METERED RESPIRATORY (INHALATION) at 12:08

## 2021-08-22 RX ADMIN — ALBUTEROL SULFATE 2 PUFF: 90 AEROSOL, METERED RESPIRATORY (INHALATION) at 08:08

## 2021-08-22 RX ADMIN — ATORVASTATIN CALCIUM 10 MG: 10 TABLET, FILM COATED ORAL at 08:08

## 2021-08-22 RX ADMIN — CARVEDILOL 6.25 MG: 6.25 TABLET, FILM COATED ORAL at 08:08

## 2021-08-22 RX ADMIN — INSULIN ASPART 8 UNITS: 100 INJECTION, SOLUTION INTRAVENOUS; SUBCUTANEOUS at 06:08

## 2021-08-22 RX ADMIN — OXYCODONE HYDROCHLORIDE AND ACETAMINOPHEN 500 MG: 500 TABLET ORAL at 09:08

## 2021-08-22 RX ADMIN — ASPIRIN 81 MG: 81 TABLET, COATED ORAL at 09:08

## 2021-08-22 RX ADMIN — INSULIN ASPART 18 UNITS: 100 INJECTION, SOLUTION INTRAVENOUS; SUBCUTANEOUS at 05:08

## 2021-08-22 RX ADMIN — OXYCODONE HYDROCHLORIDE AND ACETAMINOPHEN 500 MG: 500 TABLET ORAL at 08:08

## 2021-08-22 RX ADMIN — REMDESIVIR 100 MG: 100 INJECTION, POWDER, LYOPHILIZED, FOR SOLUTION INTRAVENOUS at 09:08

## 2021-08-22 RX ADMIN — ENOXAPARIN SODIUM 40 MG: 40 INJECTION SUBCUTANEOUS at 05:08

## 2021-08-23 VITALS
DIASTOLIC BLOOD PRESSURE: 68 MMHG | WEIGHT: 225.06 LBS | BODY MASS INDEX: 32.22 KG/M2 | HEART RATE: 63 BPM | TEMPERATURE: 98 F | OXYGEN SATURATION: 94 % | RESPIRATION RATE: 18 BRPM | SYSTOLIC BLOOD PRESSURE: 142 MMHG | HEIGHT: 70 IN

## 2021-08-23 LAB
ALBUMIN SERPL BCP-MCNC: 3 G/DL (ref 3.5–5.2)
ALP SERPL-CCNC: 44 U/L (ref 55–135)
ALT SERPL W/O P-5'-P-CCNC: 29 U/L (ref 10–44)
ANION GAP SERPL CALC-SCNC: 11 MMOL/L (ref 8–16)
AST SERPL-CCNC: 23 U/L (ref 10–40)
BASOPHILS # BLD AUTO: 0.01 K/UL (ref 0–0.2)
BASOPHILS NFR BLD: 0.1 % (ref 0–1.9)
BILIRUB SERPL-MCNC: 0.5 MG/DL (ref 0.1–1)
BUN SERPL-MCNC: 21 MG/DL (ref 8–23)
CALCIUM SERPL-MCNC: 8.6 MG/DL (ref 8.7–10.5)
CHLORIDE SERPL-SCNC: 98 MMOL/L (ref 95–110)
CO2 SERPL-SCNC: 27 MMOL/L (ref 23–29)
CREAT SERPL-MCNC: 0.9 MG/DL (ref 0.5–1.4)
CRP SERPL-MCNC: 9.6 MG/L (ref 0–8.2)
D DIMER PPP IA.FEU-MCNC: 2.19 MG/L FEU
DIFFERENTIAL METHOD: ABNORMAL
EOSINOPHIL # BLD AUTO: 0.1 K/UL (ref 0–0.5)
EOSINOPHIL NFR BLD: 0.7 % (ref 0–8)
ERYTHROCYTE [DISTWIDTH] IN BLOOD BY AUTOMATED COUNT: 12.3 % (ref 11.5–14.5)
EST. GFR  (AFRICAN AMERICAN): >60 ML/MIN/1.73 M^2
EST. GFR  (NON AFRICAN AMERICAN): >60 ML/MIN/1.73 M^2
FERRITIN SERPL-MCNC: 381 NG/ML (ref 20–300)
GLUCOSE SERPL-MCNC: 281 MG/DL (ref 70–110)
HCT VFR BLD AUTO: 41 % (ref 40–54)
HGB BLD-MCNC: 13.8 G/DL (ref 14–18)
IMM GRANULOCYTES # BLD AUTO: 0.11 K/UL (ref 0–0.04)
IMM GRANULOCYTES NFR BLD AUTO: 1.2 % (ref 0–0.5)
LDH SERPL L TO P-CCNC: 171 U/L (ref 110–260)
LYMPHOCYTES # BLD AUTO: 2.3 K/UL (ref 1–4.8)
LYMPHOCYTES NFR BLD: 25.1 % (ref 18–48)
MAGNESIUM SERPL-MCNC: 1.5 MG/DL (ref 1.6–2.6)
MCH RBC QN AUTO: 31.2 PG (ref 27–31)
MCHC RBC AUTO-ENTMCNC: 33.7 G/DL (ref 32–36)
MCV RBC AUTO: 93 FL (ref 82–98)
MONOCYTES # BLD AUTO: 1.2 K/UL (ref 0.3–1)
MONOCYTES NFR BLD: 12.6 % (ref 4–15)
NEUTROPHILS # BLD AUTO: 5.6 K/UL (ref 1.8–7.7)
NEUTROPHILS NFR BLD: 60.3 % (ref 38–73)
NRBC BLD-RTO: 0 /100 WBC
PHOSPHATE SERPL-MCNC: 2.9 MG/DL (ref 2.7–4.5)
PLATELET # BLD AUTO: 374 K/UL (ref 150–450)
PMV BLD AUTO: 9.1 FL (ref 9.2–12.9)
POCT GLUCOSE: 228 MG/DL (ref 70–110)
POCT GLUCOSE: 290 MG/DL (ref 70–110)
POCT GLUCOSE: 294 MG/DL (ref 70–110)
POTASSIUM SERPL-SCNC: 4.1 MMOL/L (ref 3.5–5.1)
PROT SERPL-MCNC: 6.2 G/DL (ref 6–8.4)
RBC # BLD AUTO: 4.43 M/UL (ref 4.6–6.2)
SODIUM SERPL-SCNC: 136 MMOL/L (ref 136–145)
WBC # BLD AUTO: 9.21 K/UL (ref 3.9–12.7)

## 2021-08-23 PROCEDURE — 99900035 HC TECH TIME PER 15 MIN (STAT)

## 2021-08-23 PROCEDURE — 80053 COMPREHEN METABOLIC PANEL: CPT | Performed by: NURSE PRACTITIONER

## 2021-08-23 PROCEDURE — 27000221 HC OXYGEN, UP TO 24 HOURS

## 2021-08-23 PROCEDURE — 25000003 PHARM REV CODE 250: Performed by: HOSPITALIST

## 2021-08-23 PROCEDURE — 25000242 PHARM REV CODE 250 ALT 637 W/ HCPCS: Performed by: INTERNAL MEDICINE

## 2021-08-23 PROCEDURE — 94761 N-INVAS EAR/PLS OXIMETRY MLT: CPT

## 2021-08-23 PROCEDURE — 0001A HC IMMUNIZ ADMIN, SARS-COV-2 COVID-19 VACC, 30MCG/0.3ML, 1ST DOSE: CPT | Performed by: HOSPITALIST

## 2021-08-23 PROCEDURE — 94640 AIRWAY INHALATION TREATMENT: CPT

## 2021-08-23 PROCEDURE — 82728 ASSAY OF FERRITIN: CPT | Performed by: HOSPITALIST

## 2021-08-23 PROCEDURE — 86140 C-REACTIVE PROTEIN: CPT | Performed by: HOSPITALIST

## 2021-08-23 PROCEDURE — 83735 ASSAY OF MAGNESIUM: CPT | Performed by: NURSE PRACTITIONER

## 2021-08-23 PROCEDURE — 85025 COMPLETE CBC W/AUTO DIFF WBC: CPT | Performed by: NURSE PRACTITIONER

## 2021-08-23 PROCEDURE — 91300 PHARM REV CODE 636 W HCPCS: CPT | Performed by: HOSPITALIST

## 2021-08-23 PROCEDURE — 63600175 PHARM REV CODE 636 W HCPCS: Performed by: HOSPITALIST

## 2021-08-23 PROCEDURE — 36415 COLL VENOUS BLD VENIPUNCTURE: CPT | Performed by: NURSE PRACTITIONER

## 2021-08-23 PROCEDURE — 85379 FIBRIN DEGRADATION QUANT: CPT | Performed by: NURSE PRACTITIONER

## 2021-08-23 PROCEDURE — 94799 UNLISTED PULMONARY SVC/PX: CPT

## 2021-08-23 PROCEDURE — 84100 ASSAY OF PHOSPHORUS: CPT | Performed by: NURSE PRACTITIONER

## 2021-08-23 PROCEDURE — 83615 LACTATE (LD) (LDH) ENZYME: CPT | Performed by: HOSPITALIST

## 2021-08-23 PROCEDURE — 63600175 PHARM REV CODE 636 W HCPCS: Performed by: INTERNAL MEDICINE

## 2021-08-23 PROCEDURE — 25000003 PHARM REV CODE 250: Performed by: NURSE PRACTITIONER

## 2021-08-23 RX ORDER — CARVEDILOL 12.5 MG/1
12.5 TABLET ORAL 2 TIMES DAILY
Qty: 60 TABLET | Refills: 11 | Status: SHIPPED | OUTPATIENT
Start: 2021-08-23 | End: 2022-02-07 | Stop reason: SDUPTHER

## 2021-08-23 RX ORDER — CARVEDILOL 6.25 MG/1
6.25 TABLET ORAL ONCE
Status: DISCONTINUED | OUTPATIENT
Start: 2021-08-23 | End: 2021-08-23 | Stop reason: HOSPADM

## 2021-08-23 RX ORDER — LISINOPRIL 40 MG/1
40 TABLET ORAL DAILY
Qty: 90 TABLET | Refills: 3 | Status: SHIPPED | OUTPATIENT
Start: 2021-08-24 | End: 2022-03-08 | Stop reason: SDUPTHER

## 2021-08-23 RX ORDER — CARVEDILOL 6.25 MG/1
12.5 TABLET ORAL 2 TIMES DAILY
Status: DISCONTINUED | OUTPATIENT
Start: 2021-08-23 | End: 2021-08-23 | Stop reason: HOSPADM

## 2021-08-23 RX ORDER — ALBUTEROL SULFATE 90 UG/1
2 AEROSOL, METERED RESPIRATORY (INHALATION) EVERY 6 HOURS
Qty: 18 G | Refills: 0 | Status: SHIPPED | OUTPATIENT
Start: 2021-08-23 | End: 2022-02-17 | Stop reason: ALTCHOICE

## 2021-08-23 RX ORDER — DEXAMETHASONE 6 MG/1
6 TABLET ORAL DAILY
Qty: 5 TABLET | Refills: 0 | Status: SHIPPED | OUTPATIENT
Start: 2021-08-24 | End: 2021-08-29

## 2021-08-23 RX ADMIN — INSULIN ASPART 6 UNITS: 100 INJECTION, SOLUTION INTRAVENOUS; SUBCUTANEOUS at 05:08

## 2021-08-23 RX ADMIN — ALBUTEROL SULFATE 2 PUFF: 90 AEROSOL, METERED RESPIRATORY (INHALATION) at 07:08

## 2021-08-23 RX ADMIN — REMDESIVIR 100 MG: 100 INJECTION, POWDER, LYOPHILIZED, FOR SOLUTION INTRAVENOUS at 08:08

## 2021-08-23 RX ADMIN — OXYCODONE HYDROCHLORIDE AND ACETAMINOPHEN 500 MG: 500 TABLET ORAL at 08:08

## 2021-08-23 RX ADMIN — DOXYCYCLINE HYCLATE 100 MG: 100 TABLET, COATED ORAL at 08:08

## 2021-08-23 RX ADMIN — MYCOPHENOLATE MOFETIL 1250 MG: 250 CAPSULE ORAL at 08:08

## 2021-08-23 RX ADMIN — LISINOPRIL 40 MG: 40 TABLET ORAL at 08:08

## 2021-08-23 RX ADMIN — CARVEDILOL 6.25 MG: 6.25 TABLET, FILM COATED ORAL at 08:08

## 2021-08-23 RX ADMIN — ASPIRIN 81 MG: 81 TABLET, COATED ORAL at 08:08

## 2021-08-23 RX ADMIN — DEXAMETHASONE 6 MG: 2 TABLET ORAL at 08:08

## 2021-08-23 RX ADMIN — INSULIN ASPART 18 UNITS: 100 INJECTION, SOLUTION INTRAVENOUS; SUBCUTANEOUS at 08:08

## 2021-08-23 RX ADMIN — ALBUTEROL SULFATE 2 PUFF: 90 AEROSOL, METERED RESPIRATORY (INHALATION) at 12:08

## 2021-08-23 RX ADMIN — RNA INGREDIENT BNT-162B2 0.3 ML: 0.23 INJECTION, SUSPENSION INTRAMUSCULAR at 01:08

## 2021-08-23 RX ADMIN — THERA TABS 1 TABLET: TAB at 08:08

## 2021-08-24 ENCOUNTER — TELEPHONE (OUTPATIENT)
Dept: MEDSURG UNIT | Facility: HOSPITAL | Age: 76
End: 2021-08-24

## 2021-08-24 ENCOUNTER — TELEPHONE (OUTPATIENT)
Dept: ADMINISTRATIVE | Facility: OTHER | Age: 76
End: 2021-08-24

## 2021-08-25 ENCOUNTER — OFFICE VISIT (OUTPATIENT)
Dept: FAMILY MEDICINE | Facility: CLINIC | Age: 76
End: 2021-08-25
Payer: MEDICARE

## 2021-08-25 VITALS
TEMPERATURE: 97 F | SYSTOLIC BLOOD PRESSURE: 138 MMHG | WEIGHT: 220 LBS | OXYGEN SATURATION: 95 % | HEIGHT: 70 IN | BODY MASS INDEX: 31.5 KG/M2 | DIASTOLIC BLOOD PRESSURE: 71 MMHG | HEART RATE: 70 BPM

## 2021-08-25 DIAGNOSIS — Z79.4 TYPE 2 DIABETES MELLITUS WITH DIABETIC NEUROPATHY, WITH LONG-TERM CURRENT USE OF INSULIN: ICD-10-CM

## 2021-08-25 DIAGNOSIS — E11.40 TYPE 2 DIABETES MELLITUS WITH DIABETIC NEUROPATHY, WITH LONG-TERM CURRENT USE OF INSULIN: ICD-10-CM

## 2021-08-25 DIAGNOSIS — E66.01 MORBID OBESITY: ICD-10-CM

## 2021-08-25 DIAGNOSIS — I10 HYPERTENSION, ESSENTIAL: Primary | ICD-10-CM

## 2021-08-25 DIAGNOSIS — G70.00 MYASTHENIA GRAVIS: ICD-10-CM

## 2021-08-25 DIAGNOSIS — J12.82 PNEUMONIA DUE TO COVID-19 VIRUS: ICD-10-CM

## 2021-08-25 DIAGNOSIS — U07.1 PNEUMONIA DUE TO COVID-19 VIRUS: ICD-10-CM

## 2021-08-25 LAB
BACTERIA SPEC AEROBE CULT: ABNORMAL
BACTERIA SPEC AEROBE CULT: ABNORMAL
GRAM STN SPEC: ABNORMAL

## 2021-08-25 PROCEDURE — 1126F PR PAIN SEVERITY QUANTIFIED, NO PAIN PRESENT: ICD-10-PCS | Mod: CPTII,S$GLB,, | Performed by: FAMILY MEDICINE

## 2021-08-25 PROCEDURE — 99499 RISK ADDL DX/OHS AUDIT: ICD-10-PCS | Mod: S$GLB,,, | Performed by: FAMILY MEDICINE

## 2021-08-25 PROCEDURE — 99214 PR OFFICE/OUTPT VISIT, EST, LEVL IV, 30-39 MIN: ICD-10-PCS | Mod: S$GLB,,, | Performed by: FAMILY MEDICINE

## 2021-08-25 PROCEDURE — 1126F AMNT PAIN NOTED NONE PRSNT: CPT | Mod: CPTII,S$GLB,, | Performed by: FAMILY MEDICINE

## 2021-08-25 PROCEDURE — 1101F PT FALLS ASSESS-DOCD LE1/YR: CPT | Mod: CPTII,S$GLB,, | Performed by: FAMILY MEDICINE

## 2021-08-25 PROCEDURE — 3288F FALL RISK ASSESSMENT DOCD: CPT | Mod: CPTII,S$GLB,, | Performed by: FAMILY MEDICINE

## 2021-08-25 PROCEDURE — 99499 UNLISTED E&M SERVICE: CPT | Mod: S$GLB,,, | Performed by: FAMILY MEDICINE

## 2021-08-25 PROCEDURE — 3075F PR MOST RECENT SYSTOLIC BLOOD PRESS GE 130-139MM HG: ICD-10-PCS | Mod: CPTII,S$GLB,, | Performed by: FAMILY MEDICINE

## 2021-08-25 PROCEDURE — 3078F DIAST BP <80 MM HG: CPT | Mod: CPTII,S$GLB,, | Performed by: FAMILY MEDICINE

## 2021-08-25 PROCEDURE — 1159F PR MEDICATION LIST DOCUMENTED IN MEDICAL RECORD: ICD-10-PCS | Mod: CPTII,S$GLB,, | Performed by: FAMILY MEDICINE

## 2021-08-25 PROCEDURE — 3075F SYST BP GE 130 - 139MM HG: CPT | Mod: CPTII,S$GLB,, | Performed by: FAMILY MEDICINE

## 2021-08-25 PROCEDURE — 1111F DSCHRG MED/CURRENT MED MERGE: CPT | Mod: CPTII,S$GLB,, | Performed by: FAMILY MEDICINE

## 2021-08-25 PROCEDURE — 1111F PR DISCHARGE MEDS RECONCILED W/ CURRENT OUTPATIENT MED LIST: ICD-10-PCS | Mod: CPTII,S$GLB,, | Performed by: FAMILY MEDICINE

## 2021-08-25 PROCEDURE — 1101F PR PT FALLS ASSESS DOC 0-1 FALLS W/OUT INJ PAST YR: ICD-10-PCS | Mod: CPTII,S$GLB,, | Performed by: FAMILY MEDICINE

## 2021-08-25 PROCEDURE — 3288F PR FALLS RISK ASSESSMENT DOCUMENTED: ICD-10-PCS | Mod: CPTII,S$GLB,, | Performed by: FAMILY MEDICINE

## 2021-08-25 PROCEDURE — 99214 OFFICE O/P EST MOD 30 MIN: CPT | Mod: S$GLB,,, | Performed by: FAMILY MEDICINE

## 2021-08-25 PROCEDURE — 1159F MED LIST DOCD IN RCRD: CPT | Mod: CPTII,S$GLB,, | Performed by: FAMILY MEDICINE

## 2021-08-25 PROCEDURE — 3078F PR MOST RECENT DIASTOLIC BLOOD PRESSURE < 80 MM HG: ICD-10-PCS | Mod: CPTII,S$GLB,, | Performed by: FAMILY MEDICINE

## 2021-09-13 ENCOUNTER — IMMUNIZATION (OUTPATIENT)
Dept: PRIMARY CARE CLINIC | Facility: CLINIC | Age: 76
End: 2021-09-13
Payer: MEDICARE

## 2021-09-13 DIAGNOSIS — Z23 NEED FOR VACCINATION: Primary | ICD-10-CM

## 2021-09-13 PROCEDURE — 91300 COVID-19, MRNA, LNP-S, PF, 30 MCG/0.3 ML DOSE VACCINE: ICD-10-PCS | Mod: S$GLB,,, | Performed by: FAMILY MEDICINE

## 2021-09-13 PROCEDURE — 91300 COVID-19, MRNA, LNP-S, PF, 30 MCG/0.3 ML DOSE VACCINE: CPT | Mod: S$GLB,,, | Performed by: FAMILY MEDICINE

## 2021-09-13 PROCEDURE — 0002A COVID-19, MRNA, LNP-S, PF, 30 MCG/0.3 ML DOSE VACCINE: CPT | Mod: CV19,S$GLB,, | Performed by: FAMILY MEDICINE

## 2021-09-13 PROCEDURE — 0002A COVID-19, MRNA, LNP-S, PF, 30 MCG/0.3 ML DOSE VACCINE: ICD-10-PCS | Mod: CV19,S$GLB,, | Performed by: FAMILY MEDICINE

## 2022-02-07 ENCOUNTER — OFFICE VISIT (OUTPATIENT)
Dept: FAMILY MEDICINE | Facility: CLINIC | Age: 77
End: 2022-02-07
Payer: MEDICARE

## 2022-02-07 VITALS
WEIGHT: 221 LBS | HEIGHT: 70 IN | HEART RATE: 75 BPM | SYSTOLIC BLOOD PRESSURE: 150 MMHG | BODY MASS INDEX: 31.64 KG/M2 | DIASTOLIC BLOOD PRESSURE: 80 MMHG | TEMPERATURE: 99 F | OXYGEN SATURATION: 95 %

## 2022-02-07 DIAGNOSIS — G70.00 MYASTHENIA GRAVIS: ICD-10-CM

## 2022-02-07 DIAGNOSIS — Z79.4 TYPE 2 DIABETES MELLITUS WITHOUT COMPLICATION, WITH LONG-TERM CURRENT USE OF INSULIN: ICD-10-CM

## 2022-02-07 DIAGNOSIS — E53.8 B12 DEFICIENCY: ICD-10-CM

## 2022-02-07 DIAGNOSIS — I10 HYPERTENSION, ESSENTIAL: ICD-10-CM

## 2022-02-07 DIAGNOSIS — Z79.82 ASPIRIN LONG-TERM USE: ICD-10-CM

## 2022-02-07 DIAGNOSIS — Z12.5 PROSTATE CANCER SCREENING: ICD-10-CM

## 2022-02-07 DIAGNOSIS — Z86.16 HISTORY OF COVID-19: ICD-10-CM

## 2022-02-07 DIAGNOSIS — R10.9 RIGHT FLANK PAIN: Primary | ICD-10-CM

## 2022-02-07 DIAGNOSIS — E78.5 HYPERLIPIDEMIA, UNSPECIFIED HYPERLIPIDEMIA TYPE: ICD-10-CM

## 2022-02-07 DIAGNOSIS — E11.9 TYPE 2 DIABETES MELLITUS WITHOUT COMPLICATION, WITH LONG-TERM CURRENT USE OF INSULIN: ICD-10-CM

## 2022-02-07 DIAGNOSIS — Z11.59 NEED FOR HEPATITIS C SCREENING TEST: ICD-10-CM

## 2022-02-07 LAB
BILIRUB SERPL-MCNC: ABNORMAL MG/DL
BLOOD URINE, POC: ABNORMAL
COLOR, POC UA: YELLOW
GLUCOSE UR QL STRIP: 1000
KETONES UR QL STRIP: ABNORMAL
LEUKOCYTE ESTERASE URINE, POC: ABNORMAL
NITRITE, POC UA: ABNORMAL
PH, POC UA: 6
PROTEIN, POC: ABNORMAL
SPECIFIC GRAVITY, POC UA: 1.01
UROBILINOGEN, POC UA: NORMAL

## 2022-02-07 PROCEDURE — 1101F PR PT FALLS ASSESS DOC 0-1 FALLS W/OUT INJ PAST YR: ICD-10-PCS | Mod: CPTII,S$GLB,, | Performed by: FAMILY MEDICINE

## 2022-02-07 PROCEDURE — 3077F SYST BP >= 140 MM HG: CPT | Mod: CPTII,S$GLB,, | Performed by: FAMILY MEDICINE

## 2022-02-07 PROCEDURE — 3077F PR MOST RECENT SYSTOLIC BLOOD PRESSURE >= 140 MM HG: ICD-10-PCS | Mod: CPTII,S$GLB,, | Performed by: FAMILY MEDICINE

## 2022-02-07 PROCEDURE — 3079F DIAST BP 80-89 MM HG: CPT | Mod: CPTII,S$GLB,, | Performed by: FAMILY MEDICINE

## 2022-02-07 PROCEDURE — 81003 URINALYSIS AUTO W/O SCOPE: CPT | Mod: QW,S$GLB,, | Performed by: FAMILY MEDICINE

## 2022-02-07 PROCEDURE — 1126F PR PAIN SEVERITY QUANTIFIED, NO PAIN PRESENT: ICD-10-PCS | Mod: CPTII,S$GLB,, | Performed by: FAMILY MEDICINE

## 2022-02-07 PROCEDURE — 1126F AMNT PAIN NOTED NONE PRSNT: CPT | Mod: CPTII,S$GLB,, | Performed by: FAMILY MEDICINE

## 2022-02-07 PROCEDURE — 81003 POCT URINALYSIS W/O SCOPE: ICD-10-PCS | Mod: QW,S$GLB,, | Performed by: FAMILY MEDICINE

## 2022-02-07 PROCEDURE — 99214 OFFICE O/P EST MOD 30 MIN: CPT | Mod: S$GLB,,, | Performed by: FAMILY MEDICINE

## 2022-02-07 PROCEDURE — 1101F PT FALLS ASSESS-DOCD LE1/YR: CPT | Mod: CPTII,S$GLB,, | Performed by: FAMILY MEDICINE

## 2022-02-07 PROCEDURE — 1159F PR MEDICATION LIST DOCUMENTED IN MEDICAL RECORD: ICD-10-PCS | Mod: CPTII,S$GLB,, | Performed by: FAMILY MEDICINE

## 2022-02-07 PROCEDURE — 3288F FALL RISK ASSESSMENT DOCD: CPT | Mod: CPTII,S$GLB,, | Performed by: FAMILY MEDICINE

## 2022-02-07 PROCEDURE — 1159F MED LIST DOCD IN RCRD: CPT | Mod: CPTII,S$GLB,, | Performed by: FAMILY MEDICINE

## 2022-02-07 PROCEDURE — 3079F PR MOST RECENT DIASTOLIC BLOOD PRESSURE 80-89 MM HG: ICD-10-PCS | Mod: CPTII,S$GLB,, | Performed by: FAMILY MEDICINE

## 2022-02-07 PROCEDURE — 99214 PR OFFICE/OUTPT VISIT, EST, LEVL IV, 30-39 MIN: ICD-10-PCS | Mod: S$GLB,,, | Performed by: FAMILY MEDICINE

## 2022-02-07 PROCEDURE — 3288F PR FALLS RISK ASSESSMENT DOCUMENTED: ICD-10-PCS | Mod: CPTII,S$GLB,, | Performed by: FAMILY MEDICINE

## 2022-02-07 RX ORDER — AMLODIPINE BESYLATE 5 MG/1
TABLET ORAL
COMMUNITY
Start: 2021-03-04 | End: 2022-02-07 | Stop reason: SDUPTHER

## 2022-02-07 RX ORDER — CARVEDILOL 12.5 MG/1
12.5 TABLET ORAL 2 TIMES DAILY
Qty: 180 TABLET | Refills: 1 | Status: SHIPPED | OUTPATIENT
Start: 2022-02-07 | End: 2022-03-08 | Stop reason: SDUPTHER

## 2022-02-07 RX ORDER — SIMVASTATIN 20 MG/1
20 TABLET, FILM COATED ORAL NIGHTLY
Qty: 90 TABLET | Refills: 1 | Status: SHIPPED | OUTPATIENT
Start: 2022-02-07 | End: 2022-03-08 | Stop reason: SDUPTHER

## 2022-02-07 RX ORDER — PYRIDOSTIGMINE BROMIDE 60 MG/1
TABLET ORAL
COMMUNITY
Start: 2021-02-18 | End: 2022-02-17 | Stop reason: ALTCHOICE

## 2022-02-07 RX ORDER — AMLODIPINE BESYLATE 5 MG/1
5 TABLET ORAL DAILY
Qty: 30 TABLET | Refills: 0 | Status: SHIPPED | OUTPATIENT
Start: 2022-02-07 | End: 2022-03-04

## 2022-02-07 RX ORDER — BENAZEPRIL HYDROCHLORIDE 20 MG/1
TABLET ORAL
COMMUNITY
Start: 2021-03-04 | End: 2022-04-12

## 2022-02-08 NOTE — PROGRESS NOTES
Right greater than left flank pain when standing.  No injury.  Hurts only upon standing.  Once he is up or down the pain goes away.  No fever chills nausea vomiting or diarrhea.  No history of kidney stones.  Same pain before around 20 years ago.  Also follow-up hyperlipidemia due.  Myasthenia gravis.  Hypertension quite elevated today.  Uses aspirin regularly.  Diabetes mellitus type 2 on insulin using the 70 30 insulin.  Sugars out of control.  B12 deficiency on medication.    Urinalysis trace blood.  Trace protein negative leukocytes.    Physical examination vital signs noted.  Neck without bruit.  Chest clear.  Heart regular rate rhythm.  Abdomen bowel sounds are positive soft nontender.  Extremities without edema.  Right CVA pain is increased slightly with rotation.    Impression.  Right flank pain.  Hyperlipidemia.  Myasthenia gravis.  Hypertension uncontrolled.  Aspirin use.  Diabetes mellitus type 2 on insulin.  B12 deficiency.    Plan urine culture and sensitivity.  Go for eye exam.  Microalbumin ordered.  Pneumovax 23 patient says he had 1. Flu shot he also says he had the flu shot.  Add Norvasc 5 mg daily 30 pills.  Blood pressure follow-up 2-3 weeks.  CBC CMP lipids A1c PSA ordered.  CT abdomen pelvis without contrast to look for renal stone.

## 2022-02-09 ENCOUNTER — HOSPITAL ENCOUNTER (OUTPATIENT)
Dept: RADIOLOGY | Facility: HOSPITAL | Age: 77
Discharge: HOME | End: 2022-02-09
Attending: FAMILY MEDICINE
Payer: MEDICARE

## 2022-02-09 DIAGNOSIS — R10.9 RIGHT FLANK PAIN: ICD-10-CM

## 2022-02-09 LAB
ALBUMIN/CREAT UR: 7 MCG/MG CREAT
BACTERIA UR CULT: NORMAL
CREAT UR-MCNC: 85 MG/DL (ref 20–320)
MICROALBUMIN UR-MCNC: 0.6 MG/DL
SERVICE CMNT-IMP: NORMAL

## 2022-02-09 PROCEDURE — 74176 CT ABD & PELVIS W/O CONTRAST: CPT | Mod: TC,PO

## 2022-02-10 ENCOUNTER — PATIENT MESSAGE (OUTPATIENT)
Dept: FAMILY MEDICINE | Facility: CLINIC | Age: 77
End: 2022-02-10
Payer: MEDICARE

## 2022-02-10 ENCOUNTER — TELEPHONE (OUTPATIENT)
Dept: FAMILY MEDICINE | Facility: CLINIC | Age: 77
End: 2022-02-10
Payer: MEDICARE

## 2022-02-10 NOTE — TELEPHONE ENCOUNTER
----- Message from Kit Aguero sent at 2/10/2022 10:37 AM CST -----  Contact: Self  Type:  Test Results    Who Called:  Patient  Name of Test (Lab/Mammo/Etc):  CT  Date of Test:  2/9  Ordering Provider:  Parish  Where the test was performed:  [facility]  Best Call Back Number:  [phone]   Additional Information:

## 2022-02-11 DIAGNOSIS — K80.20 MULTIPLE GALLSTONES: Primary | ICD-10-CM

## 2022-02-11 PROBLEM — R05.3 CHRONIC COUGH: Status: RESOLVED | Noted: 2020-01-27 | Resolved: 2022-02-11

## 2022-02-11 PROBLEM — H93.13 TINNITUS OF BOTH EARS: Status: RESOLVED | Noted: 2020-02-05 | Resolved: 2022-02-11

## 2022-02-11 PROBLEM — J96.01 ACUTE HYPOXEMIC RESPIRATORY FAILURE: Status: RESOLVED | Noted: 2021-08-19 | Resolved: 2022-02-11

## 2022-02-11 PROBLEM — R42 DIZZINESS: Status: RESOLVED | Noted: 2020-02-05 | Resolved: 2022-02-11

## 2022-02-11 PROBLEM — E66.811 CLASS 1 OBESITY IN ADULT: Status: RESOLVED | Noted: 2021-01-31 | Resolved: 2022-02-11

## 2022-02-11 PROBLEM — J42 CHRONIC BRONCHITIS: Status: RESOLVED | Noted: 2021-08-19 | Resolved: 2022-02-11

## 2022-02-11 PROBLEM — E11.9 DIABETES MELLITUS: Status: RESOLVED | Noted: 2021-02-01 | Resolved: 2022-02-11

## 2022-02-11 PROBLEM — Z86.16 HISTORY OF COVID-19: Status: ACTIVE | Noted: 2022-02-11

## 2022-02-11 PROBLEM — J31.0 CHRONIC RHINITIS: Status: RESOLVED | Noted: 2020-01-27 | Resolved: 2022-02-11

## 2022-02-11 PROBLEM — U07.1 COVID-19 VIRUS INFECTION: Status: RESOLVED | Noted: 2021-08-19 | Resolved: 2022-02-11

## 2022-02-11 PROBLEM — E66.9 CLASS 1 OBESITY IN ADULT: Status: RESOLVED | Noted: 2021-01-31 | Resolved: 2022-02-11

## 2022-02-17 ENCOUNTER — OFFICE VISIT (OUTPATIENT)
Dept: SURGERY | Facility: CLINIC | Age: 77
End: 2022-02-17
Payer: MEDICARE

## 2022-02-17 VITALS
WEIGHT: 220 LBS | TEMPERATURE: 98 F | HEIGHT: 70 IN | HEART RATE: 64 BPM | BODY MASS INDEX: 31.5 KG/M2 | SYSTOLIC BLOOD PRESSURE: 134 MMHG | DIASTOLIC BLOOD PRESSURE: 72 MMHG

## 2022-02-17 DIAGNOSIS — K80.20 CALCULUS OF GALLBLADDER WITHOUT CHOLECYSTITIS WITHOUT OBSTRUCTION: Primary | ICD-10-CM

## 2022-02-17 DIAGNOSIS — K80.20 MULTIPLE GALLSTONES: ICD-10-CM

## 2022-02-17 PROCEDURE — 3078F DIAST BP <80 MM HG: CPT | Mod: CPTII,S$GLB,, | Performed by: PHYSICIAN ASSISTANT

## 2022-02-17 PROCEDURE — 99203 OFFICE O/P NEW LOW 30 MIN: CPT | Mod: S$GLB,,, | Performed by: PHYSICIAN ASSISTANT

## 2022-02-17 PROCEDURE — 1125F PR PAIN SEVERITY QUANTIFIED, PAIN PRESENT: ICD-10-PCS | Mod: CPTII,S$GLB,, | Performed by: PHYSICIAN ASSISTANT

## 2022-02-17 PROCEDURE — 99203 PR OFFICE/OUTPT VISIT, NEW, LEVL III, 30-44 MIN: ICD-10-PCS | Mod: S$GLB,,, | Performed by: PHYSICIAN ASSISTANT

## 2022-02-17 PROCEDURE — 3078F PR MOST RECENT DIASTOLIC BLOOD PRESSURE < 80 MM HG: ICD-10-PCS | Mod: CPTII,S$GLB,, | Performed by: PHYSICIAN ASSISTANT

## 2022-02-17 PROCEDURE — 3075F PR MOST RECENT SYSTOLIC BLOOD PRESS GE 130-139MM HG: ICD-10-PCS | Mod: CPTII,S$GLB,, | Performed by: PHYSICIAN ASSISTANT

## 2022-02-17 PROCEDURE — 1125F AMNT PAIN NOTED PAIN PRSNT: CPT | Mod: CPTII,S$GLB,, | Performed by: PHYSICIAN ASSISTANT

## 2022-02-17 PROCEDURE — 3075F SYST BP GE 130 - 139MM HG: CPT | Mod: CPTII,S$GLB,, | Performed by: PHYSICIAN ASSISTANT

## 2022-02-17 NOTE — PROGRESS NOTES
History & Physical    SUBJECTIVE:     History of Present Illness:  Patient is a 76 y.o. male presents with cholelithiasis found incidentally during a workup for right flank pain. He complains of right flank pain which extends across to his left flank. The pain when going from seated to standing position. It is severe and lasts 1-3 seconds, then resolves completely. The pain occurs not every time he stands, but often. He denies pain occurring in any other position. He denies any post prandial symptoms, shortness of breath, nausea, vomiting, fever, chills. He denies back injury, recent falls, etc. He did have one episode of darker urine which resolved. Sx began about 2 months ago. He began taking a new supplement meant to help with gallstones which seems to have improved his symptoms over the last week.       Chief Complaint   Patient presents with    Consult     Gallbladder       Review of patient's allergies indicates:   Allergen Reactions    Levaquin [levofloxacin]        Current Outpatient Medications   Medication Sig Dispense Refill    amLODIPine (NORVASC) 5 MG tablet Take 1 tablet (5 mg total) by mouth once daily. 30 tablet 0    benazepriL (LOTENSIN) 20 MG tablet 1 tablet      carvediloL (COREG) 12.5 MG tablet Take 1 tablet (12.5 mg total) by mouth 2 (two) times daily. 180 tablet 1    fluticasone propionate (FLONASE) 50 mcg/actuation nasal spray 2 sprays (100 mcg total) by Each Nostril route once daily. 16 g 5    insulin NPH-insulin regular, 70/30, (NOVOLIN 70/30) 100 unit/mL (70-30) injection Inject 50 Units into the skin 2 (two) times daily. 1 vial 11    lisinopriL (PRINIVIL,ZESTRIL) 40 MG tablet Take 1 tablet (40 mg total) by mouth once daily. 90 tablet 3    mycophenolate (CELLCEPT) 500 mg Tab Take 1,250 mg by mouth 2 (two) times daily. 2.5 tablets twice daily      simvastatin (ZOCOR) 20 MG tablet Take 1 tablet (20 mg total) by mouth every evening. 90 tablet 1    aspirin (ECOTRIN) 81 MG EC tablet  "Take 1 tablet (81 mg total) by mouth once daily. 90 tablet 1     Current Facility-Administered Medications   Medication Dose Route Frequency Provider Last Rate Last Admin    methylPREDNISolone sodium succinate injection 40 mg  40 mg Intravenous Once PRN Jacques Cali MD           Past Medical History:   Diagnosis Date    Diabetes mellitus, type 2     Hyperlipidemia     Hypertension      Past Surgical History:   Procedure Laterality Date    EYE SURGERY       Family History   Problem Relation Age of Onset    No Known Problems Mother     Diabetes Father     Hypertension Father      Social History     Tobacco Use    Smoking status: Never Smoker    Smokeless tobacco: Never Used   Substance Use Topics    Alcohol use: Never    Drug use: Never        Review of Systems:  Review of Systems   Constitutional: Negative for activity change, appetite change, chills, diaphoresis, fever and unexpected weight change.   Respiratory: Negative for shortness of breath.    Cardiovascular: Negative for chest pain.   Gastrointestinal: Negative for abdominal distention, abdominal pain, constipation, diarrhea, nausea and vomiting.   Genitourinary: Negative for dysuria.   Musculoskeletal: Positive for back pain. Negative for myalgias.   Skin: Negative for color change and wound.   Neurological: Negative for weakness.   Hematological: Does not bruise/bleed easily.   Psychiatric/Behavioral: The patient is not nervous/anxious.        OBJECTIVE:     Vital Signs (Most Recent)  Temp: 98 °F (36.7 °C) (02/17/22 0907)  Pulse: 64 (02/17/22 0907)  BP: 134/72 (02/17/22 0907)  5' 10" (1.778 m)  99.8 kg (220 lb)     Physical Exam:  Physical Exam  Vitals reviewed.   Constitutional:       Appearance: Normal appearance. He is well-developed and well-nourished. He is not ill-appearing.   HENT:      Head: Normocephalic and atraumatic.   Eyes:      Extraocular Movements: EOM normal.   Cardiovascular:      Rate and Rhythm: Normal rate. "   Pulmonary:      Effort: Pulmonary effort is normal. No respiratory distress.   Abdominal:      General: There is no distension.      Palpations: Abdomen is soft. There is no mass.      Tenderness: There is no abdominal tenderness. There is no right CVA tenderness, left CVA tenderness, guarding or rebound.      Hernia: No hernia is present.   Musculoskeletal:         General: No swelling, tenderness or deformity. Normal range of motion.      Cervical back: Normal range of motion.   Skin:     General: Skin is warm and dry.   Neurological:      Mental Status: He is alert and oriented to person, place, and time.   Psychiatric:         Mood and Affect: Mood and affect normal.         Thought Content: Thought content normal.         Diagnostic Results:  CT: Reviewed    ASSESSMENT/PLAN:     Cholelithiasis - noted incidentally on CT. His complaints are not consistent with gallbladder symptoms and seem to be musculoskeletal in nature. I recommend that he consider physical therapy if his symptoms continue. He should follow up with his primary care provider regarding his back/flank pain.     Will obtain abdominal US to further evaluate the gallbladder.     Follow up as needed, especially if symptoms worsen or change.

## 2022-02-23 DIAGNOSIS — D84.9 IMMUNOSUPPRESSED STATUS: ICD-10-CM

## 2022-02-24 ENCOUNTER — PES CALL (OUTPATIENT)
Dept: ADMINISTRATIVE | Facility: CLINIC | Age: 77
End: 2022-02-24
Payer: MEDICARE

## 2022-03-01 NOTE — TELEPHONE ENCOUNTER
Care Due:                  Date            Visit Type   Department     Provider  --------------------------------------------------------------------------------                                SAME DAY -                              ESTABLISHED   [facility]  Last Visit: 02-      [facility]                              EP -                              PRIMARY      [facility]  Next Visit: 03-      CARE ([facility])   [facility]   [facility]                                                            Last  Test          Frequency    Reason                     Performed    Due Date  --------------------------------------------------------------------------------    Lipid Panel.  12 months..  simvastatin..............  01- 01-    Powered by [employer]. Reference number: [id number].   3/01/2022 7:48:33 AM CST

## 2022-03-03 LAB
ALBUMIN SERPL-MCNC: 4.1 G/DL (ref 3.6–5.1)
ALBUMIN/GLOB SERPL: 1.7 (CALC) (ref 1–2.5)
ALP SERPL-CCNC: 53 U/L (ref 35–144)
ALT SERPL-CCNC: 11 U/L (ref 9–46)
AST SERPL-CCNC: 11 U/L (ref 10–35)
BASOPHILS # BLD AUTO: 57 CELLS/UL (ref 0–200)
BASOPHILS NFR BLD AUTO: 0.7 %
BILIRUB SERPL-MCNC: 0.4 MG/DL (ref 0.2–1.2)
BUN SERPL-MCNC: 21 MG/DL (ref 7–25)
BUN/CREAT SERPL: ABNORMAL (CALC) (ref 6–22)
CALCIUM SERPL-MCNC: 9.2 MG/DL (ref 8.6–10.3)
CHLORIDE SERPL-SCNC: 101 MMOL/L (ref 98–110)
CHOLEST SERPL-MCNC: 149 MG/DL
CHOLEST/HDLC SERPL: 4.1 (CALC)
CO2 SERPL-SCNC: 32 MMOL/L (ref 20–32)
CREAT SERPL-MCNC: 0.87 MG/DL (ref 0.7–1.18)
EOSINOPHIL # BLD AUTO: 203 CELLS/UL (ref 15–500)
EOSINOPHIL NFR BLD AUTO: 2.5 %
ERYTHROCYTE [DISTWIDTH] IN BLOOD BY AUTOMATED COUNT: 12.5 % (ref 11–15)
GLOBULIN SER CALC-MCNC: 2.4 G/DL (CALC) (ref 1.9–3.7)
GLUCOSE SERPL-MCNC: 275 MG/DL (ref 65–99)
HBA1C MFR BLD: 9.6 % OF TOTAL HGB
HCT VFR BLD AUTO: 44 % (ref 38.5–50)
HCV AB S/CO SERPL IA: 0.01
HCV AB SERPL QL IA: NORMAL
HDLC SERPL-MCNC: 36 MG/DL
HGB BLD-MCNC: 14.8 G/DL (ref 13.2–17.1)
LDLC SERPL CALC-MCNC: 92 MG/DL (CALC)
LYMPHOCYTES # BLD AUTO: 2746 CELLS/UL (ref 850–3900)
LYMPHOCYTES NFR BLD AUTO: 33.9 %
MCH RBC QN AUTO: 31.9 PG (ref 27–33)
MCHC RBC AUTO-ENTMCNC: 33.6 G/DL (ref 32–36)
MCV RBC AUTO: 94.8 FL (ref 80–100)
MONOCYTES # BLD AUTO: 640 CELLS/UL (ref 200–950)
MONOCYTES NFR BLD AUTO: 7.9 %
NEUTROPHILS # BLD AUTO: 4455 CELLS/UL (ref 1500–7800)
NEUTROPHILS NFR BLD AUTO: 55 %
NONHDLC SERPL-MCNC: 113 MG/DL (CALC)
PLATELET # BLD AUTO: 184 THOUSAND/UL (ref 140–400)
PMV BLD REES-ECKER: 9.9 FL (ref 7.5–12.5)
POTASSIUM SERPL-SCNC: 4.6 MMOL/L (ref 3.5–5.3)
PROT SERPL-MCNC: 6.5 G/DL (ref 6.1–8.1)
PSA SERPL-MCNC: 1.79 NG/ML
RBC # BLD AUTO: 4.64 MILLION/UL (ref 4.2–5.8)
SODIUM SERPL-SCNC: 139 MMOL/L (ref 135–146)
TRIGL SERPL-MCNC: 118 MG/DL
WBC # BLD AUTO: 8.1 THOUSAND/UL (ref 3.8–10.8)

## 2022-03-04 RX ORDER — AMLODIPINE BESYLATE 5 MG/1
TABLET ORAL
Qty: 90 TABLET | Refills: 3 | Status: SHIPPED | OUTPATIENT
Start: 2022-03-04 | End: 2022-03-08 | Stop reason: SDUPTHER

## 2022-03-04 NOTE — TELEPHONE ENCOUNTER
Refill Authorization Note   Fazal Wade  is requesting a refill authorization.  Brief Assessment and Rationale for Refill:  Approve     Medication Therapy Plan:       Medication Reconciliation Completed: No   Comments:   --->Care Gap information included below if applicable.       Requested Prescriptions   Pending Prescriptions Disp Refills    amLODIPine (NORVASC) 5 MG tablet [Pharmacy Med Name: amLODIPine Besylate 5 MG Oral Tablet] 90 tablet 3     Sig: Take 1 tablet by mouth once daily       Cardiovascular:  Calcium Channel Blockers Passed - 3/4/2022 12:27 PM        Passed - Patient is at least 18 years old        Passed - Last BP in normal range within 360 days     BP Readings from Last 1 Encounters:   02/17/22 134/72               Passed - Valid encounter within last 15 months     Recent Visits  Date Type Provider Dept   02/07/22 Office Visit Sylvester Lugo III, MD [facility]   08/25/21 Office Visit Sylvester Lugo III, MD [facility]   06/22/21 Office Visit Sylvester Lugo III, MD [facility]   02/04/21 Office Visit Sylvester Lguo III, MD [facility]   11/05/20 Office Visit Sylvester Lugo III, MD [facility]   08/04/20 Office Visit Sylvester Lugo III, MD Retired Occ Dr. Sylvester Lugo III   Showing recent visits within past 720 days and meeting all other requirements  Future Appointments  No visits were found meeting these conditions.  Showing future appointments within next 150 days and meeting all other requirements      Future Appointments              In 4 days Sylvester Lugo III, MD [facility], [facility]                    Appointments  past 12m or future 3m with PCP    Date Provider   Last Visit   2/7/2022 Sylvester Lugo III, MD   Next Visit   3/8/2022 Sylvester Lugo III, MD   ED visits in past 90 days: 0     Note composed:12:28 PM 03/04/2022

## 2022-03-08 ENCOUNTER — OFFICE VISIT (OUTPATIENT)
Dept: FAMILY MEDICINE | Facility: CLINIC | Age: 77
End: 2022-03-08
Payer: MEDICARE

## 2022-03-08 DIAGNOSIS — Z79.4 TYPE 2 DIABETES MELLITUS WITHOUT COMPLICATION, WITH LONG-TERM CURRENT USE OF INSULIN: ICD-10-CM

## 2022-03-08 DIAGNOSIS — E11.9 TYPE 2 DIABETES MELLITUS WITHOUT COMPLICATION, WITH LONG-TERM CURRENT USE OF INSULIN: ICD-10-CM

## 2022-03-08 DIAGNOSIS — K80.80 BILIARY CALCULUS OF OTHER SITE WITHOUT OBSTRUCTION: ICD-10-CM

## 2022-03-08 DIAGNOSIS — G70.00 MYASTHENIA GRAVIS: ICD-10-CM

## 2022-03-08 DIAGNOSIS — M54.9 MID BACK PAIN ON RIGHT SIDE: ICD-10-CM

## 2022-03-08 DIAGNOSIS — I10 HYPERTENSION, ESSENTIAL: Primary | ICD-10-CM

## 2022-03-08 PROCEDURE — 1160F RVW MEDS BY RX/DR IN RCRD: CPT | Mod: CPTII,S$GLB,, | Performed by: FAMILY MEDICINE

## 2022-03-08 PROCEDURE — 1160F PR REVIEW ALL MEDS BY PRESCRIBER/CLIN PHARMACIST DOCUMENTED: ICD-10-PCS | Mod: CPTII,S$GLB,, | Performed by: FAMILY MEDICINE

## 2022-03-08 PROCEDURE — 3288F PR FALLS RISK ASSESSMENT DOCUMENTED: ICD-10-PCS | Mod: CPTII,S$GLB,, | Performed by: FAMILY MEDICINE

## 2022-03-08 PROCEDURE — 99214 PR OFFICE/OUTPT VISIT, EST, LEVL IV, 30-39 MIN: ICD-10-PCS | Mod: S$GLB,,, | Performed by: FAMILY MEDICINE

## 2022-03-08 PROCEDURE — 3079F DIAST BP 80-89 MM HG: CPT | Mod: CPTII,S$GLB,, | Performed by: FAMILY MEDICINE

## 2022-03-08 PROCEDURE — 3077F PR MOST RECENT SYSTOLIC BLOOD PRESSURE >= 140 MM HG: ICD-10-PCS | Mod: CPTII,S$GLB,, | Performed by: FAMILY MEDICINE

## 2022-03-08 PROCEDURE — 1101F PR PT FALLS ASSESS DOC 0-1 FALLS W/OUT INJ PAST YR: ICD-10-PCS | Mod: CPTII,S$GLB,, | Performed by: FAMILY MEDICINE

## 2022-03-08 PROCEDURE — 1126F AMNT PAIN NOTED NONE PRSNT: CPT | Mod: CPTII,S$GLB,, | Performed by: FAMILY MEDICINE

## 2022-03-08 PROCEDURE — 3288F FALL RISK ASSESSMENT DOCD: CPT | Mod: CPTII,S$GLB,, | Performed by: FAMILY MEDICINE

## 2022-03-08 PROCEDURE — 1126F PR PAIN SEVERITY QUANTIFIED, NO PAIN PRESENT: ICD-10-PCS | Mod: CPTII,S$GLB,, | Performed by: FAMILY MEDICINE

## 2022-03-08 PROCEDURE — 1101F PT FALLS ASSESS-DOCD LE1/YR: CPT | Mod: CPTII,S$GLB,, | Performed by: FAMILY MEDICINE

## 2022-03-08 PROCEDURE — 3079F PR MOST RECENT DIASTOLIC BLOOD PRESSURE 80-89 MM HG: ICD-10-PCS | Mod: CPTII,S$GLB,, | Performed by: FAMILY MEDICINE

## 2022-03-08 PROCEDURE — 99214 OFFICE O/P EST MOD 30 MIN: CPT | Mod: S$GLB,,, | Performed by: FAMILY MEDICINE

## 2022-03-08 PROCEDURE — 1159F MED LIST DOCD IN RCRD: CPT | Mod: CPTII,S$GLB,, | Performed by: FAMILY MEDICINE

## 2022-03-08 PROCEDURE — 3077F SYST BP >= 140 MM HG: CPT | Mod: CPTII,S$GLB,, | Performed by: FAMILY MEDICINE

## 2022-03-08 PROCEDURE — 1159F PR MEDICATION LIST DOCUMENTED IN MEDICAL RECORD: ICD-10-PCS | Mod: CPTII,S$GLB,, | Performed by: FAMILY MEDICINE

## 2022-03-08 RX ORDER — DAPAGLIFLOZIN 10 MG/1
10 TABLET, FILM COATED ORAL DAILY
Qty: 90 TABLET | Refills: 1 | Status: SHIPPED | OUTPATIENT
Start: 2022-03-08 | End: 2022-06-13 | Stop reason: SDUPTHER

## 2022-03-08 RX ORDER — AMLODIPINE BESYLATE 5 MG/1
5 TABLET ORAL DAILY
Qty: 90 TABLET | Refills: 1 | Status: SHIPPED | OUTPATIENT
Start: 2022-03-08 | End: 2022-06-13 | Stop reason: SDUPTHER

## 2022-03-08 RX ORDER — SIMVASTATIN 20 MG/1
20 TABLET, FILM COATED ORAL NIGHTLY
Qty: 90 TABLET | Refills: 1 | Status: SHIPPED | OUTPATIENT
Start: 2022-03-08 | End: 2022-06-13 | Stop reason: SDUPTHER

## 2022-03-08 RX ORDER — INSULIN HUMAN 100 [IU]/ML
INJECTION, SUSPENSION SUBCUTANEOUS
Status: CANCELLED | OUTPATIENT
Start: 2022-03-08 | End: 2023-03-08

## 2022-03-08 RX ORDER — LISINOPRIL 40 MG/1
40 TABLET ORAL DAILY
Qty: 90 TABLET | Refills: 1 | Status: SHIPPED | OUTPATIENT
Start: 2022-03-08 | End: 2022-06-13 | Stop reason: SDUPTHER

## 2022-03-08 RX ORDER — TIZANIDINE 4 MG/1
4 TABLET ORAL 2 TIMES DAILY PRN
Qty: 30 TABLET | Refills: 2 | Status: SHIPPED | OUTPATIENT
Start: 2022-03-08 | End: 2022-03-18

## 2022-03-08 RX ORDER — CARVEDILOL 12.5 MG/1
12.5 TABLET ORAL 2 TIMES DAILY
Qty: 180 TABLET | Refills: 1 | Status: SHIPPED | OUTPATIENT
Start: 2022-03-08 | End: 2022-06-13 | Stop reason: SDUPTHER

## 2022-03-09 VITALS
WEIGHT: 223 LBS | DIASTOLIC BLOOD PRESSURE: 84 MMHG | TEMPERATURE: 98 F | HEIGHT: 70 IN | BODY MASS INDEX: 31.92 KG/M2 | SYSTOLIC BLOOD PRESSURE: 150 MMHG

## 2022-03-10 NOTE — PROGRESS NOTES
Follow-up hypertension.  Added Norvasc but he stopped his lisinopril so blood pressure still elevated.  Cholelithiasis.  He did not do ultrasound we had ordered.  He saw surgical nurse practitioner and told him his symptoms did not sound like gallbladder so he did do it.  No nausea or vomiting now.  Asymptomatic.  Has some right mid back pain occurs upon standing.  For couple of months now.  He now remembers that he did have a fall about 3 months ago.  But the pain did not start for month after that fall.  Diabetes mellitus type 2 very poor control.  Using his 70 30 insulin due to cost.  Forty or 50 units twice a day.  Morning sugars are running anywhere from 150-250 evening sugars are similar.  Myasthenia gravis also.  Hepatitis-C negative PSA 0.01.  A1c is 9.6 cholesterol 149 HDL thirty six LDL is 92 and triglycerides are 118 CMP normal with the exception of a glucose of 275. CBC is normal.    Physical examination vital signs noted.  Overweight male no acute distress.  Neck without bruit.  Chest clear.  Heart regular rate rhythm.  Abdomen bowel sounds are positive soft nontender no guarding or rebound.  Right CVA area slight pain increased by left flexion of the trunk and by left rotation.  Also little left scapular pain.    Impression.  Hypertension.  Cholelithiasis.  Right flank pain.  Diabetes mellitus type 2 on insulin with poor control.  Myasthenia gravis.    Plan Zanaflex 4 mg b.i.d. p.r.n. for the back pain.  Thirty with a refill.  Farxiga 10 mg daily 90 pills.  Use the 70 30 insulin 40 units in the morning.  Change to Humulin in 28 units in the evening.  This way we can titrate upward without hypoglycemia hopefully.  Let us know if his blood sugars are doing after few weeks.  Follow-up 1 month.

## 2022-03-20 PROBLEM — I63.9 STROKE: Status: RESOLVED | Noted: 2021-01-30 | Resolved: 2022-03-20

## 2022-04-12 ENCOUNTER — OFFICE VISIT (OUTPATIENT)
Dept: FAMILY MEDICINE | Facility: CLINIC | Age: 77
End: 2022-04-12
Payer: COMMERCIAL

## 2022-04-12 VITALS
HEIGHT: 70 IN | HEART RATE: 68 BPM | BODY MASS INDEX: 32.21 KG/M2 | SYSTOLIC BLOOD PRESSURE: 136 MMHG | DIASTOLIC BLOOD PRESSURE: 70 MMHG | OXYGEN SATURATION: 97 % | WEIGHT: 225 LBS | TEMPERATURE: 99 F

## 2022-04-12 DIAGNOSIS — S20.219A CONTUSION OF RIB, UNSPECIFIED LATERALITY, INITIAL ENCOUNTER: ICD-10-CM

## 2022-04-12 DIAGNOSIS — E11.9 TYPE 2 DIABETES MELLITUS WITHOUT COMPLICATION, WITH LONG-TERM CURRENT USE OF INSULIN: ICD-10-CM
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
## 2022-04-12 DIAGNOSIS — G70.00 MYASTHENIA GRAVIS: ICD-10-CM

## 2022-04-12 DIAGNOSIS — I10 HYPERTENSION, ESSENTIAL: Primary | ICD-10-CM

## 2022-04-12 DIAGNOSIS — Z79.4 TYPE 2 DIABETES MELLITUS WITHOUT COMPLICATION, WITH LONG-TERM CURRENT USE OF INSULIN: ICD-10-CM

## 2022-04-12 DIAGNOSIS — E78.5 HYPERLIPIDEMIA, UNSPECIFIED HYPERLIPIDEMIA TYPE: ICD-10-CM

## 2022-04-12 PROCEDURE — 3078F PR MOST RECENT DIASTOLIC BLOOD PRESSURE < 80 MM HG: ICD-10-PCS | Mod: CPTII,S$GLB,, | Performed by: FAMILY MEDICINE

## 2022-04-12 PROCEDURE — 1101F PT FALLS ASSESS-DOCD LE1/YR: CPT | Mod: CPTII,S$GLB,, | Performed by: FAMILY MEDICINE

## 2022-04-12 PROCEDURE — 99213 PR OFFICE/OUTPT VISIT, EST, LEVL III, 20-29 MIN: ICD-10-PCS | Mod: S$GLB,,, | Performed by: FAMILY MEDICINE

## 2022-04-12 PROCEDURE — 3075F PR MOST RECENT SYSTOLIC BLOOD PRESS GE 130-139MM HG: ICD-10-PCS | Mod: CPTII,S$GLB,, | Performed by: FAMILY MEDICINE

## 2022-04-12 PROCEDURE — 1101F PR PT FALLS ASSESS DOC 0-1 FALLS W/OUT INJ PAST YR: ICD-10-PCS | Mod: CPTII,S$GLB,, | Performed by: FAMILY MEDICINE

## 2022-04-12 PROCEDURE — 1160F RVW MEDS BY RX/DR IN RCRD: CPT | Mod: CPTII,S$GLB,, | Performed by: FAMILY MEDICINE

## 2022-04-12 PROCEDURE — 1126F AMNT PAIN NOTED NONE PRSNT: CPT | Mod: CPTII,S$GLB,, | Performed by: FAMILY MEDICINE

## 2022-04-12 PROCEDURE — 3288F PR FALLS RISK ASSESSMENT DOCUMENTED: ICD-10-PCS | Mod: CPTII,S$GLB,, | Performed by: FAMILY MEDICINE

## 2022-04-12 PROCEDURE — 1159F PR MEDICATION LIST DOCUMENTED IN MEDICAL RECORD: ICD-10-PCS | Mod: CPTII,S$GLB,, | Performed by: FAMILY MEDICINE

## 2022-04-12 PROCEDURE — 3288F FALL RISK ASSESSMENT DOCD: CPT | Mod: CPTII,S$GLB,, | Performed by: FAMILY MEDICINE

## 2022-04-12 PROCEDURE — 1126F PR PAIN SEVERITY QUANTIFIED, NO PAIN PRESENT: ICD-10-PCS | Mod: CPTII,S$GLB,, | Performed by: FAMILY MEDICINE

## 2022-04-12 PROCEDURE — 99213 OFFICE O/P EST LOW 20 MIN: CPT | Mod: S$GLB,,, | Performed by: FAMILY MEDICINE

## 2022-04-12 PROCEDURE — 1160F PR REVIEW ALL MEDS BY PRESCRIBER/CLIN PHARMACIST DOCUMENTED: ICD-10-PCS | Mod: CPTII,S$GLB,, | Performed by: FAMILY MEDICINE

## 2022-04-12 PROCEDURE — 3075F SYST BP GE 130 - 139MM HG: CPT | Mod: CPTII,S$GLB,, | Performed by: FAMILY MEDICINE

## 2022-04-12 PROCEDURE — 3078F DIAST BP <80 MM HG: CPT | Mod: CPTII,S$GLB,, | Performed by: FAMILY MEDICINE

## 2022-04-12 PROCEDURE — 1159F MED LIST DOCD IN RCRD: CPT | Mod: CPTII,S$GLB,, | Performed by: FAMILY MEDICINE

## 2022-04-15 NOTE — PROGRESS NOTES
Subjective:       Patient ID: Fazal Wade is a 76 y.o. male.    Chief Complaint: Follow-up (1 month)    He stop the 70 30.  Because his sugar was not under control.  Went to straight nonhealing in.  Told him this was most likely lower percentage of long-acting insulin.  But he is doing reasonably well with this.  45 units twice a day for the past 2 days.  Morning sugars 120.  Once time 1:35 a.m..  Blood pressure is elevated at home also.  High here on usually but then on repeat was normal.  Diabetes mellitus type 2 on insulin.  Myasthenia.  Fell and bruised his ribs about 3 months ago doing better.    Physical examination vital signs noted.  Neck without bruit.  Chest clear.  Heart regular rate rhythm.  Extremities without edema.    Review of Systems    Objective:      Physical Exam    Assessment:       1. Hypertension, essential    2. Type 2 diabetes mellitus without complication, with long-term current use of insulin    3. Fall, sequela    4. Contusion of rib, unspecified laterality, initial encounter    5. Myasthenia gravis    6. Hyperlipidemia, unspecified hyperlipidemia type        Plan:       Hypertension, essential  -     Comprehensive Metabolic Panel; Future; Expected date: 06/10/2022    Type 2 diabetes mellitus without complication, with long-term current use of insulin  -     Hemoglobin A1C; Future; Expected date: 06/10/2022    Fall, sequela    Contusion of rib, unspecified laterality, initial encounter    Myasthenia gravis    Hyperlipidemia, unspecified hyperlipidemia type  -     Lipid Panel; Future; Expected date: 06/10/2022      Continue current medications.  Follow-up in 2 months with labs A1c CMP and lipids.

## 2022-05-16 RX ORDER — MYCOPHENOLATE MOFETIL 500 MG/1
1250 TABLET ORAL 2 TIMES DAILY
Qty: 180 TABLET | Refills: 2 | OUTPATIENT
Start: 2022-05-16

## 2022-05-16 NOTE — TELEPHONE ENCOUNTER
Sent to christopher     ----- Message from Dereje Guan sent at 5/16/2022 10:53 AM CDT -----  Contact: pt  Needs in 2 days or pt will go blind according to him       Type: Rx Refill Request    Who Called:pt  Refill or New Rx:Refill  Rx Name and Strength:    mycophenolate (CELLCEPT) 500 mg Tab    How is the patient currently taking it?(ex.1xday): As Directed  Is this a 30 day or 90 day Rx: As Directed  Preferred Pharmacy with Phone Number[facility]    [facility] [address] [place], [place] [facility] [address] [facility] [address] [facility] [address]  Phone: [phone] Fax: [phone]       Pt phone  [phone]

## 2022-05-17 ENCOUNTER — TELEPHONE (OUTPATIENT)
Dept: FAMILY MEDICINE | Facility: CLINIC | Age: 77
End: 2022-05-17
Payer: MEDICARE

## 2022-05-17 RX ORDER — MYCOPHENOLATE MOFETIL 500 MG/1
TABLET ORAL
Qty: 75 TABLET | Refills: 0 | Status: SHIPPED | OUTPATIENT
Start: 2022-05-17 | End: 2022-06-13

## 2022-05-17 NOTE — TELEPHONE ENCOUNTER
Pt advised christopher doesn't prescribe cellcept he need to  who gave it to him. Christopher will send 1 month supply ONLY pt need to contact dr who gives it to him pt verbalized understanding

## 2022-05-17 NOTE — TELEPHONE ENCOUNTER
No answer dont see who prescribed it in our system and also how does he take it - no vm set up      ----- Message from Kortney Perkins sent at 5/17/2022  9:34 AM CDT -----  Contact: pt  Type:  RX Refill Request    Who Called:  pt  Refill or New Rx:  refill  RX Name and Strength:  mycophenolate (CELLCEPT) 500 mg Tab  How is the patient currently taking it? (ex. 1XDay):  As Directed  Is this a 30 day or 90 day RX:  30  Preferred Pharmacy with phone number:  [facility]  Local or Mail Order:  local  Ordering Provider:  Parish Robles Call Back Number:  [phone]    Additional Information:  Please contact pt upon completion-Thank you~

## 2022-06-02 DIAGNOSIS — G70.00 MG (MYASTHENIA GRAVIS): Primary | ICD-10-CM

## 2022-06-09 LAB
ALBUMIN SERPL-MCNC: 4.1 G/DL (ref 3.6–5.1)
ALBUMIN/GLOB SERPL: 1.6 (CALC) (ref 1–2.5)
ALP SERPL-CCNC: 53 U/L (ref 35–144)
ALT SERPL-CCNC: 9 U/L (ref 9–46)
AST SERPL-CCNC: 10 U/L (ref 10–35)
BILIRUB SERPL-MCNC: 0.5 MG/DL (ref 0.2–1.2)
BUN SERPL-MCNC: 23 MG/DL (ref 7–25)
BUN/CREAT SERPL: ABNORMAL (CALC) (ref 6–22)
CALCIUM SERPL-MCNC: 9.1 MG/DL (ref 8.6–10.3)
CHLORIDE SERPL-SCNC: 101 MMOL/L (ref 98–110)
CHOLEST SERPL-MCNC: 148 MG/DL
CHOLEST/HDLC SERPL: 4.8 (CALC)
CO2 SERPL-SCNC: 29 MMOL/L (ref 20–32)
CREAT SERPL-MCNC: 0.86 MG/DL (ref 0.7–1.18)
GLOBULIN SER CALC-MCNC: 2.6 G/DL (CALC) (ref 1.9–3.7)
GLUCOSE SERPL-MCNC: 135 MG/DL (ref 65–99)
HBA1C MFR BLD: 9.6 % OF TOTAL HGB
HDLC SERPL-MCNC: 31 MG/DL
LDLC SERPL CALC-MCNC: 96 MG/DL (CALC)
NONHDLC SERPL-MCNC: 117 MG/DL (CALC)
POTASSIUM SERPL-SCNC: 4.3 MMOL/L (ref 3.5–5.3)
PROT SERPL-MCNC: 6.7 G/DL (ref 6.1–8.1)
SODIUM SERPL-SCNC: 139 MMOL/L (ref 135–146)
TRIGL SERPL-MCNC: 113 MG/DL

## 2022-06-13 ENCOUNTER — OFFICE VISIT (OUTPATIENT)
Dept: FAMILY MEDICINE | Facility: CLINIC | Age: 77
End: 2022-06-13
Payer: MEDICARE

## 2022-06-13 ENCOUNTER — TELEPHONE (OUTPATIENT)
Dept: DIABETES | Facility: CLINIC | Age: 77
End: 2022-06-13
Payer: MEDICARE

## 2022-06-13 VITALS
TEMPERATURE: 98 F | HEART RATE: 78 BPM | BODY MASS INDEX: 33.73 KG/M2 | OXYGEN SATURATION: 99 % | HEIGHT: 70 IN | SYSTOLIC BLOOD PRESSURE: 138 MMHG | WEIGHT: 235.63 LBS | DIASTOLIC BLOOD PRESSURE: 88 MMHG

## 2022-06-13 DIAGNOSIS — E11.42 TYPE 2 DIABETES MELLITUS WITH DIABETIC POLYNEUROPATHY, WITH LONG-TERM CURRENT USE OF INSULIN: ICD-10-CM

## 2022-06-13 DIAGNOSIS — Z79.4 TYPE 2 DIABETES MELLITUS WITHOUT COMPLICATION, WITH LONG-TERM CURRENT USE OF INSULIN: ICD-10-CM

## 2022-06-13 DIAGNOSIS — Z79.82 ASPIRIN LONG-TERM USE: ICD-10-CM

## 2022-06-13 DIAGNOSIS — I10 HYPERTENSION, ESSENTIAL: ICD-10-CM

## 2022-06-13 DIAGNOSIS — Z79.4 TYPE 2 DIABETES MELLITUS WITH DIABETIC POLYNEUROPATHY, WITH LONG-TERM CURRENT USE OF INSULIN: ICD-10-CM

## 2022-06-13 DIAGNOSIS — G70.00 MYASTHENIA GRAVIS: Primary | ICD-10-CM

## 2022-06-13 DIAGNOSIS — E78.5 HYPERLIPIDEMIA, UNSPECIFIED HYPERLIPIDEMIA TYPE: ICD-10-CM

## 2022-06-13 DIAGNOSIS — E11.9 TYPE 2 DIABETES MELLITUS WITHOUT COMPLICATION, WITH LONG-TERM CURRENT USE OF INSULIN: ICD-10-CM

## 2022-06-13 PROCEDURE — 3079F DIAST BP 80-89 MM HG: CPT | Mod: CPTII,S$GLB,, | Performed by: FAMILY MEDICINE

## 2022-06-13 PROCEDURE — 1160F RVW MEDS BY RX/DR IN RCRD: CPT | Mod: CPTII,S$GLB,, | Performed by: FAMILY MEDICINE

## 2022-06-13 PROCEDURE — 1160F PR REVIEW ALL MEDS BY PRESCRIBER/CLIN PHARMACIST DOCUMENTED: ICD-10-PCS | Mod: CPTII,S$GLB,, | Performed by: FAMILY MEDICINE

## 2022-06-13 PROCEDURE — 1159F MED LIST DOCD IN RCRD: CPT | Mod: CPTII,S$GLB,, | Performed by: FAMILY MEDICINE

## 2022-06-13 PROCEDURE — 1126F PR PAIN SEVERITY QUANTIFIED, NO PAIN PRESENT: ICD-10-PCS | Mod: CPTII,S$GLB,, | Performed by: FAMILY MEDICINE

## 2022-06-13 PROCEDURE — 99214 PR OFFICE/OUTPT VISIT, EST, LEVL IV, 30-39 MIN: ICD-10-PCS | Mod: S$GLB,,, | Performed by: FAMILY MEDICINE

## 2022-06-13 PROCEDURE — 3288F FALL RISK ASSESSMENT DOCD: CPT | Mod: CPTII,S$GLB,, | Performed by: FAMILY MEDICINE

## 2022-06-13 PROCEDURE — 1159F PR MEDICATION LIST DOCUMENTED IN MEDICAL RECORD: ICD-10-PCS | Mod: CPTII,S$GLB,, | Performed by: FAMILY MEDICINE

## 2022-06-13 PROCEDURE — 99214 OFFICE O/P EST MOD 30 MIN: CPT | Mod: S$GLB,,, | Performed by: FAMILY MEDICINE

## 2022-06-13 PROCEDURE — 1101F PT FALLS ASSESS-DOCD LE1/YR: CPT | Mod: CPTII,S$GLB,, | Performed by: FAMILY MEDICINE

## 2022-06-13 PROCEDURE — 3075F PR MOST RECENT SYSTOLIC BLOOD PRESS GE 130-139MM HG: ICD-10-PCS | Mod: CPTII,S$GLB,, | Performed by: FAMILY MEDICINE

## 2022-06-13 PROCEDURE — 1101F PR PT FALLS ASSESS DOC 0-1 FALLS W/OUT INJ PAST YR: ICD-10-PCS | Mod: CPTII,S$GLB,, | Performed by: FAMILY MEDICINE

## 2022-06-13 PROCEDURE — 1126F AMNT PAIN NOTED NONE PRSNT: CPT | Mod: CPTII,S$GLB,, | Performed by: FAMILY MEDICINE

## 2022-06-13 PROCEDURE — 3288F PR FALLS RISK ASSESSMENT DOCUMENTED: ICD-10-PCS | Mod: CPTII,S$GLB,, | Performed by: FAMILY MEDICINE

## 2022-06-13 PROCEDURE — 3075F SYST BP GE 130 - 139MM HG: CPT | Mod: CPTII,S$GLB,, | Performed by: FAMILY MEDICINE

## 2022-06-13 PROCEDURE — 3079F PR MOST RECENT DIASTOLIC BLOOD PRESSURE 80-89 MM HG: ICD-10-PCS | Mod: CPTII,S$GLB,, | Performed by: FAMILY MEDICINE

## 2022-06-13 RX ORDER — SIMVASTATIN 20 MG/1
20 TABLET, FILM COATED ORAL NIGHTLY
Qty: 90 TABLET | Refills: 1 | Status: SHIPPED | OUTPATIENT
Start: 2022-06-13 | End: 2022-08-01

## 2022-06-13 RX ORDER — LISINOPRIL 40 MG/1
40 TABLET ORAL DAILY
Qty: 90 TABLET | Refills: 1 | Status: SHIPPED | OUTPATIENT
Start: 2022-06-13 | End: 2022-09-13

## 2022-06-13 RX ORDER — AMLODIPINE BESYLATE 5 MG/1
5 TABLET ORAL DAILY
Qty: 90 TABLET | Refills: 1 | Status: SHIPPED | OUTPATIENT
Start: 2022-06-13 | End: 2022-09-13

## 2022-06-13 RX ORDER — CARVEDILOL 12.5 MG/1
12.5 TABLET ORAL 2 TIMES DAILY
Qty: 180 TABLET | Refills: 1 | Status: SHIPPED | OUTPATIENT
Start: 2022-06-13 | End: 2022-08-26

## 2022-06-13 RX ORDER — DAPAGLIFLOZIN 10 MG/1
10 TABLET, FILM COATED ORAL DAILY
Qty: 90 TABLET | Refills: 1 | Status: SHIPPED | OUTPATIENT
Start: 2022-06-13 | End: 2022-12-13

## 2022-06-14 NOTE — PROGRESS NOTES
Hypertension doing well.  Diabetes mellitus 2 on insulin.  Has polyneuropathy.  Feels his sugar was abnormal because he was only resting about 3 hours at a time.  Using Humulin in 50 units b.i.d..  Quit the Farxiga.  Said it was not helping his sugar.  He gets up in needs every few hours when he is awake at night.  Says he is sleeping better now in sugar is doing better.  Checks his sugar for 5 times a day.  Occasional hypoglycemia.  Hyperlipidemia doing okay cholesterol 148 HDL 31 triglycerides 113 LDL 96. A1c is no better it is 9.6.  Fasting sugar 135.  CMP otherwise okay.    Physical examination vital signs noted.  No acute distress.  Neck without bruit.  Chest clear.  Heart regular rate rhythm.  Extremities without edema.  Exam of the feet 2+ pedal pulses.  No calluses skin breakdown or ulcerations.  Sensation very decreased.  Present only 0 5 spots tested left foot.  Three of 5 spots tested in the right foot.  Subjective:       Patient ID: Fazal Wade is a 76 y.o. male.    Chief Complaint: Follow-up (2 month)    HPI    Objective:        Assessment:       1. Myasthenia gravis    2. Hyperlipidemia, unspecified hyperlipidemia type    3. Hypertension, essential    4. Aspirin long-term use    5. Type 2 diabetes mellitus without complication, with long-term current use of insulin    6. Type 2 diabetes mellitus with diabetic polyneuropathy, with long-term current use of insulin        Plan:       Myasthenia gravis    Hyperlipidemia, unspecified hyperlipidemia type    Hypertension, essential    Aspirin long-term use    Type 2 diabetes mellitus without complication, with long-term current use of insulin  -     Ambulatory referral/consult to Diabetes Education; Future; Expected date: 06/20/2022    Type 2 diabetes mellitus with diabetic polyneuropathy, with long-term current use of insulin    Other orders  -     simvastatin (ZOCOR) 20 MG tablet; Take 1 tablet (20 mg total) by mouth every evening.  Dispense: 90  tablet; Refill: 1  -     lisinopriL (PRINIVIL,ZESTRIL) 40 MG tablet; Take 1 tablet (40 mg total) by mouth once daily.  Dispense: 90 tablet; Refill: 1  -     dapagliflozin (FARXIGA) 10 mg tablet; Take 1 tablet (10 mg total) by mouth once daily.  Dispense: 90 tablet; Refill: 1  -     carvediloL (COREG) 12.5 MG tablet; Take 1 tablet (12.5 mg total) by mouth 2 (two) times daily.  Dispense: 180 tablet; Refill: 1  -     amLODIPine (NORVASC) 5 MG tablet; Take 1 tablet (5 mg total) by mouth once daily.  Dispense: 90 tablet; Refill: 1      Refilled medications.  He needs to go for his diabetic eye exam.  Raise his morning insulin to 60 units of the Humulin in continue the evening dose at 50 units.  Diabetic Education ordered again.  Follow-up in 3 months.

## 2022-06-24 ENCOUNTER — HOSPITAL ENCOUNTER (OUTPATIENT)
Dept: RADIOLOGY | Facility: HOSPITAL | Age: 77
Discharge: HOME | End: 2022-06-24
Attending: STUDENT IN AN ORGANIZED HEALTH CARE EDUCATION/TRAINING PROGRAM
Payer: MEDICARE

## 2022-06-24 DIAGNOSIS — G70.00 MG (MYASTHENIA GRAVIS): ICD-10-CM

## 2022-06-24 PROCEDURE — 71250 CT THORAX DX C-: CPT | Mod: TC,PO

## 2022-07-14 RX ORDER — AMLODIPINE BESYLATE 10 MG/1
10 TABLET ORAL DAILY
Qty: 90 TABLET | Refills: 0 | Status: SHIPPED | OUTPATIENT
Start: 2022-07-14 | End: 2022-09-13 | Stop reason: SDUPTHER

## 2022-07-14 NOTE — TELEPHONE ENCOUNTER
Done  ----- Message from Navneet Anaya sent at 7/14/2022 12:22 PM CDT -----  Type: Needs Medical Advice  Who Called: Pt   Symptoms (please be specific):   How long has patient had these symptoms:    Pharmacy name and phone #:    [facility] Call Back Number: [phone]  Additional Information: Pt requesting a call back concerning his b/p.

## 2022-08-26 RX ORDER — CARVEDILOL 12.5 MG/1
TABLET ORAL
Qty: 180 TABLET | Refills: 3 | Status: SHIPPED | OUTPATIENT
Start: 2022-08-26 | End: 2022-09-13 | Stop reason: SDUPTHER

## 2022-08-26 NOTE — TELEPHONE ENCOUNTER
Refill Decision Note   Fazal Wade  is requesting a refill authorization.  Brief Assessment and Rationale for Refill:  Approve     Medication Therapy Plan:       Medication Reconciliation Completed: No   Comments:     No Care Gaps recommended.     Note composed:4:31 PM 08/26/2022

## 2022-08-26 NOTE — TELEPHONE ENCOUNTER
No new care gaps identified.  [facility] Embedded Care Gaps. Reference number: [id number]. 8/26/2022   2:57:43 PM CDT

## 2022-09-07 DIAGNOSIS — Z79.4 TYPE 2 DIABETES MELLITUS WITHOUT COMPLICATION, WITH LONG-TERM CURRENT USE OF INSULIN: ICD-10-CM

## 2022-09-07 DIAGNOSIS — I10 HYPERTENSION, ESSENTIAL: ICD-10-CM

## 2022-09-07 DIAGNOSIS — E78.5 HYPERLIPIDEMIA, UNSPECIFIED HYPERLIPIDEMIA TYPE: Primary | ICD-10-CM

## 2022-09-07 DIAGNOSIS — E11.9 TYPE 2 DIABETES MELLITUS WITHOUT COMPLICATION, WITH LONG-TERM CURRENT USE OF INSULIN: ICD-10-CM

## 2022-09-08 LAB
ALBUMIN SERPL-MCNC: 4.1 G/DL (ref 3.6–5.1)
ALBUMIN/GLOB SERPL: 1.7 (CALC) (ref 1–2.5)
ALP SERPL-CCNC: 52 U/L (ref 35–144)
ALT SERPL-CCNC: 5 U/L (ref 9–46)
AST SERPL-CCNC: 14 U/L (ref 10–35)
BASOPHILS # BLD AUTO: 47 CELLS/UL (ref 0–200)
BASOPHILS NFR BLD AUTO: 0.5 %
BILIRUB SERPL-MCNC: 0.6 MG/DL (ref 0.2–1.2)
BUN SERPL-MCNC: 16 MG/DL (ref 7–25)
BUN/CREAT SERPL: ABNORMAL (CALC) (ref 6–22)
CALCIUM SERPL-MCNC: 9.2 MG/DL (ref 8.6–10.3)
CHLORIDE SERPL-SCNC: 101 MMOL/L (ref 98–110)
CHOLEST SERPL-MCNC: 143 MG/DL
CHOLEST/HDLC SERPL: 4.2 (CALC)
CO2 SERPL-SCNC: 25 MMOL/L (ref 20–32)
CREAT SERPL-MCNC: 0.79 MG/DL (ref 0.7–1.28)
EGFR: 91 ML/MIN/1.73M2
EOSINOPHIL # BLD AUTO: 263 CELLS/UL (ref 15–500)
EOSINOPHIL NFR BLD AUTO: 2.8 %
ERYTHROCYTE [DISTWIDTH] IN BLOOD BY AUTOMATED COUNT: 12.6 % (ref 11–15)
GLOBULIN SER CALC-MCNC: 2.4 G/DL (CALC) (ref 1.9–3.7)
GLUCOSE SERPL-MCNC: 229 MG/DL (ref 65–99)
HBA1C MFR BLD: 9 % OF TOTAL HGB
HCT VFR BLD AUTO: 43.9 % (ref 38.5–50)
HDLC SERPL-MCNC: 34 MG/DL
HGB BLD-MCNC: 14.1 G/DL (ref 13.2–17.1)
LDLC SERPL CALC-MCNC: 88 MG/DL (CALC)
LYMPHOCYTES # BLD AUTO: 2200 CELLS/UL (ref 850–3900)
LYMPHOCYTES NFR BLD AUTO: 23.4 %
MCH RBC QN AUTO: 30.3 PG (ref 27–33)
MCHC RBC AUTO-ENTMCNC: 32.1 G/DL (ref 32–36)
MCV RBC AUTO: 94.4 FL (ref 80–100)
MONOCYTES # BLD AUTO: 959 CELLS/UL (ref 200–950)
MONOCYTES NFR BLD AUTO: 10.2 %
NEUTROPHILS # BLD AUTO: 5931 CELLS/UL (ref 1500–7800)
NEUTROPHILS NFR BLD AUTO: 63.1 %
NONHDLC SERPL-MCNC: 109 MG/DL (CALC)
PLATELET # BLD AUTO: 211 THOUSAND/UL (ref 140–400)
PMV BLD REES-ECKER: 10 FL (ref 7.5–12.5)
POTASSIUM SERPL-SCNC: 4.4 MMOL/L (ref 3.5–5.3)
PROT SERPL-MCNC: 6.5 G/DL (ref 6.1–8.1)
RBC # BLD AUTO: 4.65 MILLION/UL (ref 4.2–5.8)
SODIUM SERPL-SCNC: 140 MMOL/L (ref 135–146)
TRIGL SERPL-MCNC: 117 MG/DL
WBC # BLD AUTO: 9.4 THOUSAND/UL (ref 3.8–10.8)

## 2022-09-09 ENCOUNTER — TELEPHONE (OUTPATIENT)
Dept: FAMILY MEDICINE | Facility: CLINIC | Age: 77
End: 2022-09-09
Payer: MEDICARE

## 2022-09-09 NOTE — TELEPHONE ENCOUNTER
----- Message from Sylvester Lugo III, MD sent at 9/8/2022 10:25 PM CDT -----  ABNORMAL followup to discuss further action.

## 2022-09-09 NOTE — TELEPHONE ENCOUNTER
Patient has follow up appointment    Rx refilled. Electronically signed. PDMP accessed, no issues.

## 2022-09-13 ENCOUNTER — OFFICE VISIT (OUTPATIENT)
Dept: FAMILY MEDICINE | Facility: CLINIC | Age: 77
End: 2022-09-13
Payer: MEDICARE

## 2022-09-13 VITALS
TEMPERATURE: 97 F | WEIGHT: 216.38 LBS | BODY MASS INDEX: 30.98 KG/M2 | HEIGHT: 70 IN | DIASTOLIC BLOOD PRESSURE: 72 MMHG | OXYGEN SATURATION: 96 % | HEART RATE: 54 BPM | SYSTOLIC BLOOD PRESSURE: 140 MMHG

## 2022-09-13 DIAGNOSIS — E11.42 TYPE 2 DIABETES MELLITUS WITH DIABETIC POLYNEUROPATHY, WITH LONG-TERM CURRENT USE OF INSULIN: ICD-10-CM

## 2022-09-13 DIAGNOSIS — Z79.4 TYPE 2 DIABETES MELLITUS WITH DIABETIC POLYNEUROPATHY, WITH LONG-TERM CURRENT USE OF INSULIN: ICD-10-CM

## 2022-09-13 DIAGNOSIS — E66.01 MORBID OBESITY: ICD-10-CM

## 2022-09-13 DIAGNOSIS — E78.5 HYPERLIPIDEMIA, UNSPECIFIED HYPERLIPIDEMIA TYPE: ICD-10-CM

## 2022-09-13 DIAGNOSIS — I10 HYPERTENSION, ESSENTIAL: Primary | ICD-10-CM

## 2022-09-13 PROCEDURE — 3077F PR MOST RECENT SYSTOLIC BLOOD PRESSURE >= 140 MM HG: ICD-10-PCS | Mod: CPTII,S$GLB,,

## 2022-09-13 PROCEDURE — 1159F MED LIST DOCD IN RCRD: CPT | Mod: CPTII,S$GLB,,

## 2022-09-13 PROCEDURE — 1160F PR REVIEW ALL MEDS BY PRESCRIBER/CLIN PHARMACIST DOCUMENTED: ICD-10-PCS | Mod: CPTII,S$GLB,,

## 2022-09-13 PROCEDURE — 1160F RVW MEDS BY RX/DR IN RCRD: CPT | Mod: CPTII,S$GLB,,

## 2022-09-13 PROCEDURE — 1159F PR MEDICATION LIST DOCUMENTED IN MEDICAL RECORD: ICD-10-PCS | Mod: CPTII,S$GLB,,

## 2022-09-13 PROCEDURE — 3077F SYST BP >= 140 MM HG: CPT | Mod: CPTII,S$GLB,,

## 2022-09-13 PROCEDURE — 99214 PR OFFICE/OUTPT VISIT, EST, LEVL IV, 30-39 MIN: ICD-10-PCS | Mod: S$GLB,,,

## 2022-09-13 PROCEDURE — 99499 UNLISTED E&M SERVICE: CPT | Mod: S$GLB,,,

## 2022-09-13 PROCEDURE — 1126F AMNT PAIN NOTED NONE PRSNT: CPT | Mod: CPTII,S$GLB,,

## 2022-09-13 PROCEDURE — 1126F PR PAIN SEVERITY QUANTIFIED, NO PAIN PRESENT: ICD-10-PCS | Mod: CPTII,S$GLB,,

## 2022-09-13 PROCEDURE — 3078F DIAST BP <80 MM HG: CPT | Mod: CPTII,S$GLB,,

## 2022-09-13 PROCEDURE — 99214 OFFICE O/P EST MOD 30 MIN: CPT | Mod: S$GLB,,,

## 2022-09-13 PROCEDURE — 99499 RISK ADDL DX/OHS AUDIT: ICD-10-PCS | Mod: S$GLB,,,

## 2022-09-13 PROCEDURE — 3078F PR MOST RECENT DIASTOLIC BLOOD PRESSURE < 80 MM HG: ICD-10-PCS | Mod: CPTII,S$GLB,,

## 2022-09-13 RX ORDER — CARVEDILOL 12.5 MG/1
12.5 TABLET ORAL 2 TIMES DAILY
Qty: 180 TABLET | Refills: 3 | Status: SHIPPED | OUTPATIENT
Start: 2022-09-13 | End: 2023-05-29

## 2022-09-13 RX ORDER — SIMVASTATIN 20 MG/1
20 TABLET, FILM COATED ORAL NIGHTLY
Qty: 90 TABLET | Refills: 3 | Status: SHIPPED | OUTPATIENT
Start: 2022-09-13 | End: 2023-11-08

## 2022-09-13 RX ORDER — AMLODIPINE BESYLATE 10 MG/1
10 TABLET ORAL DAILY
Qty: 90 TABLET | Refills: 0 | Status: SHIPPED | OUTPATIENT
Start: 2022-09-13 | End: 2022-10-26

## 2022-09-13 RX ORDER — AMLODIPINE BESYLATE 5 MG/1
TABLET ORAL
Qty: 90 TABLET | Refills: 0 | OUTPATIENT
Start: 2022-09-13

## 2022-09-13 RX ORDER — LISINOPRIL 40 MG/1
TABLET ORAL
Qty: 90 TABLET | Refills: 2 | Status: SHIPPED | OUTPATIENT
Start: 2022-09-13 | End: 2023-06-30

## 2022-09-13 NOTE — TELEPHONE ENCOUNTER
Refill Decision Note   Fazal Wade  is requesting a refill authorization.  Brief Assessment and Rationale for Refill:  Quick Discontinue  Approve     Medication Therapy Plan:  Med d/c by Diana Aranda NP on 9/13/2022    Medication Reconciliation Completed: No   Comments:     No Care Gaps recommended.     Note composed:11:14 AM 09/13/2022

## 2022-09-13 NOTE — PROGRESS NOTES
Subjective:       Patient ID: Fazal Wade is a 77 y.o. male.    Chief Complaint: Follow-up    Patient presents to clinic for 3 month follow up.  He states he has been doing well overall.  Has been having some swelling in his legs.  Denies shortness of breath or chest pain.  Has some tingling his bilateral feet.  Diabetes: HA1C 9.0 which is slightly improved from 9.6 3 months ago.  He states he is taking NPH 70 units morning and 50 units at night.  His blood sugars at home have been in the 115-130s.  He has lost 19 pounds since last visit.  He has been watching his diet.  He stopped taking Farxiga because he felt it was not helping.   He is due for diabetic eye exam but he does not with to have this done.  He is not interested in any vaccinations at this time.  Not interested in Colonoscopy.     Review of patient's allergies indicates:   Allergen Reactions    Levaquin [levofloxacin]      Social Determinants of Health     Tobacco Use: Low Risk     Smoking Tobacco Use: Never    Smokeless Tobacco Use: Never   Alcohol Use: Not on file   Financial Resource Strain: Not on file   Food Insecurity: Not on file   Transportation Needs: Not on file   Physical Activity: Not on file   Stress: Not on file   Social Connections: Not on file   Housing Stability: Not on file   Depression PHQ-4: Low Risk     Last PHQ Score: 0      Past Medical History:   Diagnosis Date    Diabetes mellitus, type 2     Hyperlipidemia     Hypertension       Past Surgical History:   Procedure Laterality Date    EYE SURGERY        Social History     Socioeconomic History    Marital status: Single         Current Outpatient Medications:     amLODIPine (NORVASC) 10 MG tablet, Take 1 tablet (10 mg total) by mouth once daily., Disp: 90 tablet, Rfl: 0    aspirin (ECOTRIN) 81 MG EC tablet, Take 1 tablet (81 mg total) by mouth once daily., Disp: 90 tablet, Rfl: 1    carvediloL (COREG) 12.5 MG tablet, Take 1 tablet (12.5 mg total) by mouth 2 (two) times  daily., Disp: 180 tablet, Rfl: 3    dapagliflozin (FARXIGA) 10 mg tablet, Take 1 tablet (10 mg total) by mouth once daily., Disp: 90 tablet, Rfl: 1    fluticasone propionate (FLONASE) 50 mcg/actuation nasal spray, 2 sprays (100 mcg total) by Each Nostril route once daily., Disp: 16 g, Rfl: 5    insulin  unit/mL injection, Inject 50 Units into the skin 2 (two) times daily before meals. Pt states 40-50 units, Disp: , Rfl:     lisinopriL (PRINIVIL,ZESTRIL) 40 MG tablet, Take 1 tablet by mouth once daily, Disp: 90 tablet, Rfl: 2    mycophenolate (CELLCEPT) 500 mg Tab, Take 1,250 mg by mouth 2 (two) times daily. 2.5 tablets twice daily, Disp: , Rfl:     simvastatin (ZOCOR) 20 MG tablet, Take 1 tablet (20 mg total) by mouth every evening., Disp: 90 tablet, Rfl: 3    Current Facility-Administered Medications:     methylPREDNISolone sodium succinate injection 40 mg, 40 mg, Intravenous, Once PRN, Jacques Cali MD    Lab Results   Component Value Date    WBC 9.4 09/07/2022    HGB 14.1 09/07/2022    HCT 43.9 09/07/2022     09/07/2022    CHOL 143 09/07/2022    TRIG 117 09/07/2022    HDL 34 (L) 09/07/2022    ALT 5 (L) 09/07/2022    AST 14 09/07/2022     09/07/2022    K 4.4 09/07/2022     09/07/2022    CREATININE 0.79 09/07/2022    BUN 16 09/07/2022    CO2 25 09/07/2022    TSH 0.601 02/01/2021    INR 1.0 08/19/2021    HGBA1C 9.0 (H) 09/07/2022    MICROALBUR 0.6 02/07/2022       Review of Systems   Constitutional:  Positive for fatigue.   Respiratory: Negative.  Negative for shortness of breath.    Cardiovascular:  Positive for leg swelling. Negative for chest pain and palpitations.   Gastrointestinal: Negative.    Endocrine: Negative.    Genitourinary: Negative.    Musculoskeletal: Negative.    Neurological: Negative.    Hematological: Negative.    Psychiatric/Behavioral: Negative.       Objective:      Physical Exam  Vitals reviewed.   Constitutional:       Appearance: Normal appearance.   HENT:       Head: Normocephalic and atraumatic.      Right Ear: Tympanic membrane normal.      Left Ear: Tympanic membrane normal.   Cardiovascular:      Rate and Rhythm: Normal rate and regular rhythm.      Pulses: Normal pulses.           Radial pulses are 2+ on the right side and 2+ on the left side.        Posterior tibial pulses are 2+ on the right side and 2+ on the left side.      Heart sounds: Normal heart sounds.   Pulmonary:      Effort: Pulmonary effort is normal.      Breath sounds: Normal breath sounds.   Abdominal:      General: Abdomen is flat. Bowel sounds are normal.      Palpations: Abdomen is soft.   Musculoskeletal:      Right lower le+ Edema present.      Left lower le+ Edema present.   Skin:     Capillary Refill: Capillary refill takes less than 2 seconds.   Neurological:      General: No focal deficit present.      Mental Status: He is alert and oriented to person, place, and time.   Psychiatric:         Mood and Affect: Mood normal.         Behavior: Behavior normal.       Assessment:       1. Hypertension, essential    2. Morbid obesity    3. Type 2 diabetes mellitus with diabetic polyneuropathy, with long-term current use of insulin    4. Hyperlipidemia, unspecified hyperlipidemia type          Plan:       Fazal was seen today for follow-up.    Diagnoses and all orders for this visit:    Hypertension, essential  -     CBC Auto Differential; Future  -     Comprehensive Metabolic Panel; Future  -     amLODIPine (NORVASC) 10 MG tablet; Take 1 tablet (10 mg total) by mouth once daily.  -     carvediloL (COREG) 12.5 MG tablet; Take 1 tablet (12.5 mg total) by mouth 2 (two) times daily.  -     CBC Auto Differential  -     Comprehensive Metabolic Panel    Morbid obesity    Type 2 diabetes mellitus with diabetic polyneuropathy, with long-term current use of insulin  -     Hemoglobin A1C; Future  -     Hemoglobin A1C    Hyperlipidemia, unspecified hyperlipidemia type  -     simvastatin (ZOCOR) 20  MG tablet; Take 1 tablet (20 mg total) by mouth every evening.     Discussed adding in mild diuretic to decrease edema in legs and help with blood pressure but patient declines at this time  Discussed continued weight loss, dietary changes including avoidance of excess sugar and carbohydrates.  Patient states he does watch his diet.  He does not want to add or adjust any of his diabetic medications.  Will recheck HA1C in 3 months.    He will be seeing Neurologist next week for management of Myasthenia Gravis.    Recommend follow up in 3 months.  Have labs done prior to next appointment.

## 2022-09-13 NOTE — TELEPHONE ENCOUNTER
No new care gaps identified.  [facility] Embedded Care Gaps. Reference number: [id number]. 9/13/2022   7:28:29 AM LETYT

## 2022-09-14 NOTE — TELEPHONE ENCOUNTER
Sending to [facility]. Advised thell send it to DME    ----- Message from Kit Aguero sent at 9/14/2022  9:03 AM CDT -----  Contact: Self  Type:  RX Refill Request    Who Called:  Patient  Refill or New Rx: refill  RX Name and Strength:   test strips, TRUMetrix    How is the patient currently taking it? (ex. 1XDay):  as directed  Is this a 30 day or 90 day RX:  90  Preferred Pharmacy with phone number:  [facility]  Local or Mail Order:  Mail  Ordering Provider:  Parish Robles Call Back Number:  [phone]   Additional Information:   Increased to testing 6-8 times a day, states he ran short with this order.

## 2022-09-20 ENCOUNTER — TELEPHONE (OUTPATIENT)
Dept: FAMILY MEDICINE | Facility: CLINIC | Age: 77
End: 2022-09-20
Payer: MEDICARE

## 2022-09-20 NOTE — TELEPHONE ENCOUNTER
Called and spoke to patient that he only needs to check his sugar 3 times a day and he said he needs to check frequently cause his sugar drops to 45 at night and throughout the day.

## 2022-09-21 ENCOUNTER — TELEPHONE (OUTPATIENT)
Dept: FAMILY MEDICINE | Facility: CLINIC | Age: 77
End: 2022-09-21
Payer: MEDICARE

## 2022-09-22 NOTE — TELEPHONE ENCOUNTER
----- Message from Navneet Anaya sent at 9/21/2022  8:42 AM CDT -----  Contact: Jaylene  Type: Needs Medical Advice  Who Called: Jaylene with bttn  Symptoms (please be specific):   How long has patient had these symptoms:    Pharmacy name and phone #:    [facility] Call Back Number: fax# [phone], [phone]  Additional Information: Jaylene with bttn requesting a signed order for diabetic strips and lancets with the testing frequency.

## 2022-09-28 ENCOUNTER — TELEPHONE (OUTPATIENT)
Dept: FAMILY MEDICINE | Facility: CLINIC | Age: 77
End: 2022-09-28
Payer: MEDICARE

## 2022-09-28 NOTE — TELEPHONE ENCOUNTER
----- Message from Kit Aguero sent at 9/28/2022  9:58 AM CDT -----  Contact: Jaylene/[employer]  Type: Needs Medical Advice  Who Called:  Jaylene/[employer]   Best Call Back Number: [phone]  Additional Information:  Still trying to get orders for diabetic supplies: Glucometer, strips, lancets. Needs orders with testing frequency [phone]

## 2022-09-28 NOTE — TELEPHONE ENCOUNTER
Left vm faxed 3 times    ----- Message from Kit Aguero sent at 9/28/2022  9:58 AM CDT -----  Contact: Jaylene/[employer]  Type: Needs Medical Advice  Who Called:  Jaylene/-[employer]   Best Call Back Number: [phone]  Additional Information:  Still trying to get orders for diabetic supplies: Glucometer, strips, lancets. Needs orders with testing frequency [phone]

## 2022-11-14 ENCOUNTER — TELEPHONE (OUTPATIENT)
Dept: FAMILY MEDICINE | Facility: CLINIC | Age: 77
End: 2022-11-14
Payer: MEDICARE

## 2022-11-14 NOTE — TELEPHONE ENCOUNTER
Done    ----- Message from Kit Aguero sent at 11/14/2022  1:28 PM CST -----  Contact: Self  Type: Needs Medical Advice  Who Called:  Patient    Best Call Back Number: [phone]   Additional Information:  Called to have 6/24 CT/ notes sent to Dr Ramos in [facility]

## 2022-12-08 LAB
ALBUMIN SERPL-MCNC: 4.2 G/DL (ref 3.6–5.1)
ALBUMIN/GLOB SERPL: 1.8 (CALC) (ref 1–2.5)
ALP SERPL-CCNC: 67 U/L (ref 35–144)
ALT SERPL-CCNC: 40 U/L (ref 9–46)
AST SERPL-CCNC: 12 U/L (ref 10–35)
BASOPHILS # BLD AUTO: 53 CELLS/UL (ref 0–200)
BASOPHILS NFR BLD AUTO: 0.5 %
BILIRUB SERPL-MCNC: 0.5 MG/DL (ref 0.2–1.2)
BUN SERPL-MCNC: 17 MG/DL (ref 7–25)
BUN/CREAT SERPL: ABNORMAL (CALC) (ref 6–22)
CALCIUM SERPL-MCNC: 9.1 MG/DL (ref 8.6–10.3)
CHLORIDE SERPL-SCNC: 98 MMOL/L (ref 98–110)
CO2 SERPL-SCNC: 34 MMOL/L (ref 20–32)
CREAT SERPL-MCNC: 0.82 MG/DL (ref 0.7–1.28)
EGFR: 90 ML/MIN/1.73M2
EOSINOPHIL # BLD AUTO: 265 CELLS/UL (ref 15–500)
EOSINOPHIL NFR BLD AUTO: 2.5 %
ERYTHROCYTE [DISTWIDTH] IN BLOOD BY AUTOMATED COUNT: 12.3 % (ref 11–15)
GLOBULIN SER CALC-MCNC: 2.4 G/DL (CALC) (ref 1.9–3.7)
GLUCOSE SERPL-MCNC: 245 MG/DL (ref 65–99)
HBA1C MFR BLD: 8.4 % OF TOTAL HGB
HCT VFR BLD AUTO: 48.4 % (ref 38.5–50)
HGB BLD-MCNC: 16 G/DL (ref 13.2–17.1)
LYMPHOCYTES # BLD AUTO: 2131 CELLS/UL (ref 850–3900)
LYMPHOCYTES NFR BLD AUTO: 20.1 %
MCH RBC QN AUTO: 31.6 PG (ref 27–33)
MCHC RBC AUTO-ENTMCNC: 33.1 G/DL (ref 32–36)
MCV RBC AUTO: 95.7 FL (ref 80–100)
MONOCYTES # BLD AUTO: 1028 CELLS/UL (ref 200–950)
MONOCYTES NFR BLD AUTO: 9.7 %
NEUTROPHILS # BLD AUTO: 7123 CELLS/UL (ref 1500–7800)
NEUTROPHILS NFR BLD AUTO: 67.2 %
PLATELET # BLD AUTO: 235 THOUSAND/UL (ref 140–400)
PMV BLD REES-ECKER: 10.1 FL (ref 7.5–12.5)
POTASSIUM SERPL-SCNC: 4.9 MMOL/L (ref 3.5–5.3)
PROT SERPL-MCNC: 6.6 G/DL (ref 6.1–8.1)
RBC # BLD AUTO: 5.06 MILLION/UL (ref 4.2–5.8)
SODIUM SERPL-SCNC: 139 MMOL/L (ref 135–146)
WBC # BLD AUTO: 10.6 THOUSAND/UL (ref 3.8–10.8)

## 2022-12-13 ENCOUNTER — OFFICE VISIT (OUTPATIENT)
Dept: FAMILY MEDICINE | Facility: CLINIC | Age: 77
End: 2022-12-13
Payer: MEDICARE

## 2022-12-13 VITALS
HEIGHT: 70 IN | WEIGHT: 240 LBS | SYSTOLIC BLOOD PRESSURE: 130 MMHG | BODY MASS INDEX: 34.36 KG/M2 | OXYGEN SATURATION: 95 % | RESPIRATION RATE: 18 BRPM | TEMPERATURE: 98 F | DIASTOLIC BLOOD PRESSURE: 70 MMHG | HEART RATE: 59 BPM

## 2022-12-13 DIAGNOSIS — E78.5 HYPERLIPIDEMIA, UNSPECIFIED HYPERLIPIDEMIA TYPE: ICD-10-CM

## 2022-12-13 DIAGNOSIS — R35.1 NOCTURIA: ICD-10-CM

## 2022-12-13 DIAGNOSIS — E11.42 TYPE 2 DIABETES MELLITUS WITH DIABETIC POLYNEUROPATHY, WITH LONG-TERM CURRENT USE OF INSULIN: ICD-10-CM

## 2022-12-13 DIAGNOSIS — I10 HYPERTENSION, ESSENTIAL: Primary | ICD-10-CM

## 2022-12-13 DIAGNOSIS — Z12.5 PROSTATE CANCER SCREENING: ICD-10-CM

## 2022-12-13 DIAGNOSIS — G70.00 MYASTHENIA GRAVIS: ICD-10-CM

## 2022-12-13 DIAGNOSIS — N18.31 STAGE 3A CHRONIC KIDNEY DISEASE: ICD-10-CM

## 2022-12-13 DIAGNOSIS — Z79.4 TYPE 2 DIABETES MELLITUS WITH DIABETIC POLYNEUROPATHY, WITH LONG-TERM CURRENT USE OF INSULIN: ICD-10-CM

## 2022-12-13 DIAGNOSIS — R15.9 INCONTINENCE OF FECES, UNSPECIFIED FECAL INCONTINENCE TYPE: ICD-10-CM

## 2022-12-13 DIAGNOSIS — M54.9 DORSALGIA, UNSPECIFIED: ICD-10-CM

## 2022-12-13 DIAGNOSIS — G12.20 MOTOR NEURON DISEASE, UNSPECIFIED: ICD-10-CM

## 2022-12-13 PROCEDURE — 99499 RISK ADDL DX/OHS AUDIT: ICD-10-PCS | Mod: S$GLB,,, | Performed by: FAMILY MEDICINE

## 2022-12-13 PROCEDURE — 3078F PR MOST RECENT DIASTOLIC BLOOD PRESSURE < 80 MM HG: ICD-10-PCS | Mod: CPTII,S$GLB,, | Performed by: FAMILY MEDICINE

## 2022-12-13 PROCEDURE — 3075F SYST BP GE 130 - 139MM HG: CPT | Mod: CPTII,S$GLB,, | Performed by: FAMILY MEDICINE

## 2022-12-13 PROCEDURE — 1159F MED LIST DOCD IN RCRD: CPT | Mod: CPTII,S$GLB,, | Performed by: FAMILY MEDICINE

## 2022-12-13 PROCEDURE — 1126F AMNT PAIN NOTED NONE PRSNT: CPT | Mod: CPTII,S$GLB,, | Performed by: FAMILY MEDICINE

## 2022-12-13 PROCEDURE — 99214 OFFICE O/P EST MOD 30 MIN: CPT | Mod: S$GLB,,, | Performed by: FAMILY MEDICINE

## 2022-12-13 PROCEDURE — 1160F RVW MEDS BY RX/DR IN RCRD: CPT | Mod: CPTII,S$GLB,, | Performed by: FAMILY MEDICINE

## 2022-12-13 PROCEDURE — 1160F PR REVIEW ALL MEDS BY PRESCRIBER/CLIN PHARMACIST DOCUMENTED: ICD-10-PCS | Mod: CPTII,S$GLB,, | Performed by: FAMILY MEDICINE

## 2022-12-13 PROCEDURE — 1159F PR MEDICATION LIST DOCUMENTED IN MEDICAL RECORD: ICD-10-PCS | Mod: CPTII,S$GLB,, | Performed by: FAMILY MEDICINE

## 2022-12-13 PROCEDURE — 99214 PR OFFICE/OUTPT VISIT, EST, LEVL IV, 30-39 MIN: ICD-10-PCS | Mod: S$GLB,,, | Performed by: FAMILY MEDICINE

## 2022-12-13 PROCEDURE — 3078F DIAST BP <80 MM HG: CPT | Mod: CPTII,S$GLB,, | Performed by: FAMILY MEDICINE

## 2022-12-13 PROCEDURE — 3075F PR MOST RECENT SYSTOLIC BLOOD PRESS GE 130-139MM HG: ICD-10-PCS | Mod: CPTII,S$GLB,, | Performed by: FAMILY MEDICINE

## 2022-12-13 PROCEDURE — 1126F PR PAIN SEVERITY QUANTIFIED, NO PAIN PRESENT: ICD-10-PCS | Mod: CPTII,S$GLB,, | Performed by: FAMILY MEDICINE

## 2022-12-13 PROCEDURE — 99499 UNLISTED E&M SERVICE: CPT | Mod: S$GLB,,, | Performed by: FAMILY MEDICINE

## 2022-12-13 RX ORDER — HYDROCHLOROTHIAZIDE 12.5 MG/1
12.5 TABLET ORAL DAILY
Qty: 90 TABLET | Refills: 1 | Status: SHIPPED | OUTPATIENT
Start: 2022-12-13 | End: 2023-06-05

## 2022-12-13 RX ORDER — HYDROCHLOROTHIAZIDE 12.5 MG/1
12.5 TABLET ORAL DAILY
COMMUNITY
End: 2022-12-13 | Stop reason: SDUPTHER

## 2022-12-13 RX ORDER — AMLODIPINE BESYLATE 5 MG/1
10 TABLET ORAL DAILY
Qty: 90 TABLET | Refills: 1 | Status: SHIPPED | OUTPATIENT
Start: 2022-12-13 | End: 2023-05-22

## 2022-12-13 NOTE — PROGRESS NOTES
"Subjective:       Patient ID: Fazal Wade is a 77 y.o. male.    Chief Complaint: Follow-up    Patient presents to clinic for follow up. Myasthenia gravis. Urinary and fecal incontinence. Nocturia 2-3 times per night. He did not mention this to neurologist. States younger brother also has myasthenia gravis and has similar symptoms. Denies back pain or leg weakness. Neurologist ordered neuromuscular conjunction tests. Hypertension. Blood pressure slightly elevated. Not checking blood pressure regularly. Cardiovascular ok. Denies chest pain or palpitations. Aspirin use. Pedal edema. Decrease Amlodipine to 5 mg. Will add diuretic. Hyperlipidemia. Type 2 DM with polyneuropathy. A1C 8.4 . States he did not take insulin this day. 30 U insulin BID per patient. Was using 100 U last week but states he has been "hypoglycemic of 100 at home". Taking first shot on insulin at 3:30 pm and in the morning when he wakes up.  No longer taking Farxiga. Says he tries to avoid sugar and carbohydrates. BMI 34. Instructed to take 60 U in the morning and 40 U in the afternoon. Eye exam due, no eye exam for 2 years. Person history of shingles. Intermittent left leg itching. Denies rash. He states it may be flea bites from his dog. Dropped concrete block on his foot 3 weeks ago. No pain with ambulation or weight bearing. Due for tetanus, shingles, pneumonia, Covid booster, and influenza. Recommended Bivalent in place of booster. Declining vaccinations at this time.   Review of Systems   Respiratory: Negative.     Cardiovascular: Negative.  Negative for chest pain and palpitations.   Gastrointestinal:  Positive for fecal incontinence.   Genitourinary:  Positive for bladder incontinence.        Nocturia    Musculoskeletal:  Negative for back pain and leg pain.   Neurological:  Negative for weakness and numbness.   Hematological: Negative.    Psychiatric/Behavioral: Negative.         Objective:      Physical Exam  Constitutional:  "      Appearance: Normal appearance.   Neck:      Vascular: No carotid bruit.   Cardiovascular:      Rate and Rhythm: Normal rate and regular rhythm.      Pulses: Normal pulses.      Heart sounds: Normal heart sounds.   Pulmonary:      Effort: Pulmonary effort is normal.      Breath sounds: Normal breath sounds.   Lymphadenopathy:      Cervical: No cervical adenopathy.   Skin:     General: Skin is warm and dry.      Capillary Refill: Capillary refill takes 2 to 3 seconds.   Neurological:      Mental Status: He is alert.   Psychiatric:         Mood and Affect: Mood normal.         Behavior: Behavior normal.       Assessment/Plan:     Hypertension, essential  -     Comprehensive Metabolic Panel; Future; Expected date: 04/03/2023    Type 2 diabetes mellitus with diabetic polyneuropathy, with long-term current use of insulin  -     Hemoglobin A1C; Future; Expected date: 04/03/2023  -     Microalbumin/Creatinine Ratio, Urine; Future; Expected date: 04/03/2023    BMI 34.0-34.9,adult    Myasthenia gravis  -     CBC Auto Differential; Future; Expected date: 04/03/2023    Stage 3a chronic kidney disease    Nocturia  -     US Bladder; Future; Expected date: 12/13/2022  -     X-Ray Lumbar Spine 5 View; Future; Expected date: 12/13/2022    Incontinence of feces, unspecified fecal incontinence type  -     X-Ray Lumbar Spine 5 View; Future; Expected date: 12/13/2022    Prostate cancer screening  -     PSA, Screening; Future; Expected date: 04/03/2023    Hyperlipidemia, unspecified hyperlipidemia type  -     Lipid Panel; Future; Expected date: 04/03/2023    Motor neuron disease, unspecified    Other orders  -     hydroCHLOROthiazide (HYDRODIURIL) 12.5 MG Tab; Take 1 tablet (12.5 mg total) by mouth once daily.  Dispense: 90 tablet; Refill: 1  -     amLODIPine (NORVASC) 5 MG tablet; Take 2 tablets (10 mg total) by mouth once daily.  Dispense: 90 tablet; Refill: 1     Declines flu shot.  Will add hydrochlorothiazide for his  hypertension.  And decrease the amlodipine from 10-5 mg.  This should help his pedal edema.  Follow-up CBC CMP lipids A1c and PSA in 4 months.  Recommend shingles vaccine.  Go for diabetic eye exam.  Has not been for 2 years.  Recommend he try 60 units in the morning and 40 units in the evening of his Humulin in use this consistently and give us some blood sugar so I can adjusted.  Watch diet closely.  Get an ultrasound for residual urine due to his urinary incontinence.  Also due to the urinary and fecal incontinence.  Get a lumbar spine film and get MRI of the lumbar spine.

## 2022-12-16 ENCOUNTER — PES CALL (OUTPATIENT)
Dept: ADMINISTRATIVE | Facility: CLINIC | Age: 77
End: 2022-12-16
Payer: MEDICARE

## 2022-12-30 ENCOUNTER — HOSPITAL ENCOUNTER (OUTPATIENT)
Dept: RADIOLOGY | Facility: HOSPITAL | Age: 77
Discharge: HOME | End: 2022-12-30
Attending: FAMILY MEDICINE
Payer: MEDICARE

## 2022-12-30 DIAGNOSIS — R15.9 INCONTINENCE OF FECES, UNSPECIFIED FECAL INCONTINENCE TYPE: ICD-10-CM

## 2022-12-30 DIAGNOSIS — M54.9 DORSALGIA, UNSPECIFIED: ICD-10-CM

## 2022-12-30 DIAGNOSIS — R35.1 NOCTURIA: ICD-10-CM

## 2022-12-30 DIAGNOSIS — G12.20 MOTOR NEURON DISEASE, UNSPECIFIED: ICD-10-CM

## 2022-12-30 PROCEDURE — 72110 X-RAY EXAM L-2 SPINE 4/>VWS: CPT | Mod: TC,PO

## 2022-12-30 PROCEDURE — 76857 US EXAM PELVIC LIMITED: CPT | Mod: TC,PO

## 2022-12-30 PROCEDURE — 72148 MRI LUMBAR SPINE W/O DYE: CPT | Mod: TC,PO

## 2023-01-03 DIAGNOSIS — M54.9 BACK PAIN, UNSPECIFIED BACK LOCATION, UNSPECIFIED BACK PAIN LATERALITY, UNSPECIFIED CHRONICITY: Primary | ICD-10-CM

## 2023-01-03 DIAGNOSIS — R35.1 NOCTURIA: ICD-10-CM

## 2023-01-03 DIAGNOSIS — R15.9 INCONTINENCE OF FECES, UNSPECIFIED FECAL INCONTINENCE TYPE: ICD-10-CM

## 2023-01-03 RX ORDER — TAMSULOSIN HYDROCHLORIDE 0.4 MG/1
0.4 CAPSULE ORAL DAILY
Qty: 90 CAPSULE | Refills: 1 | Status: CANCELLED | OUTPATIENT
Start: 2023-01-03 | End: 2024-01-03

## 2023-01-03 NOTE — TELEPHONE ENCOUNTER
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  Send him to pain management to look into epidural injection.  ABNORMAL refer to specialist for further evaluation

## 2023-01-03 NOTE — TELEPHONE ENCOUNTER
No new care gaps identified.  [facility] Embedded Care Gaps. Reference number: [id number]. 1/03/2023   9:54:05 AM CST

## 2023-01-04 ENCOUNTER — HOSPITAL ENCOUNTER (OUTPATIENT)
Dept: RADIOLOGY | Facility: HOSPITAL | Age: 78
Discharge: HOME | End: 2023-01-04
Attending: FAMILY MEDICINE
Payer: MEDICARE

## 2023-01-04 DIAGNOSIS — R15.9 INCONTINENCE OF FECES, UNSPECIFIED FECAL INCONTINENCE TYPE: ICD-10-CM

## 2023-01-04 PROCEDURE — 74019 RADEX ABDOMEN 2 VIEWS: CPT | Mod: TC,PO

## 2023-01-06 LAB
LEFT EYE DM RETINOPATHY: NEGATIVE
RIGHT EYE DM RETINOPATHY: NEGATIVE

## 2023-01-09 ENCOUNTER — TELEPHONE (OUTPATIENT)
Dept: GASTROENTEROLOGY | Facility: CLINIC | Age: 78
End: 2023-01-09
Payer: MEDICARE

## 2023-01-31 ENCOUNTER — OFFICE VISIT (OUTPATIENT)
Dept: GASTROENTEROLOGY | Facility: CLINIC | Age: 78
End: 2023-01-31
Payer: MEDICARE

## 2023-01-31 ENCOUNTER — TELEPHONE (OUTPATIENT)
Dept: GASTROENTEROLOGY | Facility: CLINIC | Age: 78
End: 2023-01-31
Payer: MEDICARE

## 2023-01-31 VITALS
DIASTOLIC BLOOD PRESSURE: 74 MMHG | BODY MASS INDEX: 33.62 KG/M2 | WEIGHT: 234.81 LBS | SYSTOLIC BLOOD PRESSURE: 158 MMHG | HEIGHT: 70 IN | HEART RATE: 58 BPM

## 2023-01-31 DIAGNOSIS — F41.9 ANXIETY: ICD-10-CM

## 2023-01-31 DIAGNOSIS — Z87.19 HISTORY OF HEMORRHOIDS: ICD-10-CM

## 2023-01-31 DIAGNOSIS — Z87.19 HISTORY OF ULCERATIVE COLITIS: ICD-10-CM

## 2023-01-31 DIAGNOSIS — R19.8 IRREGULAR BOWEL HABITS: ICD-10-CM

## 2023-01-31 DIAGNOSIS — F43.9 STRESS: ICD-10-CM

## 2023-01-31 DIAGNOSIS — R15.2 FECAL URGENCY: ICD-10-CM

## 2023-01-31 DIAGNOSIS — R15.9 FULL INCONTINENCE OF FECES: Primary | ICD-10-CM

## 2023-01-31 PROCEDURE — 3077F SYST BP >= 140 MM HG: CPT | Mod: CPTII,S$GLB,,

## 2023-01-31 PROCEDURE — 3077F PR MOST RECENT SYSTOLIC BLOOD PRESSURE >= 140 MM HG: ICD-10-PCS | Mod: CPTII,S$GLB,,

## 2023-01-31 PROCEDURE — 3078F PR MOST RECENT DIASTOLIC BLOOD PRESSURE < 80 MM HG: ICD-10-PCS | Mod: CPTII,S$GLB,,

## 2023-01-31 PROCEDURE — 1126F PR PAIN SEVERITY QUANTIFIED, NO PAIN PRESENT: ICD-10-PCS | Mod: CPTII,S$GLB,,

## 2023-01-31 PROCEDURE — 99999 PR PBB SHADOW E&M-EST. PATIENT-LVL V: CPT | Mod: PBBFAC,,,

## 2023-01-31 PROCEDURE — 1126F AMNT PAIN NOTED NONE PRSNT: CPT | Mod: CPTII,S$GLB,,

## 2023-01-31 PROCEDURE — 99999 PR PBB SHADOW E&M-EST. PATIENT-LVL V: ICD-10-PCS | Mod: PBBFAC,,,

## 2023-01-31 PROCEDURE — 3288F FALL RISK ASSESSMENT DOCD: CPT | Mod: CPTII,S$GLB,,

## 2023-01-31 PROCEDURE — 1159F MED LIST DOCD IN RCRD: CPT | Mod: CPTII,S$GLB,,

## 2023-01-31 PROCEDURE — 3288F PR FALLS RISK ASSESSMENT DOCUMENTED: ICD-10-PCS | Mod: CPTII,S$GLB,,

## 2023-01-31 PROCEDURE — 99499 UNLISTED E&M SERVICE: CPT | Mod: S$GLB,,,

## 2023-01-31 PROCEDURE — 1101F PT FALLS ASSESS-DOCD LE1/YR: CPT | Mod: CPTII,S$GLB,,

## 2023-01-31 PROCEDURE — 1159F PR MEDICATION LIST DOCUMENTED IN MEDICAL RECORD: ICD-10-PCS | Mod: CPTII,S$GLB,,

## 2023-01-31 PROCEDURE — 1101F PR PT FALLS ASSESS DOC 0-1 FALLS W/OUT INJ PAST YR: ICD-10-PCS | Mod: CPTII,S$GLB,,

## 2023-01-31 PROCEDURE — 99203 PR OFFICE/OUTPT VISIT, NEW, LEVL III, 30-44 MIN: ICD-10-PCS | Mod: S$GLB,,,

## 2023-01-31 PROCEDURE — 99203 OFFICE O/P NEW LOW 30 MIN: CPT | Mod: S$GLB,,,

## 2023-01-31 PROCEDURE — 1160F RVW MEDS BY RX/DR IN RCRD: CPT | Mod: CPTII,S$GLB,,

## 2023-01-31 PROCEDURE — 3078F DIAST BP <80 MM HG: CPT | Mod: CPTII,S$GLB,,

## 2023-01-31 PROCEDURE — 99499 RISK ADDL DX/OHS AUDIT: ICD-10-PCS | Mod: S$GLB,,,

## 2023-01-31 PROCEDURE — 1160F PR REVIEW ALL MEDS BY PRESCRIBER/CLIN PHARMACIST DOCUMENTED: ICD-10-PCS | Mod: CPTII,S$GLB,,

## 2023-01-31 NOTE — TELEPHONE ENCOUNTER
----- Message from Heather Danielson sent at 1/31/2023  9:38 AM CST -----  Regarding: pt called  Name of Who is Calling:  Pt called         What is the request in detail:    The procedure he wanted to remember is call proctoscope. Please advise       Can the clinic reply by [facility]: no          What Number to Call Back if not in [facility]: [phone] (home)

## 2023-01-31 NOTE — PROGRESS NOTES
"Subjective:       Patient ID: Fazal Wade is a 77 y.o. male Body mass index is 33.69 kg/m².    Chief Complaint: Encopresis    This patient is new to me.  Referring Provider: Dr. Sylvester Lugo III for incontinence of feces.     GI Problem  The primary symptoms include weight loss (intentional; currently dieting to help improve diabetes) and diarrhea. Primary symptoms do not include fever, fatigue, abdominal pain, nausea, vomiting, melena, hematemesis, jaundice, hematochezia or dysuria.   The diarrhea began more than 1 week ago (Chronic; intermittent episodes of loose stool around times of stress, anxiety, & fecal incontinence). The diarrhea is semi-solid (Typically rated stool 4 on Dakota scale, but reports stools are like pudding"). Daily occurrences: Typically has 1 BM daily, but during episodes of incontinence reports having to have 5-6 BMs; wears diapers due to intermittent episodes of full incontinence and fecal urgency; reports metformin caused diarrhea in the past (discontinued) Risk factors: Denies suspect food, recent antibiotics, new medications, and traveling.   The illness does not include chills, anorexia, dysphagia, odynophagia, bloating or constipation (hx of constipation noted on recent KUB by Dr. Lugo; patient denies straining, but does not always feel empty after BMs). 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 Significant associated medical issues include inflammatory bowel disease (Patient reports history of UC diagnosed at birth that reoccurred between the ages of 12 and 17) and hemorrhoids (Currently asymptomatic). Associated medical issues do not include GERD, gallstones, liver disease, alcohol abuse, PUD, gastric bypass, bowel resection, irritable bowel syndrome or diverticulitis.     Review of Systems   Constitutional:  Positive for weight loss (intentional; currently dieting to help improve diabetes). Negative for activity change, appetite change, chills, diaphoresis, fatigue, fever and unexpected weight change.   HENT:  Negative for sore throat and trouble swallowing.    Respiratory:  Negative for cough, choking and shortness of breath.    Cardiovascular:  Negative for chest pain.   Gastrointestinal:  Positive for diarrhea. Negative for abdominal distention, abdominal pain, anal bleeding, anorexia, bloating, blood in stool, constipation (hx of constipation noted on recent KUB by Dr. Lugo; patient denies straining, but does not always feel empty after BMs), dysphagia, hematemesis, hematochezia, jaundice, melena, nausea, rectal pain and vomiting.   Genitourinary:  Negative for dysuria.      No LMP for male patient.  Past Medical History:   Diagnosis Date    Diabetes mellitus, type 2     Hyperlipidemia     Hypertension      Past Surgical History:   Procedure Laterality Date    COLONOSCOPY      EYE SURGERY       Family History   Problem Relation Age of Onset    No Known Problems Mother     Diabetes Father     Hypertension Father     Colon  cancer Neg Hx     Colon polyps Neg Hx     Crohn's disease Neg Hx     Esophageal cancer Neg Hx     Ulcerative colitis Neg Hx     Stomach cancer Neg Hx      Social History     Tobacco Use    Smoking status: Never    Smokeless tobacco: Never   Substance Use Topics    Alcohol use: Never    Drug use: Never     Wt Readings from Last 10 Encounters:   01/31/23 106.5 kg (234 lb 12.6 oz)   12/13/22 108.9 kg (240 lb)   09/13/22 98.2 kg (216 lb 6.4 oz)   06/13/22 106.9 kg (235 lb 9.6 oz)   04/12/22 102.1 kg (225 lb)   03/08/22 101.2 kg (223 lb)   02/17/22 99.8 kg (220 lb)   02/07/22 100.2 kg (221 lb)   08/25/21 99.8 kg (220 lb)   08/19/21 102.1 kg (225 lb 1.4 oz)     Lab Results   Component Value Date    WBC 10.6 12/07/2022    HGB 16.0 12/07/2022    HCT 48.4 12/07/2022    MCV 95.7 12/07/2022     12/07/2022     CMP  Sodium   Date Value Ref Range Status   12/07/2022 139 135 - 146 mmol/L Final   06/17/2019 139 134 - 144 mmol/L      Potassium   Date Value Ref Range Status   12/07/2022 4.9 3.5 - 5.3 mmol/L Final     Chloride   Date Value Ref Range Status   12/07/2022 98 98 - 110 mmol/L Final   06/17/2019 97 (L) 98 - 110 mmol/L      CO2   Date Value Ref Range Status   12/07/2022 34 (H) 20 - 32 mmol/L Final     Glucose   Date Value Ref Range Status   12/07/2022 245 (H) 65 - 99 mg/dL Final     Comment:                   Fasting reference interval     For someone without known diabetes, a glucose  value >125 mg/dL indicates that they may have  diabetes and this should be confirmed with a  follow-up test.        06/17/2019 246 (H) 70 - 99 mg/dL      BUN   Date Value Ref Range Status   12/07/2022 17 7 - 25 mg/dL Final     Creatinine   Date Value Ref Range Status   12/07/2022 0.82 0.70 - 1.28 mg/dL Final   06/17/2019 1.16 0.60 - 1.40 mg/dL      Calcium   Date Value Ref Range Status   12/07/2022 9.1 8.6 - 10.3 mg/dL Final     Total Protein   Date Value Ref Range Status   12/07/2022 6.6 6.1 - 8.1 g/dL Final     Albumin   Date Value  Ref Range Status   12/07/2022 4.2 3.6 - 5.1 g/dL Final   06/17/2019 3.7 3.1 - 4.7 g/dL      Total Bilirubin   Date Value Ref Range Status   12/07/2022 0.5 0.2 - 1.2 mg/dL Final     Alkaline Phosphatase   Date Value Ref Range Status   08/23/2021 44 (L) 55 - 135 U/L Final     AST   Date Value Ref Range Status   12/07/2022 12 10 - 35 U/L Final     ALT   Date Value Ref Range Status   12/07/2022 40 9 - 46 U/L Final     Anion Gap   Date Value Ref Range Status   08/23/2021 11 8 - 16 mmol/L Final     eGFR if    Date Value Ref Range Status   06/08/2022 97 > OR = 60 mL/min/1.73m2 Final     eGFR if non    Date Value Ref Range Status   06/08/2022 84 > OR = 60 mL/min/1.73m2 Final     Lab Results   Component Value Date    LIPASE 116 (H) 01/05/2020     Lab Results   Component Value Date    TSH 0.601 02/01/2021     Reviewed prior medical records including radiology report of KUB 01/04/2023 & endoscopy history (see surgical history).    Objective:      Physical Exam  Vitals and nursing note reviewed.   Constitutional:       General: He is not in acute distress.     Appearance: Normal appearance. He is obese. He is not ill-appearing.   HENT:      Mouth/Throat:      Lips: Pink. No lesions.   Cardiovascular:      Rate and Rhythm: Normal rate and regular rhythm.      Pulses: Normal pulses.   Pulmonary:      Effort: Pulmonary effort is normal. No respiratory distress.   Abdominal:      General: Abdomen is protuberant. Bowel sounds are normal. There is no distension or abdominal bruit. There are no signs of injury.      Palpations: Abdomen is soft. There is no shifting dullness, fluid wave, hepatomegaly, splenomegaly or mass.      Tenderness: There is no abdominal tenderness. There is no guarding or rebound. Negative signs include Monroy's sign, Rovsing's sign and McBurney's sign.      Hernia: No hernia is present.   Skin:     General: Skin is warm and dry.      Coloration: Skin is not jaundiced or pale.    Neurological:      Mental Status: He is alert and oriented to person, place, and time.   Psychiatric:         Attention and Perception: Attention normal.         Mood and Affect: Mood normal.         Speech: Speech normal.         Behavior: Behavior normal.       Assessment:       1. Full incontinence of feces    2. Fecal urgency    3. Irregular bowel habits    4. History of ulcerative colitis    5. History of hemorrhoids    6. Stress    7. Anxiety        Plan:       Full incontinence of feces & Fecal urgency  - recommend OTC probiotic, such as Florastor or Culturelle, taken as directed on packaging  - avoid lactose, alcohol, & caffeine  - avoid known triggers  -Recommend high fiber diet (20-30 grams of fiber daily)/OTC fiber supplements daily as directed.  - discussed about Colonoscopy, including the risks and benefits of colonoscopy, patient verbalized understanding but patient declined colonoscopy.  -     Ambulatory referral/consult to Physical/Occupational Therapy; Future; Expected date: 02/07/2023    Irregular bowel habits  - discussed about Colonoscopy, including the risks and benefits of colonoscopy, patient verbalized understanding but patient declined colonoscopy.  - recommend OTC probiotic, such as Florastor or Culturelle, taken as directed on packaging  - avoid lactose, alcohol, & caffeine  - avoid known triggers  -     H. pylori antigen, stool; Future; Expected date: 01/31/2023  -     Pancreatic elastase, fecal; Future; Expected date: 01/31/2023  -     WBC, Stool; Future; Expected date: 01/31/2023  -     Rotavirus antigen, stool; Future; Expected date: 01/31/2023  -     Adenovirus Molecular Detection, PCR, Non-Blood Stool; Future; Expected date: 01/31/2023  -     Calprotectin, Stool; Future; Expected date: 01/31/2023  -     Giardia / Cryptosporidum, EIA; Future; Expected date: 01/31/2023  -     Stool Exam-Ova,Cysts,Parasites; Future; Expected date: 01/31/2023  -     Clostridium difficile EIA; Future;  Expected date: 01/31/2023  -     Stool culture; Future; Expected date: 01/31/2023  -     Ambulatory referral/consult to Physical/Occupational Therapy; Future; Expected date: 02/07/2023    History of ulcerative colitis  - discussed about Colonoscopy, including the risks and benefits of colonoscopy, patient verbalized understanding but patient declined colonoscopy.  -     Calprotectin, Stool; Future; Expected date: 01/31/2023  -     Comprehensive Metabolic Panel; Future; Expected date: 01/31/2023  -     C-reactive protein; Future; Expected date: 01/31/2023    History of hemorrhoids  -currently asymptomatic    Stress & Anxiety  -follow-up with PCP for continued evaluation and management  -follow-up with PCP to discuss CBD gummies as a treatment for stress and anxiety  -consider referral to psychology    Follow up in about 4 weeks (around 2/28/2023), or if symptoms worsen or fail to improve.      If no improvement in symptoms or symptoms worsen, call/follow-up at clinic or go to ER.        30 minutes of total time spent on the encounter, which includes face to face time and non-face to face time preparing to see the patient (eg, review of tests), Obtaining and/or reviewing separately obtained history, Documenting clinical information in the electronic or other health record, Independently interpreting results (not separately reported) and communicating results to the patient/family/caregiver, or Care coordination (not separately reported).     A dictation software program was used for this note. Please expect some simple typographical  errors in this note.     Patient/Caregiver provided printed discharge information.

## 2023-02-07 ENCOUNTER — LAB VISIT (OUTPATIENT)
Dept: LAB | Facility: HOSPITAL | Age: 78
End: 2023-02-07
Payer: MEDICARE

## 2023-02-07 DIAGNOSIS — Z87.19 HISTORY OF ULCERATIVE COLITIS: ICD-10-CM

## 2023-02-07 DIAGNOSIS — R19.8 IRREGULAR BOWEL HABITS: ICD-10-CM

## 2023-02-07 LAB
C DIFF GDH STL QL: NEGATIVE
C DIFF TOX A+B STL QL IA: NEGATIVE
RV AG STL QL IA.RAPID: NEGATIVE
WBC #/AREA STL HPF: NORMAL /[HPF]

## 2023-02-07 PROCEDURE — 87449 NOS EACH ORGANISM AG IA: CPT

## 2023-02-07 PROCEDURE — 83993 ASSAY FOR CALPROTECTIN FECAL: CPT

## 2023-02-07 PROCEDURE — 82653 EL-1 FECAL QUANTITATIVE: CPT

## 2023-02-07 PROCEDURE — 89055 LEUKOCYTE ASSESSMENT FECAL: CPT

## 2023-02-07 PROCEDURE — 87427 SHIGA-LIKE TOXIN AG IA: CPT

## 2023-02-07 PROCEDURE — 87177 OVA AND PARASITES SMEARS: CPT

## 2023-02-07 PROCEDURE — 87338 HPYLORI STOOL AG IA: CPT

## 2023-02-07 PROCEDURE — 87209 SMEAR COMPLEX STAIN: CPT

## 2023-02-07 PROCEDURE — 87425 ROTAVIRUS AG IA: CPT

## 2023-02-07 PROCEDURE — 87045 FECES CULTURE AEROBIC BACT: CPT

## 2023-02-07 PROCEDURE — 87798 DETECT AGENT NOS DNA AMP: CPT

## 2023-02-07 PROCEDURE — 87046 STOOL CULTR AEROBIC BACT EA: CPT | Mod: 59

## 2023-02-07 PROCEDURE — 87329 GIARDIA AG IA: CPT

## 2023-02-08 LAB
CRYPTOSP AG STL QL IA: NEGATIVE
G LAMBLIA AG STL QL IA: NEGATIVE

## 2023-02-09 LAB
E COLI SXT1 STL QL IA: NEGATIVE
E COLI SXT2 STL QL IA: NEGATIVE
HADV DNA SERPL QL NAA+PROBE: NEGATIVE
SPECIMEN SOURCE: NORMAL

## 2023-02-10 DIAGNOSIS — K86.89 PANCREATIC INSUFFICIENCY: Primary | ICD-10-CM

## 2023-02-10 LAB — ELASTASE 1, FECAL: 145 MCG/G

## 2023-02-10 RX ORDER — PANCRELIPASE 24000; 76000; 120000 [USP'U]/1; [USP'U]/1; [USP'U]/1
1 CAPSULE, DELAYED RELEASE PELLETS ORAL SEE ADMIN INSTRUCTIONS
Qty: 120 CAPSULE | Refills: 2 | Status: SHIPPED | OUTPATIENT
Start: 2023-02-10 | End: 2024-03-20

## 2023-02-11 LAB — BACTERIA STL CULT: NORMAL

## 2023-02-13 LAB
CALPROTECTIN STL-MCNT: 28.6 MCG/G
O+P STL MICRO: NORMAL

## 2023-02-16 LAB
H PYLORI AG STL QL IA: NOT DETECTED
SPECIMEN SOURCE: NORMAL

## 2023-03-28 ENCOUNTER — TELEPHONE (OUTPATIENT)
Dept: ADMINISTRATIVE | Facility: OTHER | Age: 78
End: 2023-03-28
Payer: MEDICARE

## 2023-04-11 LAB
ALBUMIN SERPL-MCNC: 4.1 G/DL (ref 3.6–5.1)
ALBUMIN/GLOB SERPL: 1.6 (CALC) (ref 1–2.5)
ALP SERPL-CCNC: 60 U/L (ref 35–144)
ALT SERPL-CCNC: 12 U/L (ref 9–46)
AST SERPL-CCNC: 13 U/L (ref 10–35)
BASOPHILS # BLD AUTO: 49 CELLS/UL (ref 0–200)
BASOPHILS NFR BLD AUTO: 0.6 %
BILIRUB SERPL-MCNC: 0.5 MG/DL (ref 0.2–1.2)
BUN SERPL-MCNC: 18 MG/DL (ref 7–25)
BUN/CREAT SERPL: ABNORMAL (CALC) (ref 6–22)
CALCIUM SERPL-MCNC: 9.3 MG/DL (ref 8.6–10.3)
CHLORIDE SERPL-SCNC: 100 MMOL/L (ref 98–110)
CHOLEST SERPL-MCNC: 122 MG/DL
CHOLEST/HDLC SERPL: 3.8 (CALC)
CO2 SERPL-SCNC: 30 MMOL/L (ref 20–32)
CREAT SERPL-MCNC: 0.88 MG/DL (ref 0.7–1.28)
EGFR: 88 ML/MIN/1.73M2
EOSINOPHIL # BLD AUTO: 219 CELLS/UL (ref 15–500)
EOSINOPHIL NFR BLD AUTO: 2.7 %
ERYTHROCYTE [DISTWIDTH] IN BLOOD BY AUTOMATED COUNT: 12.6 % (ref 11–15)
GLOBULIN SER CALC-MCNC: 2.6 G/DL (CALC) (ref 1.9–3.7)
GLUCOSE SERPL-MCNC: 206 MG/DL (ref 65–99)
HBA1C MFR BLD: 8.6 % OF TOTAL HGB
HCT VFR BLD AUTO: 45.2 % (ref 38.5–50)
HDLC SERPL-MCNC: 32 MG/DL
HGB BLD-MCNC: 14.8 G/DL (ref 13.2–17.1)
LDLC SERPL CALC-MCNC: 73 MG/DL (CALC)
LYMPHOCYTES # BLD AUTO: 2179 CELLS/UL (ref 850–3900)
LYMPHOCYTES NFR BLD AUTO: 26.9 %
MCH RBC QN AUTO: 31.5 PG (ref 27–33)
MCHC RBC AUTO-ENTMCNC: 32.7 G/DL (ref 32–36)
MCV RBC AUTO: 96.2 FL (ref 80–100)
MONOCYTES # BLD AUTO: 713 CELLS/UL (ref 200–950)
MONOCYTES NFR BLD AUTO: 8.8 %
NEUTROPHILS # BLD AUTO: 4941 CELLS/UL (ref 1500–7800)
NEUTROPHILS NFR BLD AUTO: 61 %
NONHDLC SERPL-MCNC: 90 MG/DL (CALC)
PLATELET # BLD AUTO: 204 THOUSAND/UL (ref 140–400)
PMV BLD REES-ECKER: 10.2 FL (ref 7.5–12.5)
POTASSIUM SERPL-SCNC: 4.7 MMOL/L (ref 3.5–5.3)
PROT SERPL-MCNC: 6.7 G/DL (ref 6.1–8.1)
PSA SERPL-MCNC: 0.97 NG/ML
RBC # BLD AUTO: 4.7 MILLION/UL (ref 4.2–5.8)
SODIUM SERPL-SCNC: 139 MMOL/L (ref 135–146)
TRIGL SERPL-MCNC: 91 MG/DL
WBC # BLD AUTO: 8.1 THOUSAND/UL (ref 3.8–10.8)

## 2023-04-12 ENCOUNTER — TELEPHONE (OUTPATIENT)
Dept: FAMILY MEDICINE | Facility: CLINIC | Age: 78
End: 2023-04-12
Payer: MEDICARE

## 2023-04-12 DIAGNOSIS — Z79.4 TYPE 2 DIABETES MELLITUS WITH DIABETIC POLYNEUROPATHY, WITH LONG-TERM CURRENT USE OF INSULIN: Primary | ICD-10-CM

## 2023-04-12 DIAGNOSIS — E11.42 TYPE 2 DIABETES MELLITUS WITH DIABETIC POLYNEUROPATHY, WITH LONG-TERM CURRENT USE OF INSULIN: Primary | ICD-10-CM

## 2023-04-12 NOTE — TELEPHONE ENCOUNTER
Endo referral: spoke with patient. He is unaware of this need for an appointment. He has PCP appointment tomorrow and will discuss. Scheduling information via portal.

## 2023-04-25 ENCOUNTER — LAB VISIT (OUTPATIENT)
Dept: LAB | Facility: HOSPITAL | Age: 78
End: 2023-04-25
Attending: INTERNAL MEDICINE
Payer: MEDICARE

## 2023-04-25 ENCOUNTER — OFFICE VISIT (OUTPATIENT)
Dept: ENDOCRINOLOGY | Facility: CLINIC | Age: 78
End: 2023-04-25
Payer: MEDICARE

## 2023-04-25 VITALS
OXYGEN SATURATION: 96 % | SYSTOLIC BLOOD PRESSURE: 132 MMHG | TEMPERATURE: 98 F | BODY MASS INDEX: 33.49 KG/M2 | DIASTOLIC BLOOD PRESSURE: 60 MMHG | HEIGHT: 70 IN | HEART RATE: 52 BPM | WEIGHT: 233.94 LBS

## 2023-04-25 DIAGNOSIS — G47.30 SLEEP APNEA, UNSPECIFIED TYPE: ICD-10-CM

## 2023-04-25 DIAGNOSIS — E53.8 B12 DEFICIENCY: ICD-10-CM

## 2023-04-25 DIAGNOSIS — E55.9 HYPOVITAMINOSIS D: ICD-10-CM

## 2023-04-25 DIAGNOSIS — E11.42 TYPE 2 DIABETES MELLITUS WITH DIABETIC POLYNEUROPATHY, WITH LONG-TERM CURRENT USE OF INSULIN: ICD-10-CM

## 2023-04-25 DIAGNOSIS — E11.65 TYPE 2 DIABETES MELLITUS WITH HYPERGLYCEMIA, WITH LONG-TERM CURRENT USE OF INSULIN: Primary | ICD-10-CM

## 2023-04-25 DIAGNOSIS — I10 HYPERTENSION, ESSENTIAL: ICD-10-CM

## 2023-04-25 DIAGNOSIS — Z86.73 HISTORY OF STROKE: ICD-10-CM

## 2023-04-25 DIAGNOSIS — R94.6 THYROID FUNCTION TEST ABNORMAL: ICD-10-CM

## 2023-04-25 DIAGNOSIS — G70.00 MYASTHENIA GRAVIS: ICD-10-CM

## 2023-04-25 DIAGNOSIS — E66.9 OBESITY (BMI 30-39.9): ICD-10-CM

## 2023-04-25 DIAGNOSIS — Z79.4 TYPE 2 DIABETES MELLITUS WITH DIABETIC POLYNEUROPATHY, WITH LONG-TERM CURRENT USE OF INSULIN: ICD-10-CM

## 2023-04-25 DIAGNOSIS — Z79.4 TYPE 2 DIABETES MELLITUS WITH HYPERGLYCEMIA, WITH LONG-TERM CURRENT USE OF INSULIN: ICD-10-CM

## 2023-04-25 DIAGNOSIS — E78.5 HYPERLIPIDEMIA, UNSPECIFIED HYPERLIPIDEMIA TYPE: ICD-10-CM

## 2023-04-25 DIAGNOSIS — Z79.4 TYPE 2 DIABETES MELLITUS WITH HYPERGLYCEMIA, WITH LONG-TERM CURRENT USE OF INSULIN: Primary | ICD-10-CM

## 2023-04-25 DIAGNOSIS — E11.65 TYPE 2 DIABETES MELLITUS WITH HYPERGLYCEMIA, WITH LONG-TERM CURRENT USE OF INSULIN: ICD-10-CM

## 2023-04-25 DIAGNOSIS — N18.31 STAGE 3A CHRONIC KIDNEY DISEASE: ICD-10-CM

## 2023-04-25 LAB
AMYLASE SERPL-CCNC: 41 U/L (ref 20–110)
BILIRUB UR QL STRIP: NEGATIVE
CA-I BLDV-SCNC: 1.19 MMOL/L (ref 1.06–1.42)
CLARITY UR REFRACT.AUTO: CLEAR
COLOR UR AUTO: YELLOW
GLUCOSE UR QL STRIP: NEGATIVE
HGB UR QL STRIP: NEGATIVE
KETONES UR QL STRIP: NEGATIVE
LEUKOCYTE ESTERASE UR QL STRIP: NEGATIVE
LIPASE SERPL-CCNC: 14 U/L (ref 4–60)
NITRITE UR QL STRIP: NEGATIVE
PH UR STRIP: 7 [PH] (ref 5–8)
PROT UR QL STRIP: NEGATIVE
SP GR UR STRIP: 1.01 (ref 1–1.03)
TSH SERPL DL<=0.005 MIU/L-ACNC: 0.82 UIU/ML (ref 0.4–4)
URATE SERPL-MCNC: 4.9 MG/DL (ref 3.4–7)
URN SPEC COLLECT METH UR: NORMAL

## 2023-04-25 PROCEDURE — 82306 VITAMIN D 25 HYDROXY: CPT | Performed by: INTERNAL MEDICINE

## 2023-04-25 PROCEDURE — 99999 PR PBB SHADOW E&M-EST. PATIENT-LVL V: CPT | Mod: PBBFAC,,, | Performed by: INTERNAL MEDICINE

## 2023-04-25 PROCEDURE — 84244 ASSAY OF RENIN: CPT | Performed by: INTERNAL MEDICINE

## 2023-04-25 PROCEDURE — 84550 ASSAY OF BLOOD/URIC ACID: CPT | Performed by: INTERNAL MEDICINE

## 2023-04-25 PROCEDURE — 82746 ASSAY OF FOLIC ACID SERUM: CPT | Performed by: INTERNAL MEDICINE

## 2023-04-25 PROCEDURE — 82308 ASSAY OF CALCITONIN: CPT | Performed by: INTERNAL MEDICINE

## 2023-04-25 PROCEDURE — 82607 VITAMIN B-12: CPT | Performed by: INTERNAL MEDICINE

## 2023-04-25 PROCEDURE — 83970 ASSAY OF PARATHORMONE: CPT | Performed by: INTERNAL MEDICINE

## 2023-04-25 PROCEDURE — 84378 SUGARS SINGLE QUANT: CPT | Performed by: INTERNAL MEDICINE

## 2023-04-25 PROCEDURE — 3075F SYST BP GE 130 - 139MM HG: CPT | Mod: CPTII,S$GLB,, | Performed by: INTERNAL MEDICINE

## 2023-04-25 PROCEDURE — 99204 OFFICE O/P NEW MOD 45 MIN: CPT | Mod: S$GLB,,, | Performed by: INTERNAL MEDICINE

## 2023-04-25 PROCEDURE — 1101F PT FALLS ASSESS-DOCD LE1/YR: CPT | Mod: CPTII,S$GLB,, | Performed by: INTERNAL MEDICINE

## 2023-04-25 PROCEDURE — 82088 ASSAY OF ALDOSTERONE: CPT | Performed by: INTERNAL MEDICINE

## 2023-04-25 PROCEDURE — 81003 URINALYSIS AUTO W/O SCOPE: CPT | Performed by: INTERNAL MEDICINE

## 2023-04-25 PROCEDURE — 1159F PR MEDICATION LIST DOCUMENTED IN MEDICAL RECORD: ICD-10-PCS | Mod: CPTII,S$GLB,, | Performed by: INTERNAL MEDICINE

## 2023-04-25 PROCEDURE — 1159F MED LIST DOCD IN RCRD: CPT | Mod: CPTII,S$GLB,, | Performed by: INTERNAL MEDICINE

## 2023-04-25 PROCEDURE — 1101F PR PT FALLS ASSESS DOC 0-1 FALLS W/OUT INJ PAST YR: ICD-10-PCS | Mod: CPTII,S$GLB,, | Performed by: INTERNAL MEDICINE

## 2023-04-25 PROCEDURE — 1126F PR PAIN SEVERITY QUANTIFIED, NO PAIN PRESENT: ICD-10-PCS | Mod: CPTII,S$GLB,, | Performed by: INTERNAL MEDICINE

## 2023-04-25 PROCEDURE — 82150 ASSAY OF AMYLASE: CPT | Performed by: INTERNAL MEDICINE

## 2023-04-25 PROCEDURE — 3075F PR MOST RECENT SYSTOLIC BLOOD PRESS GE 130-139MM HG: ICD-10-PCS | Mod: CPTII,S$GLB,, | Performed by: INTERNAL MEDICINE

## 2023-04-25 PROCEDURE — 3078F PR MOST RECENT DIASTOLIC BLOOD PRESSURE < 80 MM HG: ICD-10-PCS | Mod: CPTII,S$GLB,, | Performed by: INTERNAL MEDICINE

## 2023-04-25 PROCEDURE — 82985 ASSAY OF GLYCATED PROTEIN: CPT | Performed by: INTERNAL MEDICINE

## 2023-04-25 PROCEDURE — 83690 ASSAY OF LIPASE: CPT | Performed by: INTERNAL MEDICINE

## 2023-04-25 PROCEDURE — 1160F PR REVIEW ALL MEDS BY PRESCRIBER/CLIN PHARMACIST DOCUMENTED: ICD-10-PCS | Mod: CPTII,S$GLB,, | Performed by: INTERNAL MEDICINE

## 2023-04-25 PROCEDURE — 3078F DIAST BP <80 MM HG: CPT | Mod: CPTII,S$GLB,, | Performed by: INTERNAL MEDICINE

## 2023-04-25 PROCEDURE — 99999 PR PBB SHADOW E&M-EST. PATIENT-LVL V: ICD-10-PCS | Mod: PBBFAC,,, | Performed by: INTERNAL MEDICINE

## 2023-04-25 PROCEDURE — 1160F RVW MEDS BY RX/DR IN RCRD: CPT | Mod: CPTII,S$GLB,, | Performed by: INTERNAL MEDICINE

## 2023-04-25 PROCEDURE — 1126F AMNT PAIN NOTED NONE PRSNT: CPT | Mod: CPTII,S$GLB,, | Performed by: INTERNAL MEDICINE

## 2023-04-25 PROCEDURE — 36415 COLL VENOUS BLD VENIPUNCTURE: CPT | Mod: PO | Performed by: INTERNAL MEDICINE

## 2023-04-25 PROCEDURE — 84443 ASSAY THYROID STIM HORMONE: CPT | Performed by: INTERNAL MEDICINE

## 2023-04-25 PROCEDURE — 99204 PR OFFICE/OUTPT VISIT, NEW, LEVL IV, 45-59 MIN: ICD-10-PCS | Mod: S$GLB,,, | Performed by: INTERNAL MEDICINE

## 2023-04-25 PROCEDURE — 83921 ORGANIC ACID SINGLE QUANT: CPT | Performed by: INTERNAL MEDICINE

## 2023-04-25 PROCEDURE — 82330 ASSAY OF CALCIUM: CPT | Performed by: INTERNAL MEDICINE

## 2023-04-25 PROCEDURE — 3288F FALL RISK ASSESSMENT DOCD: CPT | Mod: CPTII,S$GLB,, | Performed by: INTERNAL MEDICINE

## 2023-04-25 PROCEDURE — 3288F PR FALLS RISK ASSESSMENT DOCUMENTED: ICD-10-PCS | Mod: CPTII,S$GLB,, | Performed by: INTERNAL MEDICINE

## 2023-04-25 RX ORDER — MAGNESIUM 30 MG
TABLET ORAL ONCE
COMMUNITY

## 2023-04-25 RX ORDER — REPAGLINIDE 0.5 MG/1
0.5 TABLET ORAL
Qty: 270 TABLET | Refills: 3 | Status: SHIPPED | OUTPATIENT
Start: 2023-04-25 | End: 2023-08-04

## 2023-04-25 NOTE — PROGRESS NOTES
Subjective:      Patient ID: Fazal Wade is a 77 y.o. male.    Chief Complaint:  Diabetes    The patient is a 77 yr old gentleman seen for initial care visit today on account of presumed type 2 diabetes.    History of Present Illness    The patient , Mr Wade is a 77 yr old gentleman seen for the first time on account of type 2 diabetes. He had been referred by his PCP, Dr Sylvester Lugo.  His background comorbidities are detailed below;    1. Type 2 diabetes mellitus with hyperglycemia, with long-term current use of insulin        2. Type 2 diabetes mellitus with diabetic polyneuropathy, with long-term current use of insulin        3. Obesity (BMI 30-39.9)        4. Myasthenia gravis        5. History of stroke        6. Hypertension, essential        7. Hyperlipidemia, unspecified hyperlipidemia type        8. Stage 3a chronic kidney disease        9. B12 deficiency          His baseline [place] score is 5  He is presently on insulin monotherapy with NPH 50 units BID and most recent HBA1c is 8.6 from 04/23. He is on NPH because of cost issues.  Patient does SMBGS ~ 4 x daily.  These generally range in the 200-300s. Ocassionally he is in the 100-160 range.  He is very scared of having low glucose reactions.  Patient used to be on metformin and glyburide and this was stopped on account of fecal incontinence.  He does have sleep apnea but is not on active treatment for it.  Patient has been known to have diabetes for ~ 35 years.   He has a very erratic lifestyle and sleep habits. He also very erratic eating habits as well.  He continues to have fecal incontinence which is long standing.  He has found this is improved significantly by using CBD supplements.     Patient does not smoke.  Patient does not drink ETOH.  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  Patients ophthalmologic care is with [facility]. Last screening showed possible NPDR and  "early macular degeneration. (From ~ early this year). He is s/p bilateral cataract surgery.   He currently plays pool professionally presently.        Review of Systems   Constitutional:  Negative for activity change, appetite change, diaphoresis, fatigue and unexpected weight change.   HENT:  Negative for facial swelling, hearing loss, mouth sores, tinnitus, trouble swallowing and voice change.    Eyes:  Negative for photophobia, redness and visual disturbance.   Respiratory:  Negative for cough and shortness of breath.    Cardiovascular:  Negative for chest pain, palpitations and leg swelling.   Gastrointestinal:  Negative for abdominal distention, abdominal pain, constipation, diarrhea, nausea and vomiting.   Endocrine: Negative for cold intolerance, heat intolerance, polydipsia, polyphagia and polyuria (Does have nocturia.).   Genitourinary:  Negative for dysuria, flank pain, frequency, hematuria and urgency.   Musculoskeletal:  Negative for arthralgias, back pain, gait problem, joint swelling, myalgias and neck pain.   Skin:  Negative for color change, pallor and rash.   Neurological:  Negative for dizziness, tremors, seizures, syncope, light-headedness, numbness and headaches.   Hematological:  Does not bruise/bleed easily.   Psychiatric/Behavioral:  Positive for sleep disturbance (OSAS but not on active treatment). Negative for agitation, confusion and dysphoric mood. The patient is not nervous/anxious and is not hyperactive.      Objective: Ht 5' 10" (1.778 m)   Wt 106.1 kg (233 lb 14.5 oz)   BMI 33.56 kg/m²  /60 (BP Location: Left arm, Patient Position: Sitting, BP Method: Large (Manual))   Pulse (!) 52   Temp 98 °F (36.7 °C) (Oral)   Ht 5' 10" (1.778 m)   Wt 106.1 kg (233 lb 14.5 oz)   SpO2 96%   BMI 33.56 kg/m²  Body surface area is 2.29 meters squared.           Physical Exam  Vitals reviewed.   Constitutional:       General: He is not in acute distress.     Appearance: He is " well-developed. He is obese. He is not toxic-appearing or diaphoretic.      Comments: Pleasant well spoken elderly gentleman. Not pale, anicteric and afebrile. Well hydrated. Obese.  Clinically comfortable. Not in any acute distress. Not acutely nor chronically ill looking. Acyanotic.   HENT:      Head: Normocephalic and atraumatic. No right periorbital erythema or left periorbital erythema. Hair is normal.      Comments: Nil nuchal AN. Mallampati grade 2 fauces.     Mouth/Throat:      Pharynx: No oropharyngeal exudate.   Eyes:      General: Lids are normal. No scleral icterus.     Extraocular Movements: Extraocular movements intact.      Conjunctiva/sclera: Conjunctivae normal.      Pupils: Pupils are equal, round, and reactive to light.   Neck:      Thyroid: No thyroid mass or thyromegaly.      Vascular: No carotid bruit or JVD.      Trachea: Trachea normal. No tracheal tenderness or tracheal deviation.   Cardiovascular:      Rate and Rhythm: Normal rate and regular rhythm.      Pulses: Normal pulses.      Heart sounds: Normal heart sounds. No murmur heard.    No gallop.   Pulmonary:      Effort: Pulmonary effort is normal. No respiratory distress.      Breath sounds: Normal breath sounds. No stridor. No decreased breath sounds, wheezing, rhonchi or rales.   Abdominal:      General: Bowel sounds are normal. There is no distension.      Palpations: Abdomen is soft. There is no mass.      Tenderness: There is no abdominal tenderness.      Comments: Obese anterior abdominal wall.   Musculoskeletal:         General: No swelling or tenderness. Normal range of motion.      Cervical back: Normal range of motion and neck supple. No rigidity or tenderness.      Comments: Nil digital clubbing. No pedal edema and no calf swelling nor tenderness.   Lymphadenopathy:      Cervical: No cervical adenopathy.   Skin:     General: Skin is warm and dry.      Findings: No bruising, ecchymosis, erythema, petechiae or rash.      Nails:  There is no clubbing.   Neurological:      General: No focal deficit present.      Mental Status: He is alert and oriented to person, place, and time.      Cranial Nerves: Cranial nerves 2-12 are intact. No cranial nerve deficit.      Sensory: Sensation is intact.      Motor: Motor function is intact. No tremor or abnormal muscle tone.      Coordination: Coordination is intact.      Gait: Gait is intact. Gait normal.      Deep Tendon Reflexes: Reflexes are normal and symmetric. Reflexes normal.   Psychiatric:         Attention and Perception: Attention normal.         Mood and Affect: Mood normal. Mood is not anxious or depressed.         Speech: Speech normal.         Behavior: Behavior normal. Behavior is cooperative.         Thought Content: Thought content normal.         Cognition and Memory: Cognition normal.         Judgment: Judgment normal.       Lab Review:      Latest Reference Range & Units 02/07/22 00:00 02/07/22 14:29 03/02/22 07:20 03/02/22 07:22 06/08/22 09:03 09/07/22 08:40 12/07/22 07:40 02/07/23 11:19 02/07/23 11:25 04/10/23 07:03   WBC 3.8 - 10.8 Thousand/uL   8.1   9.4 10.6   8.1   RBC 4.20 - 5.80 Million/uL   4.64   4.65 5.06   4.70   Hemoglobin 13.2 - 17.1 g/dL   14.8   14.1 16.0   14.8   Hematocrit 38.5 - 50.0 %   44.0   43.9 48.4   45.2   MCV 80.0 - 100.0 fL   94.8   94.4 95.7   96.2   MCH 27.0 - 33.0 pg   31.9   30.3 31.6   31.5   MCHC 32.0 - 36.0 g/dL   33.6   32.1 33.1   32.7   RDW 11.0 - 15.0 %   12.5   12.6 12.3   12.6   Platelets 140 - 400 Thousand/uL   184   211 235   204   MPV 7.5 - 12.5 fL   9.9   10.0 10.1   10.2   Neutrophils Relative %   55   63.1 67.2   61   Lymph % %   33.9   23.4 20.1   26.9   Mono % %   7.9   10.2 9.7   8.8   Eosinophil % %   2.5   2.8 2.5   2.7   Basophil % %   0.7   0.5 0.5   0.6   Neutrophils, Abs 1,500 - 7,800 cells/uL   4,455   5,931 7,123   4,941   Lymph # 850 - 3,900 cells/uL   2,746   2,200 2,131   2,179   Mono # 200 - 950 cells/uL   640   959 (H)  1,028 (H)   713   Eos # 15 - 500 cells/uL   203   263 265   219   Baso # 0 - 200 cells/uL   57   47 53   49   Sodium 135 - 146 mmol/L   139  139 140 139  135 (L) 139   Potassium 3.5 - 5.3 mmol/L   4.6  4.3 4.4 4.9  4.7 4.7   Chloride 98 - 110 mmol/L   101  101 101 98  101 100   CO2 20 - 32 mmol/L   32  29 25 34 (H)  25 30   Anion Gap 8 - 16 mmol/L         9    BUN 7 - 25 mg/dL   21  23 16 17  25 (H) 18   Creatinine 0.70 - 1.28 mg/dL   0.87  0.86 0.79 0.82  1.1 0.88   BUN/CREAT RATIO 6 - 22 (calc)   NOT APPLICABLE  NOT APPLICABLE NOT APPLICABLE NOT APPLICABLE   NOT APPLICABLE   eGFR >60 mL/min/1.73 m^2         >60    eGFR > OR = 60 mL/min/1.73m2      91 90   88   eGFR if non African American > OR = 60 mL/min/1.73m2   83  84        eGFR if African American > OR = 60 mL/min/1.73m2   96  97        Glucose 65 - 99 mg/dL   275 (H)  135 (H) 229 (H) 245 (H)  412 (H) 206 (H)   Calcium 8.6 - 10.3 mg/dL   9.2  9.1 9.2 9.1  9.2 9.3   Alkaline Phosphatase 35 - 144 U/L   53  53 52 67  66 60   PROTEIN TOTAL 6.1 - 8.1 g/dL   6.5  6.7 6.5 6.6  6.7 6.7   Albumin 3.6 - 5.1 g/dL   4.1  4.1 4.1 4.2  3.8 4.1   Albumin/Globulin Ratio 1.0 - 2.5 (calc)   1.7  1.6 1.7 1.8   1.6   BILIRUBIN TOTAL 0.2 - 1.2 mg/dL   0.4  0.5 0.6 0.5  0.3 0.5   AST 10 - 35 U/L   11  10 14 12  15 13   ALT 9 - 46 U/L   11  9 5 (L) 40  16 12   CRP 0.0 - 8.2 mg/L         2.0    Globulin, Total 1.9 - 3.7 g/dL (calc)   2.4  2.6 2.4 2.4   2.6   Cholesterol <200 mg/dL   149  148 143    122   HDL > OR = 40 mg/dL   36 (L)  31 (L) 34 (L)    32 (L)   HDL/Cholesterol Ratio <5.0 (calc)   4.1  4.8 4.2    3.8   LDL Cholesterol External mg/dL (calc)   92  96 88    73   Non HDL Chol. (LDL+VLDL) <130 mg/dL (calc)   113  117 109    90   Triglycerides <150 mg/dL   118  113 117    91   Hemoglobin A1C External <5.7 % of total Hgb   9.6 (H)  9.6 (H) 9.0 (H) 8.4 (H)   8.6 (H)   PROSTATE SPECIFIC ANTIGEN, SCR - QUEST < OR = 4.00 ng/mL   1.79       0.97   Shiga Toxin 1 E.coli          Negative     Shiga Toxin 2 E.coli         Negative     Hepatitis C Ab NON-REACTIVE     NON-REACTIVE         Adenovirus PCR Result Negative         Negative     Adenovirus Specimen Source         stool (C)     Stool Exam-Ova,Cysts,Parasites         FINAL 02/13/2023 1246     Creatinine, Urine 20 - 320 mg/dL 85            Microalb, Ur See Note: mg/dL 0.6            MICROALB/CREAT RATIO <30 mcg/mg creat 7            Calprotectin <50 mcg/g        28.6     C. diff Antigen Negative         Negative     C difficile Toxins A+B, EIA Negative         Negative     Elastase 1, Fecal >200 (Normal) mcg/g        145 (L)     Giardia Antigen - EIA Negative         Negative     Cryptosporidium Antigen Negative         Negative     H. Pylori Antigen, Stool NOT DETECTED         NOT DETECTED     Rotavirus Negative         Negative     Stool WBC No neutrophils seen         No neutrophils seen     H. pylori Antigen Source         stool     CLOSTRIDIUM DIFFICILE         Rpt     CULTURE, STOOL         Rpt     CULTURE, URINE  Rpt            ENTEROHEMORRHAGIC E.COLI         Rpt     Color, UA   Yellow           pH, UA   6           Spec Grav UA   1.015           Blood, UA   trace           WBC, UA   neg           Nitrite, UA   neg           Glucose, UA   1,000           Bilirubin, POC   neg           Protein, POC   trace           Ketones, UA   neg           Urobilinogen, UA   normal           (H): Data is abnormally high  (L): Data is abnormally low  (C): Corrected  Rpt: View report in Results Review for more information    Assessment:     1. Type 2 diabetes mellitus with hyperglycemia, with long-term current use of insulin  Ambulatory referral/consult to Diabetes Education    GlycoMark (TM)    Fructosamine    Uric Acid    Urinalysis    Microalbumin/Creatinine Ratio, Urine    Amylase    Lipase    Calcitonin      2. Type 2 diabetes mellitus with diabetic polyneuropathy, with long-term current use of insulin  Ambulatory referral/consult to  Endocrinology      3. Obesity (BMI 30-39.9)        4. Myasthenia gravis        5. History of stroke        6. Hypertension, essential  Urinalysis    Microalbumin/Creatinine Ratio, Urine    Aldosterone    Renin      7. Hyperlipidemia, unspecified hyperlipidemia type        8. Stage 3a chronic kidney disease        9. B12 deficiency  Vitamin B12    Vitamin B12 Deficiency Panel    Folate      10. Thyroid function test abnormal  TSH      11. Hypovitaminosis D  Calcium, Ionized    PTH, Intact    Vitamin D         Regarding type 2 diabetes; the patient is very concerned about medication expenses and does not want therefore to adjust his current insulin dose.    To add low dose prandin 0.5mg TID QAC to improved prandial glycemic control. To keep NPH dose unchanged for now. To ensure SMBG testing Qac AND HS and send us the data for review monthly based on which we will make adjustments to his current NPH dosing schedule. He does not want any formal CDE or nutritional counselling at this time as feels he has heard it all and knows what he should be eating.  Regarding essential hypertension;  BP is presently well controlled. To continue serial ambulatory tracking of BP and pulse rates. No change to her present antihypertensive regimen.  Regarding hyperlipidemia;  His latest lipid profile is at the desired range. To continue antilipidemics as before.  Regarding Obesity; No active intervention in this regard.  Regarding hypovitaminosis D; to check current 25 Oh vit D levels and replete as needed based on the results.  Regarding vitamin B12 deficiency; to check current vitamin B12 levels and will replete as needed based on results.  Regarding ASCVD and past history of CVD; to continue low dose asa.              Plan:       FFup in ~ 1 year.

## 2023-04-26 LAB
25(OH)D3+25(OH)D2 SERPL-MCNC: 33 NG/ML (ref 30–96)
FOLATE SERPL-MCNC: 12 NG/ML (ref 4–24)
PTH-INTACT SERPL-MCNC: 80.4 PG/ML (ref 9–77)
VIT B12 SERPL-MCNC: 497 PG/ML (ref 210–950)

## 2023-04-27 LAB
CALCIT SERPL-MCNC: <5 PG/ML
VIT B12 SERPL-MCNC: 378 NG/L (ref 180–914)

## 2023-04-28 LAB
ALDOST SERPL-MCNC: 6.9 NG/DL
FRUCTOSAMINE SERPL-SCNC: 367 UMOL /L

## 2023-05-01 LAB
GLYCOMARK (TM): 5.3 UG/ML
METHYLMALONATE SERPL-SCNC: 0.2 NMOL/ML
RENIN PLAS-CCNC: 7.7 NG/ML/H

## 2023-05-18 NOTE — TELEPHONE ENCOUNTER
No care due was identified.  [facility] Embedded Care Due Messages. Reference number: [id number].   5/18/2023 4:18:17 PM CDT

## 2023-05-18 NOTE — TELEPHONE ENCOUNTER
----- Message from Milli Mann sent at 5/18/2023  9:54 AM CDT -----  Regarding: pt request  Name of Who is Calling:AINSLEY GUERRA [[id number]]          What is the request in detail: pt needs some more test strips  for glucose machine           Can the clinic reply by [facility]: no           [facility]  [phone] (home)

## 2023-05-22 RX ORDER — AMLODIPINE BESYLATE 5 MG/1
10 TABLET ORAL DAILY
Qty: 180 TABLET | Refills: 1 | Status: SHIPPED | OUTPATIENT
Start: 2023-05-22 | End: 2023-11-27 | Stop reason: SDUPTHER

## 2023-05-22 NOTE — TELEPHONE ENCOUNTER
No care due was identified.  [facility] Embedded Care Due Messages. Reference number: [id number].   5/22/2023 3:41:31 PM CDT

## 2023-05-22 NOTE — TELEPHONE ENCOUNTER
Refill Decision Note   Fazal Wade  is requesting a refill authorization.  Brief Assessment and Rationale for Refill:  Approve     Medication Therapy Plan:       Medication Reconciliation Completed: No    Comments:     No Care Gaps recommended.     Note composed:6:18 PM 05/22/2023

## 2023-05-25 ENCOUNTER — TELEPHONE (OUTPATIENT)
Dept: FAMILY MEDICINE | Facility: CLINIC | Age: 78
End: 2023-05-25
Payer: MEDICARE

## 2023-05-25 NOTE — TELEPHONE ENCOUNTER
----- Message from Kit Aguero sent at 5/25/2023  3:29 PM CDT -----  Contact: Self  Type: Needs Medical Advice  Who Called:  Gentry  Best Call Back Number: [phone]   Additional Information: [facility] told patient they didn't received test strips sent 5/18. Please call.

## 2023-05-29 DIAGNOSIS — Z79.4 TYPE 2 DIABETES MELLITUS WITH DIABETIC POLYNEUROPATHY, WITH LONG-TERM CURRENT USE OF INSULIN: ICD-10-CM

## 2023-05-29 DIAGNOSIS — E11.42 TYPE 2 DIABETES MELLITUS WITH DIABETIC POLYNEUROPATHY, WITH LONG-TERM CURRENT USE OF INSULIN: ICD-10-CM

## 2023-05-29 DIAGNOSIS — Z79.4 TYPE 2 DIABETES MELLITUS WITH DIABETIC POLYNEUROPATHY, WITH LONG-TERM CURRENT USE OF INSULIN: Primary | ICD-10-CM

## 2023-05-29 DIAGNOSIS — I10 HYPERTENSION, ESSENTIAL: ICD-10-CM

## 2023-05-29 DIAGNOSIS — E11.42 TYPE 2 DIABETES MELLITUS WITH DIABETIC POLYNEUROPATHY, WITH LONG-TERM CURRENT USE OF INSULIN: Primary | ICD-10-CM

## 2023-05-29 RX ORDER — CARVEDILOL 12.5 MG/1
TABLET ORAL
Qty: 180 TABLET | Refills: 1 | Status: SHIPPED | OUTPATIENT
Start: 2023-05-29 | End: 2023-12-07

## 2023-05-29 NOTE — TELEPHONE ENCOUNTER
No care due was identified.  [facility] Embedded Care Due Messages. Reference number: [id number].   5/29/2023 10:40:11 AM CDT

## 2023-05-30 NOTE — TELEPHONE ENCOUNTER
Refill Decision Note   Fazal Wade  is requesting a refill authorization.  Brief Assessment and Rationale for Refill:  Approve     Medication Therapy Plan:  PCP previously prescribed rx    Medication Reconciliation Completed: No   Comments:     No Care Gaps recommended.     Note composed:9:43 PM 05/29/2023

## 2023-06-05 RX ORDER — HYDROCHLOROTHIAZIDE 12.5 MG/1
TABLET ORAL
Qty: 90 TABLET | Refills: 1 | Status: SHIPPED | OUTPATIENT
Start: 2023-06-05 | End: 2023-11-27 | Stop reason: SDUPTHER

## 2023-06-05 NOTE — TELEPHONE ENCOUNTER
Refill Decision Note   Fazal Wade  is requesting a refill authorization.  Brief Assessment and Rationale for Refill:  Approve     Medication Therapy Plan:         Comments:     Note composed:11:37 AM 06/05/2023             Appointments     Last Visit   12/13/2022 Sylvester Lugo III, MD   Next Visit   Visit date not found Sylvester Lugo III, MD

## 2023-06-05 NOTE — TELEPHONE ENCOUNTER
No care due was identified.  [facility] Embedded Care Due Messages. Reference number: [id number].   6/05/2023 11:09:21 AM CDT

## 2023-06-07 ENCOUNTER — PES CALL (OUTPATIENT)
Dept: ADMINISTRATIVE | Facility: CLINIC | Age: 78
End: 2023-06-07
Payer: MEDICARE

## 2023-06-30 RX ORDER — LISINOPRIL 40 MG/1
TABLET ORAL
Qty: 90 TABLET | Refills: 1 | Status: SHIPPED | OUTPATIENT
Start: 2023-06-30 | End: 2023-11-24

## 2023-06-30 NOTE — TELEPHONE ENCOUNTER
Refill Decision Note   Fazal Wade  is requesting a refill authorization.  Brief Assessment and Rationale for Refill:  Approve     Medication Therapy Plan:         Comments:     Note composed:12:44 PM 06/30/2023             Appointments     Last Visit   12/13/2022 Sylvester Lugo III, MD   Next Visit   Visit date not found Sylvester Lugo III, MD

## 2023-06-30 NOTE — TELEPHONE ENCOUNTER
No care due was identified.  [facility] Embedded Care Due Messages. Reference number: [id number].   6/30/2023 9:56:44 AM CDT

## 2023-07-03 ENCOUNTER — PATIENT MESSAGE (OUTPATIENT)
Dept: ENDOCRINOLOGY | Facility: CLINIC | Age: 78
End: 2023-07-03
Payer: MEDICARE

## 2023-07-26 ENCOUNTER — TELEPHONE (OUTPATIENT)
Dept: ENDOCRINOLOGY | Facility: CLINIC | Age: 78
End: 2023-07-26
Payer: MEDICARE

## 2023-07-26 NOTE — TELEPHONE ENCOUNTER
Called and spoke with patient regarding test strips.  Patient is running out early for the month because he is testing his blood sugar sometimes 6-8 x day d/t low sugar readings.  Pt is requesting to have more test strips per month.  Please advise.    ----- Message from Delmi Mojica, Patient Care Assistant sent at 7/26/2023 10:19 AM CDT -----  Regarding: advice  Contact: pt  Type: Needs Medical Advice    Who Called:  pt     Best Call Back Number: [phone] (home)     Additional Information: pt states he would like a callback regarding needing extra test strips. Please call pt to advise. Thanks!

## 2023-08-04 ENCOUNTER — TELEPHONE (OUTPATIENT)
Dept: FAMILY MEDICINE | Facility: CLINIC | Age: 78
End: 2023-08-04
Payer: MEDICARE

## 2023-08-04 ENCOUNTER — TELEPHONE (OUTPATIENT)
Dept: ENDOCRINOLOGY | Facility: CLINIC | Age: 78
End: 2023-08-04
Payer: MEDICARE

## 2023-08-04 NOTE — TELEPHONE ENCOUNTER
"Sent message to pt via portal.      Called and spoke with Cherry @ [facility] [employer] regarding patient informed her that he stopped taking the Prandin.  He did not give her a reason why, he just said he took himself off of medication.  Called and spoke with pt about medication.  Pt reported having "severe" stomach cramps for 3 days after he started medication.  Pt reported taking his insulin @ 3:00pm and at 5:00 pm his blood sugar readings will be 200-300's.  Glucose readings most mornings are , as reported by patient.      ----- Message from Maricel Ritter sent at 8/4/2023  1:56 PM CDT -----  Contact: [employer]  Type:  Insurance    Name of Caller: [employer]     Prescription Name:repaglinide (PRANDIN) 0.5 MG tablet    What do they need to clarify?: Pt has taken himself off of the above listed medication...   Best Call Back Number: [phone]  Please call to advise... Thank you...      "

## 2023-08-17 ENCOUNTER — TELEPHONE (OUTPATIENT)
Dept: FAMILY MEDICINE | Facility: CLINIC | Age: 78
End: 2023-08-17
Payer: MEDICARE

## 2023-09-26 ENCOUNTER — TELEPHONE (OUTPATIENT)
Dept: FAMILY MEDICINE | Facility: CLINIC | Age: 78
End: 2023-09-26
Payer: MEDICARE

## 2023-09-26 NOTE — TELEPHONE ENCOUNTER
----- Message from Pao Morales sent at 9/26/2023 10:10 AM CDT -----  Contact: pt  Type: Needs Medical Advice         Who Called: pt   Best Call Back Number:[phone]  Additional Information: Requesting a call back regarding pt is needing a new rx for a new glucose meter. Pt is having issues with the current one he has. Pt is needing it to be sent to [facility].    [facility].  Address: [address] [place] [address] [place] [address]  Phone: [phone]. [phone]    Please Advise- Thank you

## 2023-10-06 ENCOUNTER — TELEPHONE (OUTPATIENT)
Dept: FAMILY MEDICINE | Facility: CLINIC | Age: 78
End: 2023-10-06
Payer: MEDICARE

## 2023-10-06 NOTE — TELEPHONE ENCOUNTER
----- Message from Alejandrina Walker sent at 10/6/2023 10:29 AM CDT -----  Contact: PT  Who Called:  PT  Best Call Back Number:[phone]  Additional Information: Requesting a call back regarding pt is needing a new rx for a new glucose meter. Pt is having issues with the current one he has. Pt is needing it to be sent to [facility].    [facility].  Address: [address] [place] [address] [place] [address].   Phone: [phone]. [phone]   Additional Information: Asking to please take care of today he is having to purchase test strips for an old meter he has at home. That he's currently using

## 2023-10-08 ENCOUNTER — PATIENT MESSAGE (OUTPATIENT)
Dept: ADMINISTRATIVE | Facility: HOSPITAL | Age: 78
End: 2023-10-08
Payer: MEDICARE

## 2023-10-09 DIAGNOSIS — Z79.4 TYPE 2 DIABETES MELLITUS WITH DIABETIC POLYNEUROPATHY, WITH LONG-TERM CURRENT USE OF INSULIN: Primary | ICD-10-CM

## 2023-10-09 DIAGNOSIS — E11.42 TYPE 2 DIABETES MELLITUS WITH DIABETIC POLYNEUROPATHY, WITH LONG-TERM CURRENT USE OF INSULIN: Primary | ICD-10-CM

## 2023-11-24 DIAGNOSIS — E78.5 HYPERLIPIDEMIA, UNSPECIFIED HYPERLIPIDEMIA TYPE: ICD-10-CM

## 2023-11-24 RX ORDER — SIMVASTATIN 20 MG/1
20 TABLET, FILM COATED ORAL NIGHTLY
Qty: 90 TABLET | Refills: 0 | Status: SHIPPED | OUTPATIENT
Start: 2023-11-24 | End: 2024-02-02 | Stop reason: SDUPTHER

## 2023-11-24 RX ORDER — LISINOPRIL 40 MG/1
TABLET ORAL
Qty: 90 TABLET | Refills: 0 | Status: SHIPPED | OUTPATIENT
Start: 2023-11-24 | End: 2024-03-26

## 2023-11-24 NOTE — TELEPHONE ENCOUNTER
Refill Decision Note   Fazal Wade  is requesting a refill authorization.  Brief Assessment and Rationale for Refill:  Approve     Medication Therapy Plan:         Comments:     Note composed:10:41 AM 11/24/2023

## 2023-11-24 NOTE — TELEPHONE ENCOUNTER
No care due was identified.  [facility] Embedded Care Due Messages. Reference number: [id number].   11/24/2023 9:59:25 AM CST

## 2023-11-27 RX ORDER — AMLODIPINE BESYLATE 5 MG/1
10 TABLET ORAL DAILY
Qty: 180 TABLET | Refills: 0 | Status: SHIPPED | OUTPATIENT
Start: 2023-11-27 | End: 2024-02-02

## 2023-11-27 RX ORDER — HYDROCHLOROTHIAZIDE 12.5 MG/1
TABLET ORAL
Qty: 90 TABLET | Refills: 0 | Status: SHIPPED | OUTPATIENT
Start: 2023-11-27 | End: 2024-02-28

## 2023-11-27 NOTE — TELEPHONE ENCOUNTER
----- Message from Ivette Dior sent at 11/27/2023 11:45 AM CST -----  Contact: pt [phone]  Type:  RX Refill Request    Who Called:  Pt   Refill or New Rx:  refill     Is this a 30 day or 90 day RX: 90  Preferred Pharmacy with phone number:    [facility] [address] [facility] [address] [address] [place] [address]  Phone: [phone] Fax: [phone]      Local or Mail Order:  Local   Ordering Provider:  Parish Robles Call Back Number:  [phone]    Additional Information:  Pt stated pharmacy does not have refill for simvastatin (ZOCOR) 20 MG tablet on file.    Pt also asking for refill on amLODIPine (NORVASC) 5 MG tablet. Pls call back and advise

## 2023-11-27 NOTE — TELEPHONE ENCOUNTER
----- Message from Rubén Mcallister sent at 11/27/2023 11:51 AM CST -----  Type: Needs Medical Advice  Who Called:  Patient    Pharmacy name and phone #:    [facility] - [place] - [address]  [address]  [place] [address]  Phone: [phone] Fax: [phone]        Best Call Back Number:  [phone]     Additional Information: Patient states that he has requested refills but was informed that he wasn't due for a refill:    amLODIPine (NORVASC) 5 MG tablet    hydroCHLOROthiazide (HYDRODIURIL) 12.5 MG Tab    Please call to advise

## 2023-12-07 DIAGNOSIS — I10 HYPERTENSION, ESSENTIAL: ICD-10-CM

## 2023-12-07 RX ORDER — CARVEDILOL 12.5 MG/1
TABLET ORAL
Qty: 180 TABLET | Refills: 0 | Status: SHIPPED | OUTPATIENT
Start: 2023-12-07 | End: 2024-02-02 | Stop reason: DRUGHIGH

## 2023-12-07 NOTE — TELEPHONE ENCOUNTER
No care due was identified.  [facility] Embedded Care Due Messages. Reference number: [id number].   12/07/2023 3:15:48 PM CST

## 2023-12-08 NOTE — TELEPHONE ENCOUNTER
Refill Decision Note   Fazal Wade  is requesting a refill authorization.  Brief Assessment and Rationale for Refill:  Approve     Medication Therapy Plan:         Comments:     Note composed:10:44 PM 12/07/2023

## 2024-01-11 DIAGNOSIS — Z00.00 ENCOUNTER FOR MEDICARE ANNUAL WELLNESS EXAM: ICD-10-CM

## 2024-01-24 DIAGNOSIS — E55.9 HYPOVITAMINOSIS D: ICD-10-CM

## 2024-01-24 DIAGNOSIS — E53.8 VITAMIN B12 DEFICIENCY: ICD-10-CM

## 2024-01-24 DIAGNOSIS — E11.42 TYPE 2 DIABETES MELLITUS WITH DIABETIC POLYNEUROPATHY, WITH LONG-TERM CURRENT USE OF INSULIN: Primary | ICD-10-CM

## 2024-01-24 DIAGNOSIS — Z79.4 TYPE 2 DIABETES MELLITUS WITH DIABETIC POLYNEUROPATHY, WITH LONG-TERM CURRENT USE OF INSULIN: Primary | ICD-10-CM

## 2024-02-02 ENCOUNTER — OFFICE VISIT (OUTPATIENT)
Dept: FAMILY MEDICINE | Facility: CLINIC | Age: 79
End: 2024-02-02
Payer: MEDICARE

## 2024-02-02 VITALS
TEMPERATURE: 98 F | HEART RATE: 60 BPM | OXYGEN SATURATION: 96 % | SYSTOLIC BLOOD PRESSURE: 138 MMHG | BODY MASS INDEX: 33.22 KG/M2 | DIASTOLIC BLOOD PRESSURE: 80 MMHG | HEIGHT: 70 IN | WEIGHT: 232.06 LBS

## 2024-02-02 DIAGNOSIS — N18.31 STAGE 3A CHRONIC KIDNEY DISEASE: ICD-10-CM

## 2024-02-02 DIAGNOSIS — E78.5 HYPERLIPIDEMIA, UNSPECIFIED HYPERLIPIDEMIA TYPE: ICD-10-CM

## 2024-02-02 DIAGNOSIS — G70.00 MYASTHENIA GRAVIS: ICD-10-CM

## 2024-02-02 DIAGNOSIS — I10 HYPERTENSION, ESSENTIAL: Primary | ICD-10-CM

## 2024-02-02 PROCEDURE — 99999 PR PBB SHADOW E&M-EST. PATIENT-LVL IV: CPT | Mod: PBBFAC,,,

## 2024-02-02 PROCEDURE — 99214 OFFICE O/P EST MOD 30 MIN: CPT | Mod: S$GLB,,,

## 2024-02-02 RX ORDER — SIMVASTATIN 20 MG/1
20 TABLET, FILM COATED ORAL NIGHTLY
Qty: 90 TABLET | Refills: 1 | Status: SHIPPED | OUTPATIENT
Start: 2024-02-02 | End: 2024-02-07

## 2024-02-02 RX ORDER — AMLODIPINE BESYLATE 10 MG/1
10 TABLET ORAL DAILY
Qty: 90 TABLET | Refills: 1 | Status: SHIPPED | OUTPATIENT
Start: 2024-02-02 | End: 2025-02-01

## 2024-02-02 RX ORDER — CARVEDILOL 25 MG/1
25 TABLET ORAL 2 TIMES DAILY WITH MEALS
Qty: 180 TABLET | Refills: 1 | Status: SHIPPED | OUTPATIENT
Start: 2024-02-02 | End: 2024-03-20 | Stop reason: SDUPTHER

## 2024-02-02 NOTE — PROGRESS NOTES
Subjective:       Patient ID: Fazal Wade is a 78 y.o. male.    Chief Complaint: Hypertension (Yest am 230/150 pt said his mach.)    Fazal Wade is a 78-year-old male patient who presents to clinic today concerns of an high blood pressure at home.  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  He checked his blood pressure and his reading was 227/130.  He took his blood pressure again yesterday and it was 230/150.  Today in clinic his blood pressure is 160/80 on arrival and 138/80 with recheck.  He has no chest pain or shortness a breath.  He states he has not sure he has been taking his medication twice a day like he is supposed to.  Currently his medications are carvedilol 12.5 mg twice daily, amlodipine 5 mg twice daily open (he has been taking 2 tablets in the morning), and lisinopril 40 mg daily.  When he noticed his blood pressures were high he doubled his evening dose of carvedilol.  His blood pressure came down to 160/60s to 70s.  Hyperlipidemia: last lipids: TC:  122, Tri HDL:  32, LDL:  73.  He was previously prescribed simvastatin 20 mg and did take for a little while but stopped taking after he saw a Facebook post about side effects.  He is currently under treatment for myasthenia gravis with Dr. Laura Cervantes. He is currently taking Cellcept 1250 mg twice daily.  Type 2 diabetes, he takes over-the-counter insulin 60 units in the morning and 40 units in the evening.  His last H A1c was 8.6 in .  He is due for his eye exam.  CKD stage 3A with his last GFR of greater than 60.              Review of patient's allergies indicates:   Allergen Reactions    Levaquin [levofloxacin]      Social Determinants of Health     Tobacco Use: Low Risk  (2024)    Patient History     Smoking Tobacco Use: Never     Smokeless Tobacco Use: Never     Passive Exposure: Not on file   Alcohol Use: Not At Risk (2019)    AUDIT-C     Frequency of  Alcohol Consumption: Never     Average Number of Drinks: Not on file     Frequency of Binge Drinking: Not on file   Financial Resource Strain: Not on file   Food Insecurity: Not on file   Transportation Needs: Not on file   Physical Activity: Not on file   Stress: No Stress Concern Present (8/4/2020)    [employer] - Occupational Stress Questionnaire     Feeling of Stress : Not at all   Social Connections: Not on file   Housing Stability: Not on file   Depression: Low Risk  (2/2/2024)    Depression     Last PHQ-4: Flowsheet Data: 0      Past Medical History:   Diagnosis Date    Diabetes mellitus, type 2     Hyperlipidemia     Hypertension       Past Surgical History:   Procedure Laterality Date    COLONOSCOPY      EYE SURGERY        Social History     Socioeconomic History    Marital status: Single         Current Outpatient Medications:     ascorbic acid, vitamin C, (VITAMIN C) 100 MG tablet, Take 100 mg by mouth once daily., Disp: , Rfl:     blood sugar diagnostic Strp, 1 each by Misc.(Non-Drug; Combo Route) route 3 (three) times daily., Disp: 200 each, Rfl: 5    hydroCHLOROthiazide (HYDRODIURIL) 12.5 MG Tab, Take 1 tablet (12.5 mg total) by mouth once daily., Disp: 90 tablet, Rfl: 0    insulin  unit/mL injection, Inject 50 Units into the skin 2 (two) times daily before meals. Pt states 60 u sq am  and 40 u sq in pm. And its otc insulin., Disp: , Rfl:     lisinopriL (PRINIVIL,ZESTRIL) 40 MG tablet, Take 1 tablet by mouth once daily, Disp: 90 tablet, Rfl: 0    magnesium 30 mg Tab, Take by mouth once., Disp: , Rfl:     mycophenolate (CELLCEPT) 500 mg Tab, Take 1,250 mg by mouth 2 (two) times daily. 2.5 tablets twice daily, Disp: , Rfl:     vit A/vit C/vit E/zinc/copper (PRESERVISION AREDS ORAL), Take by mouth., Disp: , Rfl:     amLODIPine (NORVASC) 10 MG tablet, Take 1 tablet (10 mg total) by mouth once daily., Disp: 90 tablet, Rfl: 1    aspirin (ECOTRIN) 81 MG EC tablet, Take 1  tablet (81 mg total) by mouth once daily., Disp: 90 tablet, Rfl: 1    carvediloL (COREG) 25 MG tablet, Take 1 tablet (25 mg total) by mouth 2 (two) times daily with meals., Disp: 180 tablet, Rfl: 1    fluticasone propionate (FLONASE) 50 mcg/actuation nasal spray, 2 sprays (100 mcg total) by Each Nostril route once daily. (Patient not taking: Reported on 2/2/2024), Disp: 16 g, Rfl: 5    lipase-protease-amylase 24,000-76,000-120,000 units (CREON) 24,000-76,000 -120,000 unit capsule, Take 1 capsule by mouth As instructed (4 times daily with meals & snack). (Patient not taking: Reported on 4/25/2023), Disp: 120 capsule, Rfl: 2    Current Facility-Administered Medications:     methylPREDNISolone sodium succinate injection 40 mg, 40 mg, Intravenous, Once PRN, Jacques Cali MD    Lab Results   Component Value Date    WBC 9.64 02/05/2024    HGB 15.2 02/05/2024    HCT 45.9 02/05/2024     02/05/2024    CHOL 122 04/10/2023    TRIG 91 04/10/2023    HDL 32 (L) 04/10/2023    ALT 11 02/05/2024    AST 11 02/05/2024     02/05/2024    K 4.5 02/05/2024    CL 99 02/05/2024    CREATININE 1.0 02/05/2024    BUN 25 (H) 02/05/2024    CO2 31 (H) 02/05/2024    TSH 0.816 04/25/2023    INR 1.0 08/19/2021    HGBA1C 8.6 (H) 04/10/2023    MICROALBUR 0.6 02/07/2022       Review of Systems   Constitutional:  Negative for chills and fever.   Respiratory:  Negative for chest tightness and shortness of breath.    Cardiovascular:  Negative for chest pain, palpitations and leg swelling.   Neurological:  Negative for facial asymmetry, speech difficulty, weakness, numbness and headaches.   Psychiatric/Behavioral:  Positive for decreased concentration. The patient is not nervous/anxious.        Objective:      Physical Exam  Constitutional:       Appearance: Normal appearance.   Neck:      Vascular: No carotid bruit.   Cardiovascular:      Rate and Rhythm: Normal rate and regular rhythm.      Pulses: Normal pulses.      Heart sounds:  Normal heart sounds.   Pulmonary:      Effort: Pulmonary effort is normal.      Breath sounds: Normal breath sounds.   Musculoskeletal:      Right lower leg: No edema.      Left lower leg: No edema.   Lymphadenopathy:      Cervical: No cervical adenopathy.   Skin:     General: Skin is warm and dry.      Capillary Refill: Capillary refill takes 2 to 3 seconds.   Neurological:      Mental Status: He is alert.      Cranial Nerves: No cranial nerve deficit.      Motor: Motor function is intact.      Coordination: Coordination is intact.      Gait: Gait is intact.   Psychiatric:         Mood and Affect: Mood normal.         Behavior: Behavior normal.         Thought Content: Thought content normal.         Judgment: Judgment normal.         Assessment:       1. Hypertension, essential    2. Stage 3a chronic kidney disease    3. Hyperlipidemia, unspecified hyperlipidemia type    4. Myasthenia gravis        Plan:       Fazal was seen today for hypertension.    Diagnoses and all orders for this visit:    Hypertension, essential  -     CBC Auto Differential; Future  -     Cancel: Comprehensive Metabolic Panel; Future  -     carvediloL (COREG) 25 MG tablet; Take 1 tablet (25 mg total) by mouth 2 (two) times daily with meals.  -     amLODIPine (NORVASC) 10 MG tablet; Take 1 tablet (10 mg total) by mouth once daily.    Stage 3a chronic kidney disease  -     CBC Auto Differential; Future  -     Cancel: Comprehensive Metabolic Panel; Future    Hyperlipidemia, unspecified hyperlipidemia type  -     Discontinue: simvastatin (ZOCOR) 20 MG tablet; Take 1 tablet (20 mg total) by mouth every evening.    Myasthenia gravis    Hypertension  -  increase coreg to 25 mg BID  - continue amlodipine and lisninopril  - Please monitor your blood pressure twice a day.  Make sure you have not had any caffeine or tobacco with in 45 minutes of checking your blood pressure.  Keep a log to bring to your next office visit.      Have labs done.  Follow  up in 2 weeks for recheck.

## 2024-02-05 ENCOUNTER — LAB VISIT (OUTPATIENT)
Dept: LAB | Facility: HOSPITAL | Age: 79
End: 2024-02-05
Payer: MEDICARE

## 2024-02-05 DIAGNOSIS — N18.31 STAGE 3A CHRONIC KIDNEY DISEASE: ICD-10-CM

## 2024-02-05 DIAGNOSIS — I10 HYPERTENSION, ESSENTIAL: ICD-10-CM

## 2024-02-05 LAB
ALBUMIN SERPL BCP-MCNC: 3.8 G/DL (ref 3.5–5.2)
ALP SERPL-CCNC: 69 U/L (ref 55–135)
ALT SERPL W/O P-5'-P-CCNC: 11 U/L (ref 10–44)
ANION GAP SERPL CALC-SCNC: 6 MMOL/L (ref 8–16)
AST SERPL-CCNC: 11 U/L (ref 10–40)
BASOPHILS # BLD AUTO: 0.05 K/UL (ref 0–0.2)
BASOPHILS NFR BLD: 0.5 % (ref 0–1.9)
BILIRUB SERPL-MCNC: 0.3 MG/DL (ref 0.1–1)
BUN SERPL-MCNC: 25 MG/DL (ref 8–23)
CALCIUM SERPL-MCNC: 8.7 MG/DL (ref 8.7–10.5)
CHLORIDE SERPL-SCNC: 99 MMOL/L (ref 95–110)
CO2 SERPL-SCNC: 31 MMOL/L (ref 23–29)
CREAT SERPL-MCNC: 1 MG/DL (ref 0.5–1.4)
DIFFERENTIAL METHOD BLD: NORMAL
EOSINOPHIL # BLD AUTO: 0.2 K/UL (ref 0–0.5)
EOSINOPHIL NFR BLD: 2.4 % (ref 0–8)
ERYTHROCYTE [DISTWIDTH] IN BLOOD BY AUTOMATED COUNT: 12.4 % (ref 11.5–14.5)
EST. GFR  (NO RACE VARIABLE): >60 ML/MIN/1.73 M^2
GLUCOSE SERPL-MCNC: 283 MG/DL (ref 70–110)
HCT VFR BLD AUTO: 45.9 % (ref 40–54)
HGB BLD-MCNC: 15.2 G/DL (ref 14–18)
IMM GRANULOCYTES # BLD AUTO: 0.03 K/UL (ref 0–0.04)
IMM GRANULOCYTES NFR BLD AUTO: 0.3 % (ref 0–0.5)
LYMPHOCYTES # BLD AUTO: 2.1 K/UL (ref 1–4.8)
LYMPHOCYTES NFR BLD: 21.4 % (ref 18–48)
MCH RBC QN AUTO: 30.5 PG (ref 27–31)
MCHC RBC AUTO-ENTMCNC: 33.1 G/DL (ref 32–36)
MCV RBC AUTO: 92 FL (ref 82–98)
MONOCYTES # BLD AUTO: 0.9 K/UL (ref 0.3–1)
MONOCYTES NFR BLD: 9.4 % (ref 4–15)
NEUTROPHILS # BLD AUTO: 6.4 K/UL (ref 1.8–7.7)
NEUTROPHILS NFR BLD: 66 % (ref 38–73)
NRBC BLD-RTO: 0 /100 WBC
PLATELET # BLD AUTO: 206 K/UL (ref 150–450)
PMV BLD AUTO: 9.6 FL (ref 9.2–12.9)
POTASSIUM SERPL-SCNC: 4.5 MMOL/L (ref 3.5–5.1)
PROT SERPL-MCNC: 6.7 G/DL (ref 6–8.4)
RBC # BLD AUTO: 4.99 M/UL (ref 4.6–6.2)
SODIUM SERPL-SCNC: 136 MMOL/L (ref 136–145)
WBC # BLD AUTO: 9.64 K/UL (ref 3.9–12.7)

## 2024-02-05 PROCEDURE — 80053 COMPREHEN METABOLIC PANEL: CPT

## 2024-02-05 PROCEDURE — 85025 COMPLETE CBC W/AUTO DIFF WBC: CPT

## 2024-02-05 PROCEDURE — 36415 COLL VENOUS BLD VENIPUNCTURE: CPT

## 2024-02-06 NOTE — PROGRESS NOTES
Blood sugar is high.  Remaining labs look good.  Follow treatment plan discussed in clinic and follow-up as recommended.

## 2024-02-12 ENCOUNTER — PATIENT MESSAGE (OUTPATIENT)
Dept: FAMILY MEDICINE | Facility: CLINIC | Age: 79
End: 2024-02-12
Payer: MEDICARE

## 2024-02-20 ENCOUNTER — TELEPHONE (OUTPATIENT)
Dept: ENDOCRINOLOGY | Facility: CLINIC | Age: 79
End: 2024-02-20
Payer: MEDICARE

## 2024-02-20 NOTE — TELEPHONE ENCOUNTER
----- Message from Dillon Ty sent at 2/20/2024  2:37 PM CST -----  Type: Needs Medical Advice  Who Called:  Leydi devi/[employer]  Best Call Back Number: [phone] and fax [phone]  Additional Information: calling to see if the office received a fax that was sent over on 2/12 for the pts chart notes and if the pt has been seen in the last 6 mos, pl call bk to advise thanks

## 2024-02-20 NOTE — TELEPHONE ENCOUNTER
Spoke with  Ms. Sheets with [employer], she stated patient was interested in getting the Rajeev 2 or 3, and he would need recent visit and order to send for the supplies. Patient has upcoming visit in April.

## 2024-02-28 ENCOUNTER — TELEPHONE (OUTPATIENT)
Dept: FAMILY MEDICINE | Facility: CLINIC | Age: 79
End: 2024-02-28
Payer: MEDICARE

## 2024-02-28 RX ORDER — HYDROCHLOROTHIAZIDE 12.5 MG/1
TABLET ORAL
Qty: 90 TABLET | Refills: 0 | Status: SHIPPED | OUTPATIENT
Start: 2024-02-28 | End: 2024-03-20

## 2024-02-28 NOTE — TELEPHONE ENCOUNTER
Care Due:                  Date            Visit Type   Department     Provider  --------------------------------------------------------------------------------                                [facility]  Last Visit: 12-      CARE ([facility])   [facility]  Next Visit: None Scheduled  None         None Found                                                            Last  Test          Frequency    Reason                     Performed    Due Date  --------------------------------------------------------------------------------    Office Visit  15 months..  hydroCHLOROthiazide,       12- 03-                             lisinopriL...............    [employer] Embedded Care Due Messages. Reference number: [id number].   2/28/2024 12:01:32 PM CST

## 2024-02-28 NOTE — TELEPHONE ENCOUNTER
Provider Staff:  Action required for this patient    Requires appointment      Please see care gap opportunities below in Care Due Message.    Thanks!  [facility]     Appointments      Date Provider   Last Visit   12/13/2022 Sylvester Lugo III, MD   Next Visit   Visit date not found Sylvester Lugo III, MD     Refill Decision Note   Fazal Wade  is requesting a refill authorization.    Brief Assessment and Rationale for Refill:  Approve       Medication Therapy Plan:         Comments:     Note composed:3:05 PM 02/28/2024

## 2024-02-29 ENCOUNTER — OFFICE VISIT (OUTPATIENT)
Dept: FAMILY MEDICINE | Facility: CLINIC | Age: 79
End: 2024-02-29
Payer: MEDICARE

## 2024-02-29 VITALS
HEART RATE: 60 BPM | OXYGEN SATURATION: 98 % | WEIGHT: 231.88 LBS | DIASTOLIC BLOOD PRESSURE: 86 MMHG | BODY MASS INDEX: 33.2 KG/M2 | RESPIRATION RATE: 18 BRPM | TEMPERATURE: 99 F | HEIGHT: 70 IN | SYSTOLIC BLOOD PRESSURE: 150 MMHG

## 2024-02-29 DIAGNOSIS — I10 HYPERTENSION, ESSENTIAL: Primary | ICD-10-CM

## 2024-02-29 DIAGNOSIS — R09.81 NASAL CONGESTION: ICD-10-CM

## 2024-02-29 PROCEDURE — 99999 PR PBB SHADOW E&M-EST. PATIENT-LVL IV: CPT | Mod: PBBFAC,,,

## 2024-02-29 PROCEDURE — 99214 OFFICE O/P EST MOD 30 MIN: CPT | Mod: S$GLB,,,

## 2024-02-29 NOTE — PROGRESS NOTES
Subjective:       Patient ID: Fazal Wade is a 78 y.o. male.    Chief Complaint: Follow-up (2 week)    Fazal Wade is a 78-year-old male patient who presents to clinic today for a 2 week blood pressure check.  Last visit his blood pressure was high and his Coreg was increased to 25 mg b.i.d..  He was continued on his current dose of amlodipine and lisinopril.  Blood pressures at home have been 130s-190s/ 60s-70s.  Blood pressure in clinic today 150/86.  He denies chest pain or shortness a breath.  He does complain of a sinus drip and nasal congestion.  He feels a dripping in the back of his throat that is causing his stomach to hurt.  He has no fever or chills and does feel like this is starting to get better.              Review of patient's allergies indicates:   Allergen Reactions    Levaquin [levofloxacin]      Social Determinants of Health     Tobacco Use: Low Risk  (2/29/2024)    Patient History     Smoking Tobacco Use: Never     Smokeless Tobacco Use: Never     Passive Exposure: Not on file   Alcohol Use: Not At Risk (6/17/2019)    AUDIT-C     Frequency of Alcohol Consumption: Never     Average Number of Drinks: Not on file     Frequency of Binge Drinking: Not on file   Financial Resource Strain: Not on file   Food Insecurity: Not on file   Transportation Needs: Not on file   Physical Activity: Not on file   Stress: No Stress Concern Present (8/4/2020)    [employer] - Occupational Stress Questionnaire     Feeling of Stress : Not at all   Social Connections: Not on file   Housing Stability: Not on file   Depression: Low Risk  (2/2/2024)    Depression     Last PHQ-4: Flowsheet Data: 0      Past Medical History:   Diagnosis Date    Diabetes mellitus, type 2     Hyperlipidemia     Hypertension       Past Surgical History:   Procedure Laterality Date    COLONOSCOPY      EYE SURGERY        Social History     Socioeconomic History    Marital status: Single         Current  Outpatient Medications:     amLODIPine (NORVASC) 10 MG tablet, Take 1 tablet (10 mg total) by mouth once daily., Disp: 90 tablet, Rfl: 1    ascorbic acid, vitamin C, (VITAMIN C) 100 MG tablet, Take 100 mg by mouth once daily., Disp: , Rfl:     blood sugar diagnostic Strp, 1 each by Misc.(Non-Drug; Combo Route) route 3 (three) times daily., Disp: 200 each, Rfl: 5    carvediloL (COREG) 25 MG tablet, Take 1 tablet (25 mg total) by mouth 2 (two) times daily with meals., Disp: 180 tablet, Rfl: 1    hydroCHLOROthiazide (HYDRODIURIL) 12.5 MG Tab, Take 1 tablet by mouth once daily, Disp: 90 tablet, Rfl: 0    insulin  unit/mL injection, Inject 50 Units into the skin 2 (two) times daily before meals. Pt states 60 u sq am  and 40 u sq in pm. And its otc insulin., Disp: , Rfl:     lisinopriL (PRINIVIL,ZESTRIL) 40 MG tablet, Take 1 tablet by mouth once daily, Disp: 90 tablet, Rfl: 0    magnesium 30 mg Tab, Take by mouth once., Disp: , Rfl:     mycophenolate (CELLCEPT) 500 mg Tab, Take 1,250 mg by mouth 2 (two) times daily. 2.5 tablets twice daily, Disp: , Rfl:     vit A/vit C/vit E/zinc/copper (PRESERVISION AREDS ORAL), Take by mouth., Disp: , Rfl:     aspirin (ECOTRIN) 81 MG EC tablet, Take 1 tablet (81 mg total) by mouth once daily. (Patient not taking: Reported on 2/29/2024), Disp: 90 tablet, Rfl: 1    fluticasone propionate (FLONASE) 50 mcg/actuation nasal spray, 2 sprays (100 mcg total) by Each Nostril route once daily. (Patient not taking: Reported on 2/29/2024), Disp: 16 g, Rfl: 5    lipase-protease-amylase 24,000-76,000-120,000 units (CREON) 24,000-76,000 -120,000 unit capsule, Take 1 capsule by mouth As instructed (4 times daily with meals & snack). (Patient not taking: Reported on 4/25/2023), Disp: 120 capsule, Rfl: 2    Current Facility-Administered Medications:     methylPREDNISolone sodium succinate injection 40 mg, 40 mg, Intravenous, Once PRN, Jacques Cali MD    Lab Results   Component Value Date     WBC 9.64 02/05/2024    HGB 15.2 02/05/2024    HCT 45.9 02/05/2024     02/05/2024    CHOL 122 04/10/2023    TRIG 91 04/10/2023    HDL 32 (L) 04/10/2023    ALT 11 02/05/2024    AST 11 02/05/2024     02/05/2024    K 4.5 02/05/2024    CL 99 02/05/2024    CREATININE 1.0 02/05/2024    BUN 25 (H) 02/05/2024    CO2 31 (H) 02/05/2024    TSH 0.816 04/25/2023    INR 1.0 08/19/2021    HGBA1C 8.6 (H) 04/10/2023    MICROALBUR 0.6 02/07/2022       Review of Systems   Constitutional: Negative.    HENT:  Positive for nasal congestion and rhinorrhea.    Respiratory:  Negative for cough, chest tightness and shortness of breath.    Cardiovascular:  Negative for chest pain, palpitations and leg swelling.       Objective:      Physical Exam  Vitals reviewed.   Constitutional:       Appearance: Normal appearance.   HENT:      Right Ear: Tympanic membrane normal.      Left Ear: Tympanic membrane normal.      Nose: Congestion present.      Mouth/Throat:      Mouth: Mucous membranes are moist.      Pharynx: Oropharynx is clear.   Eyes:      Conjunctiva/sclera: Conjunctivae normal.   Cardiovascular:      Rate and Rhythm: Normal rate and regular rhythm.      Pulses: Normal pulses.      Heart sounds: Normal heart sounds.   Pulmonary:      Effort: Pulmonary effort is normal.      Breath sounds: Normal breath sounds.   Skin:     Capillary Refill: Capillary refill takes less than 2 seconds.   Neurological:      Mental Status: He is alert.         Assessment:       1. Hypertension, essential    2. Nasal congestion        Plan:       Fazal was seen today for follow-up.    Diagnoses and all orders for this visit:    Hypertension, essential    Nasal congestion    Hypertension   - increase HCTZ to 25 mg daily.  Patient has multiple 12.5 mg tablets at home so we will double days until next visit.  - continue amlodipine 10 mg daily  - continue carvedilol 25 mg b.i.d.  - continue lisinopril 40 mg daily  - keep daily blood pressure log  -  follow-up in 2-3 weeks for reassessment.    Nasal congestion   - OTC antihistamine such as Claritin or Zyrtec  - Flonase nasal spray as directed

## 2024-03-20 ENCOUNTER — OFFICE VISIT (OUTPATIENT)
Dept: FAMILY MEDICINE | Facility: CLINIC | Age: 79
End: 2024-03-20
Payer: MEDICARE

## 2024-03-20 VITALS
HEART RATE: 50 BPM | RESPIRATION RATE: 18 BRPM | BODY MASS INDEX: 32.52 KG/M2 | DIASTOLIC BLOOD PRESSURE: 60 MMHG | TEMPERATURE: 97 F | SYSTOLIC BLOOD PRESSURE: 122 MMHG | HEIGHT: 70 IN | WEIGHT: 227.19 LBS | OXYGEN SATURATION: 97 %

## 2024-03-20 DIAGNOSIS — Z79.899 DRUG-INDUCED IMMUNODEFICIENCY: ICD-10-CM

## 2024-03-20 DIAGNOSIS — G12.20 MOTOR NEURON DISEASE, UNSPECIFIED: ICD-10-CM

## 2024-03-20 DIAGNOSIS — Z79.4 TYPE 2 DIABETES MELLITUS WITH DIABETIC POLYNEUROPATHY, WITH LONG-TERM CURRENT USE OF INSULIN: ICD-10-CM

## 2024-03-20 DIAGNOSIS — E11.42 TYPE 2 DIABETES MELLITUS WITH DIABETIC POLYNEUROPATHY, WITH LONG-TERM CURRENT USE OF INSULIN: ICD-10-CM

## 2024-03-20 DIAGNOSIS — Z79.4 TYPE 2 DIABETES MELLITUS WITH HYPERGLYCEMIA, WITH LONG-TERM CURRENT USE OF INSULIN: ICD-10-CM

## 2024-03-20 DIAGNOSIS — I10 HYPERTENSION, ESSENTIAL: Primary | ICD-10-CM

## 2024-03-20 DIAGNOSIS — I70.0 AORTIC ATHEROSCLEROSIS: ICD-10-CM

## 2024-03-20 DIAGNOSIS — D84.821 DRUG-INDUCED IMMUNODEFICIENCY: ICD-10-CM

## 2024-03-20 DIAGNOSIS — E11.65 TYPE 2 DIABETES MELLITUS WITH HYPERGLYCEMIA, WITH LONG-TERM CURRENT USE OF INSULIN: ICD-10-CM

## 2024-03-20 DIAGNOSIS — E66.01 MORBID OBESITY: ICD-10-CM

## 2024-03-20 PROCEDURE — 99999 PR PBB SHADOW E&M-EST. PATIENT-LVL III: CPT | Mod: PBBFAC,,, | Performed by: NURSE PRACTITIONER

## 2024-03-20 PROCEDURE — 99213 OFFICE O/P EST LOW 20 MIN: CPT | Mod: S$GLB,,, | Performed by: NURSE PRACTITIONER

## 2024-03-20 RX ORDER — CARVEDILOL 25 MG/1
25 TABLET ORAL 2 TIMES DAILY WITH MEALS
Qty: 180 TABLET | Refills: 1 | Status: SHIPPED | OUTPATIENT
Start: 2024-03-20 | End: 2024-04-23 | Stop reason: SDUPTHER

## 2024-03-20 RX ORDER — HYDROCHLOROTHIAZIDE 25 MG/1
25 TABLET ORAL DAILY
Qty: 90 TABLET | Refills: 1 | Status: SHIPPED | OUTPATIENT
Start: 2024-03-20 | End: 2024-04-23 | Stop reason: SDUPTHER

## 2024-03-20 RX ORDER — HYDRALAZINE HYDROCHLORIDE 25 MG/1
25 TABLET, FILM COATED ORAL 2 TIMES DAILY PRN
Qty: 90 TABLET | Refills: 1 | Status: SHIPPED | OUTPATIENT
Start: 2024-03-20 | End: 2024-04-23 | Stop reason: SDUPTHER

## 2024-03-20 NOTE — PROGRESS NOTES
SUBJECTIVE:      Patient ID: Fazal Wade is a 78 y.o. male.    Chief Complaint: Follow-up (3 week)    HPI  Is here for bp visit  Denies sob  Denies chills  Denies fever  Denies chest pain    Bp at home 160-170/60  Here it is wnl  Has bradycarida is asymptomatic    Denies headaches  Denies blurry vision      Past Surgical History:   Procedure Laterality Date    COLONOSCOPY      EYE SURGERY       Family History   Problem Relation Age of Onset    No Known Problems Mother     Diabetes Father     Hypertension Father     Colon cancer Neg Hx     Colon polyps Neg Hx     Crohn's disease Neg Hx     Esophageal cancer Neg Hx     Ulcerative colitis Neg Hx     Stomach cancer Neg Hx       Social History     Socioeconomic History    Marital status: Single   Tobacco Use    Smoking status: Never    Smokeless tobacco: Never   Substance and Sexual Activity    Alcohol use: Never    Drug use: Never    Sexual activity: Not Currently     Social Determinants of Health     Stress: No Stress Concern Present (8/4/2020)    [employer] [employer] - Occupational Stress Questionnaire     Feeling of Stress : Not at all     Current Outpatient Medications   Medication Sig Dispense Refill    amLODIPine (NORVASC) 10 MG tablet Take 1 tablet (10 mg total) by mouth once daily. 90 tablet 1    ascorbic acid, vitamin C, (VITAMIN C) 100 MG tablet Take 100 mg by mouth once daily.      blood sugar diagnostic Strp 1 each by Misc.(Non-Drug; Combo Route) route 3 (three) times daily. 200 each 5    insulin  unit/mL injection Inject 50 Units into the skin 2 (two) times daily before meals. Pt states 60 u sq am  and 40 u sq in pm. And its otc insulin.      lisinopriL (PRINIVIL,ZESTRIL) 40 MG tablet Take 1 tablet by mouth once daily 90 tablet 0    magnesium 30 mg Tab Take by mouth once.      mycophenolate (CELLCEPT) 500 mg Tab Take 1,250 mg by mouth 2 (two) times daily. 2.5 tablets twice daily      vit A/vit C/vit E/zinc/copper  "(PRESERVISION AREDS ORAL) Take by mouth.      carvediloL (COREG) 25 MG tablet Take 1 tablet (25 mg total) by mouth 2 (two) times daily with meals. 180 tablet 1    hydrALAZINE (APRESOLINE) 25 MG tablet Take 1 tablet (25 mg total) by mouth 2 (two) times daily as needed (for sbp greater than or equal to 140 and/or dbp greater than or equal to 90). 90 tablet 1    hydroCHLOROthiazide (HYDRODIURIL) 25 MG tablet Take 1 tablet (25 mg total) by mouth once daily. 90 tablet 1     Current Facility-Administered Medications   Medication Dose Route Frequency Provider Last Rate Last Admin    methylPREDNISolone sodium succinate injection 40 mg  40 mg Intravenous Once PRN Jacques Cali MD         Review of patient's allergies indicates:   Allergen Reactions    Levaquin [levofloxacin]       Past Medical History:   Diagnosis Date    Diabetes mellitus, type 2     Hyperlipidemia     Hypertension      Past Surgical History:   Procedure Laterality Date    COLONOSCOPY      EYE SURGERY         Review of Systems   All other systems reviewed and are negative.   OBJECTIVE:      Vitals:    03/20/24 0946   BP: 122/60   BP Location: Left arm   Patient Position: Sitting   BP Method: Medium (Manual)   Pulse: (!) 50   Resp: 18   Temp: 97.4 °F (36.3 °C)   SpO2: 97%   Weight: 103 kg (227 lb 2.9 oz)   Height: 5' 10" (1.778 m)     Physical Exam  Vitals and nursing note reviewed.   Constitutional:       Appearance: Normal appearance. He is obese.   HENT:      Head: Normocephalic and atraumatic.   Eyes:      Pupils: Pupils are equal, round, and reactive to light.   Cardiovascular:      Rate and Rhythm: Regular rhythm. Bradycardia present.      Comments: Asymptomatic bradycardia  Pulmonary:      Effort: Pulmonary effort is normal.      Breath sounds: Normal breath sounds.   Musculoskeletal:      Cervical back: Normal range of motion.   Skin:     General: Skin is warm and dry.   Neurological:      General: No focal deficit present.      Mental Status: " He is alert and oriented to person, place, and time. Mental status is at baseline.   Psychiatric:         Mood and Affect: Mood normal.         Behavior: Behavior normal.         Thought Content: Thought content normal.         Judgment: Judgment normal.      Comments: nonsuicidal      Assessment:       1. Hypertension, essential    2. Motor neuron disease, unspecified    3. Drug-induced immunodeficiency    4. Aortic atherosclerosis    5. Type 2 diabetes mellitus with diabetic polyneuropathy, with long-term current use of insulin    6. Type 2 diabetes mellitus with hyperglycemia, with long-term current use of insulin    7. Morbid obesity        Plan:       Hypertension, essential  -     hydroCHLOROthiazide (HYDRODIURIL) 25 MG tablet; Take 1 tablet (25 mg total) by mouth once daily.  Dispense: 90 tablet; Refill: 1  -     carvediloL (COREG) 25 MG tablet; Take 1 tablet (25 mg total) by mouth 2 (two) times daily with meals.  Dispense: 180 tablet; Refill: 1  -     hydrALAZINE (APRESOLINE) 25 MG tablet; Take 1 tablet (25 mg total) by mouth 2 (two) times daily as needed (for sbp greater than or equal to 140 and/or dbp greater than or equal to 90).  Dispense: 90 tablet; Refill: 1    Motor neuron disease, unspecified    Drug-induced immunodeficiency    Aortic atherosclerosis    Type 2 diabetes mellitus with diabetic polyneuropathy, with long-term current use of insulin    Type 2 diabetes mellitus with hyperglycemia, with long-term current use of insulin    Morbid obesity      Continue same meds  Add hydralazine 25 mg as needed  And record how many times need to take  Follow up in one month or sooner if needed  No follow-ups on file.      3/20/2024 AKIRA Peres, FNP

## 2024-03-26 RX ORDER — LISINOPRIL 40 MG/1
TABLET ORAL
Qty: 90 TABLET | Refills: 0 | Status: SHIPPED | OUTPATIENT
Start: 2024-03-26 | End: 2024-04-23 | Stop reason: SDUPTHER

## 2024-03-26 NOTE — TELEPHONE ENCOUNTER
No care due was identified.  [facility] Embedded Care Due Messages. Reference number: [id number].   3/26/2024 10:10:25 AM CDT

## 2024-04-05 ENCOUNTER — LAB VISIT (OUTPATIENT)
Dept: LAB | Facility: HOSPITAL | Age: 79
End: 2024-04-05
Attending: PHYSICIAN ASSISTANT
Payer: MEDICARE

## 2024-04-05 DIAGNOSIS — E55.9 HYPOVITAMINOSIS D: ICD-10-CM

## 2024-04-05 DIAGNOSIS — E11.42 TYPE 2 DIABETES MELLITUS WITH DIABETIC POLYNEUROPATHY, WITH LONG-TERM CURRENT USE OF INSULIN: ICD-10-CM

## 2024-04-05 DIAGNOSIS — Z79.4 TYPE 2 DIABETES MELLITUS WITH DIABETIC POLYNEUROPATHY, WITH LONG-TERM CURRENT USE OF INSULIN: ICD-10-CM

## 2024-04-05 DIAGNOSIS — E53.8 VITAMIN B12 DEFICIENCY: ICD-10-CM

## 2024-04-05 LAB
25(OH)D3+25(OH)D2 SERPL-MCNC: 26 NG/ML (ref 30–96)
ALBUMIN SERPL BCP-MCNC: 3.6 G/DL (ref 3.5–5.2)
ALP SERPL-CCNC: 55 U/L (ref 55–135)
ALT SERPL W/O P-5'-P-CCNC: 10 U/L (ref 10–44)
ANION GAP SERPL CALC-SCNC: 10 MMOL/L (ref 8–16)
AST SERPL-CCNC: 10 U/L (ref 10–40)
BILIRUB SERPL-MCNC: 0.5 MG/DL (ref 0.1–1)
BUN SERPL-MCNC: 17 MG/DL (ref 8–23)
CALCIUM SERPL-MCNC: 9.2 MG/DL (ref 8.7–10.5)
CHLORIDE SERPL-SCNC: 101 MMOL/L (ref 95–110)
CHOLEST SERPL-MCNC: 122 MG/DL (ref 120–199)
CHOLEST/HDLC SERPL: 4.2 {RATIO} (ref 2–5)
CO2 SERPL-SCNC: 26 MMOL/L (ref 23–29)
CREAT SERPL-MCNC: 0.9 MG/DL (ref 0.5–1.4)
EST. GFR  (NO RACE VARIABLE): >60 ML/MIN/1.73 M^2
ESTIMATED AVG GLUCOSE: 192 MG/DL (ref 68–131)
GLUCOSE SERPL-MCNC: 205 MG/DL (ref 70–110)
HBA1C MFR BLD: 8.3 % (ref 4–5.6)
HDLC SERPL-MCNC: 29 MG/DL (ref 40–75)
HDLC SERPL: 23.8 % (ref 20–50)
LDLC SERPL CALC-MCNC: 73.8 MG/DL (ref 63–159)
NONHDLC SERPL-MCNC: 93 MG/DL
POTASSIUM SERPL-SCNC: 4 MMOL/L (ref 3.5–5.1)
PROT SERPL-MCNC: 6.4 G/DL (ref 6–8.4)
SODIUM SERPL-SCNC: 137 MMOL/L (ref 136–145)
T4 FREE SERPL-MCNC: 0.96 NG/DL (ref 0.71–1.51)
TRIGL SERPL-MCNC: 96 MG/DL (ref 30–150)
TSH SERPL DL<=0.005 MIU/L-ACNC: 1.14 UIU/ML (ref 0.4–4)
VIT B12 SERPL-MCNC: 396 PG/ML (ref 210–950)

## 2024-04-05 PROCEDURE — 84439 ASSAY OF FREE THYROXINE: CPT | Performed by: PHYSICIAN ASSISTANT

## 2024-04-05 PROCEDURE — 84443 ASSAY THYROID STIM HORMONE: CPT | Performed by: PHYSICIAN ASSISTANT

## 2024-04-05 PROCEDURE — 82607 VITAMIN B-12: CPT | Performed by: PHYSICIAN ASSISTANT

## 2024-04-05 PROCEDURE — 83036 HEMOGLOBIN GLYCOSYLATED A1C: CPT | Performed by: PHYSICIAN ASSISTANT

## 2024-04-05 PROCEDURE — 80061 LIPID PANEL: CPT | Performed by: PHYSICIAN ASSISTANT

## 2024-04-05 PROCEDURE — 80053 COMPREHEN METABOLIC PANEL: CPT | Performed by: PHYSICIAN ASSISTANT

## 2024-04-05 PROCEDURE — 36415 COLL VENOUS BLD VENIPUNCTURE: CPT | Mod: PO | Performed by: PHYSICIAN ASSISTANT

## 2024-04-05 PROCEDURE — 82306 VITAMIN D 25 HYDROXY: CPT | Performed by: PHYSICIAN ASSISTANT

## 2024-04-10 ENCOUNTER — OFFICE VISIT (OUTPATIENT)
Dept: ENDOCRINOLOGY | Facility: CLINIC | Age: 79
End: 2024-04-10
Payer: MEDICARE

## 2024-04-10 VITALS
DIASTOLIC BLOOD PRESSURE: 62 MMHG | HEART RATE: 57 BPM | HEIGHT: 70 IN | SYSTOLIC BLOOD PRESSURE: 130 MMHG | BODY MASS INDEX: 33.62 KG/M2 | TEMPERATURE: 98 F | OXYGEN SATURATION: 94 % | WEIGHT: 234.81 LBS

## 2024-04-10 DIAGNOSIS — E11.69 COMBINED HYPERLIPIDEMIA ASSOCIATED WITH TYPE 2 DIABETES MELLITUS: ICD-10-CM

## 2024-04-10 DIAGNOSIS — E11.65 TYPE 2 DIABETES MELLITUS WITH HYPERGLYCEMIA, WITH LONG-TERM CURRENT USE OF INSULIN: Primary | ICD-10-CM

## 2024-04-10 DIAGNOSIS — E11.42 TYPE 2 DIABETES MELLITUS WITH DIABETIC POLYNEUROPATHY, WITH LONG-TERM CURRENT USE OF INSULIN: ICD-10-CM

## 2024-04-10 DIAGNOSIS — Z79.4 TYPE 2 DIABETES MELLITUS WITH HYPERGLYCEMIA, WITH LONG-TERM CURRENT USE OF INSULIN: Primary | ICD-10-CM

## 2024-04-10 DIAGNOSIS — Z79.4 TYPE 2 DIABETES MELLITUS WITH DIABETIC POLYNEUROPATHY, WITH LONG-TERM CURRENT USE OF INSULIN: ICD-10-CM

## 2024-04-10 DIAGNOSIS — E78.2 COMBINED HYPERLIPIDEMIA ASSOCIATED WITH TYPE 2 DIABETES MELLITUS: ICD-10-CM

## 2024-04-10 DIAGNOSIS — I10 HYPERTENSION, ESSENTIAL: ICD-10-CM

## 2024-04-10 LAB — GLUCOSE SERPL-MCNC: 100 MG/DL (ref 70–110)

## 2024-04-10 PROCEDURE — 1126F AMNT PAIN NOTED NONE PRSNT: CPT | Mod: CPTII,S$GLB,, | Performed by: PHYSICIAN ASSISTANT

## 2024-04-10 PROCEDURE — 3288F FALL RISK ASSESSMENT DOCD: CPT | Mod: CPTII,S$GLB,, | Performed by: PHYSICIAN ASSISTANT

## 2024-04-10 PROCEDURE — 1101F PT FALLS ASSESS-DOCD LE1/YR: CPT | Mod: CPTII,S$GLB,, | Performed by: PHYSICIAN ASSISTANT

## 2024-04-10 PROCEDURE — 1160F RVW MEDS BY RX/DR IN RCRD: CPT | Mod: CPTII,S$GLB,, | Performed by: PHYSICIAN ASSISTANT

## 2024-04-10 PROCEDURE — 99213 OFFICE O/P EST LOW 20 MIN: CPT | Mod: S$GLB,,, | Performed by: PHYSICIAN ASSISTANT

## 2024-04-10 PROCEDURE — 1159F MED LIST DOCD IN RCRD: CPT | Mod: CPTII,S$GLB,, | Performed by: PHYSICIAN ASSISTANT

## 2024-04-10 PROCEDURE — 3075F SYST BP GE 130 - 139MM HG: CPT | Mod: CPTII,S$GLB,, | Performed by: PHYSICIAN ASSISTANT

## 2024-04-10 PROCEDURE — 3078F DIAST BP <80 MM HG: CPT | Mod: CPTII,S$GLB,, | Performed by: PHYSICIAN ASSISTANT

## 2024-04-10 PROCEDURE — 82962 GLUCOSE BLOOD TEST: CPT | Mod: S$GLB,,, | Performed by: PHYSICIAN ASSISTANT

## 2024-04-10 PROCEDURE — 99999 PR PBB SHADOW E&M-EST. PATIENT-LVL IV: CPT | Mod: PBBFAC,,, | Performed by: PHYSICIAN ASSISTANT

## 2024-04-10 RX ORDER — SIMVASTATIN 20 MG/1
20 TABLET, FILM COATED ORAL NIGHTLY
COMMUNITY
End: 2024-04-23 | Stop reason: SDUPTHER

## 2024-04-10 RX ORDER — BLOOD-GLUCOSE SENSOR
EACH MISCELLANEOUS
Qty: 3 EACH | Refills: 11 | Status: SHIPPED | OUTPATIENT
Start: 2024-04-10 | End: 2024-04-10

## 2024-04-10 NOTE — PROGRESS NOTES
"CC: This 78 y.o. male presents for management of T2DM  and chronic conditions pending review including HTN, HLP    HPI: was diagnosed with T2DM in  ~30 years ago.   Has never been hospitalized r/t DM.  Family hx of DM: father  Fhx of thyroid disease: none  Denies missing doses of DM medication.   hypoglycemia at home  monitoring BG at home:  Fastin  BT: 200s    Diet:   3 am BF-hog head cheese  70-13-AC-ground meat, rice  DN-stuffed tomato, bell pepper  No snacks.   Avoids sugary beverages.     Exercise: Plays pool six times weekly in [employer]. Yard work.     CURRENT DM MEDS: Novolin N 30 units am and 20 u pm    Previous meds:  Glyburide-GI upset  Metformin- fecal incontinence    Standards of Care:  Eye exam: last year at [facility]  Podiatry exam: PCP  DE: At diagnosis    PMHx, PSHx: reviewed in epic.  Social Hx: no E/T use.    Wt Readings from Last 10 Encounters:   04/10/24 106.5 kg (234 lb 12.6 oz)   24 103 kg (227 lb 2.9 oz)   24 105.2 kg (231 lb 14.4 oz)   24 105.3 kg (232 lb 0.6 oz)   23 106.1 kg (233 lb 14.5 oz)   23 106.5 kg (234 lb 12.6 oz)   22 108.9 kg (240 lb)   22 98.2 kg (216 lb 6.4 oz)   22 106.9 kg (235 lb 9.6 oz)   22 102.1 kg (225 lb)      ROS:   Gen: Appetite good, no weight gain or loss, denies fatigue and weakness.  Skin: Skin is intact and heals well, no rashes, no hair changes  Eyes: Denies visual disturbances  Resp: no SOB or MELO, no cough  Cardiac: No palpitations, chest pain, no edema   GI: No nausea or vomiting, diarrhea, constipation, or abdominal pain.  /GYN: No nocturia, burning or pain.   MS/Neuro: Denies numbness/ tingling in BLE; Gait steady, speech clear  Psych: Denies drug/ETOH abuse, no hx of depression.  Other systems: negative.    /62 (BP Location: Right arm, Patient Position: Sitting, BP Method: Large (Manual))   Pulse (!) 57   Temp 98.4 °F (36.9 °C) (Oral)   Ht 5' 10" (1.778 m)   Wt 106.5 kg (234 lb " 12.6 oz)   SpO2 (!) 94%   BMI 33.69 kg/m²      PE:  GENERAL: Well developed, well nourished.  PSYCH: AAOx3, appropriate mood and affect, pleasant expression, conversant, appears relaxed, well groomed.   EYES: Conjunctiva, corneas clear  CHEST: Resp even and unlabored, CTA bilateral.  VASCULAR: DP pulses +2/4 bilaterally, no edema.  NEURO: Gait steady  SKIN: Skin warm and dry no acanthosis nigracans.  4/24  Foot Exam: no sores or macerations noted.     Protective Sensation (w/ 10 gram monofilament):  Right: Intact  Left: Intact    Visual Inspection:  Normal -  Bilateral, Nails Intact - without Evidence of Foot Deformity- Bilateral, and Dry Skin -  Bilateral    Pedal Pulses:   Right: Present  Left: Present    Posterior Tibialis Pulses:   Right:Present  Left: Present     Vibratory Sensation  Right:Positive  Left:Positive     Personally reviewed labs below:    Lab Visit on 04/05/2024   Component Date Value Ref Range Status    Microalbumin, Urine 04/05/2024 14.0  ug/mL Final    Creatinine, Urine 04/05/2024 66.0  23.0 - 375.0 mg/dL Final    Microalb/Creat Ratio 04/05/2024 21.2  0.0 - 30.0 ug/mg Final   Lab Visit on 04/05/2024   Component Date Value Ref Range Status    Sodium 04/05/2024 137  136 - 145 mmol/L Final    Potassium 04/05/2024 4.0  3.5 - 5.1 mmol/L Final    Chloride 04/05/2024 101  95 - 110 mmol/L Final    CO2 04/05/2024 26  23 - 29 mmol/L Final    Glucose 04/05/2024 205 (H)  70 - 110 mg/dL Final    BUN 04/05/2024 17  8 - 23 mg/dL Final    Creatinine 04/05/2024 0.9  0.5 - 1.4 mg/dL Final    Calcium 04/05/2024 9.2  8.7 - 10.5 mg/dL Final    Total Protein 04/05/2024 6.4  6.0 - 8.4 g/dL Final    Albumin 04/05/2024 3.6  3.5 - 5.2 g/dL Final    Total Bilirubin 04/05/2024 0.5  0.1 - 1.0 mg/dL Final    Comment: For infants and newborns, interpretation of results should be based  on gestational age, weight and in agreement with clinical  observations.    Premature Infant recommended reference ranges:  Up to 24  hours.............<8.0 mg/dL  Up to 48 hours............<12.0 mg/dL  3-5 days..................<15.0 mg/dL  6-29 days.................<15.0 mg/dL      Alkaline Phosphatase 04/05/2024 55  55 - 135 U/L Final    AST 04/05/2024 10  10 - 40 U/L Final    ALT 04/05/2024 10  10 - 44 U/L Final    eGFR 04/05/2024 >60.0  >60 mL/min/1.73 m^2 Final    Anion Gap 04/05/2024 10  8 - 16 mmol/L Final    Hemoglobin A1C 04/05/2024 8.3 (H)  4.0 - 5.6 % Final    Comment: ADA Screening Guidelines:  5.7-6.4%  Consistent with prediabetes  >or=6.5%  Consistent with diabetes    High levels of fetal hemoglobin interfere with the HbA1C  assay. Heterozygous hemoglobin variants (HbS, HgC, etc)do  not significantly interfere with this assay.   However, presence of multiple variants may affect accuracy.      Estimated Avg Glucose 04/05/2024 192 (H)  68 - 131 mg/dL Final    Cholesterol 04/05/2024 122  120 - 199 mg/dL Final    Comment: The National Cholesterol Education Program (NCEP) has set the  following guidelines (reference ranges) for Cholesterol:  Optimal.....................<200 mg/dL  Borderline High.............200-239 mg/dL  High........................> or = 240 mg/dL      Triglycerides 04/05/2024 96  30 - 150 mg/dL Final    Comment: The National Cholesterol Education Program (NCEP) has set the  following guidelines (reference values) for triglycerides:  Normal......................<150 mg/dL  Borderline High.............150-199 mg/dL  High........................200-499 mg/dL      HDL 04/05/2024 29 (L)  40 - 75 mg/dL Final    Comment: The National Cholesterol Education Program (NCEP) has set the  following guidelines (reference values) for HDL Cholesterol:  Low...............<40 mg/dL  Optimal...........>60 mg/dL      LDL Cholesterol 04/05/2024 73.8  63.0 - 159.0 mg/dL Final    Comment: The National Cholesterol Education Program (NCEP) has set the  following guidelines (reference values) for LDL  Cholesterol:  Optimal.......................<130 mg/dL  Borderline High...............130-159 mg/dL  High..........................160-189 mg/dL  Very High.....................>190 mg/dL      HDL/Cholesterol Ratio 04/05/2024 23.8  20.0 - 50.0 % Final    Total Cholesterol/HDL Ratio 04/05/2024 4.2  2.0 - 5.0 Final    Non-HDL Cholesterol 04/05/2024 93  mg/dL Final    Comment: Risk category and Non-HDL cholesterol goals:  Coronary heart disease (CHD)or equivalent (10-year risk of CHD >20%):  Non-HDL cholesterol goal     <130 mg/dL  Two or more CHD risk factors and 10-year risk of CHD <= 20%:  Non-HDL cholesterol goal     <160 mg/dL  0 to 1 CHD risk factor:  Non-HDL cholesterol goal     <190 mg/dL      TSH 04/05/2024 1.142  0.400 - 4.000 uIU/mL Final    Free T4 04/05/2024 0.96  0.71 - 1.51 ng/dL Final    Vit D, 25-Hydroxy 04/05/2024 26 (L)  30 - 96 ng/mL Final    Comment: Vitamin D deficiency.........<10 ng/mL                              Vitamin D insufficiency......10-29 ng/mL       Vitamin D sufficiency........> or equal to 30 ng/mL  Vitamin D toxicity............>100 ng/mL      Vitamin B-12 04/05/2024 396  210 - 950 pg/mL Final         ASSESSMENT and PLAN:    1. Type 2 diabetes mellitus with diabetic polyneuropathy, with long-term current use of insulin  POCT Glucose, Hand-Held Device    Renal Function Panel    Hemoglobin A1C    DISCONTINUED: DEXCOM G7 SENSOR Josie        T2DM with hyperglycemia-  A1c is elevated.  Declines changing regimen.  Discussed adding Novolin R.    Discussed DM, progression of disease, long term complications, tx options.   Discussed A1c and BG goals.   Reviewed  hypoglycemia, s/s and appropriate tx.   Instructed to monitor BG and bring meter/ log to every clinic visit.   - takes ASA, ACEi, statin    HTN - controlled, continue meds as previously prescribed and monitor.     HLP - LDL , on statin therapy, LFTs WNL         Follow-Up   Rajeev 3--[facility]   F/u in 4 mths  w/ labs prior-  A1c, rp

## 2024-04-23 ENCOUNTER — OFFICE VISIT (OUTPATIENT)
Dept: FAMILY MEDICINE | Facility: CLINIC | Age: 79
End: 2024-04-23
Payer: MEDICARE

## 2024-04-23 VITALS — BODY MASS INDEX: 33.69 KG/M2 | DIASTOLIC BLOOD PRESSURE: 82 MMHG | SYSTOLIC BLOOD PRESSURE: 134 MMHG | HEIGHT: 70 IN

## 2024-04-23 DIAGNOSIS — E11.42 TYPE 2 DIABETES MELLITUS WITH DIABETIC POLYNEUROPATHY, WITH LONG-TERM CURRENT USE OF INSULIN: Primary | ICD-10-CM

## 2024-04-23 DIAGNOSIS — E78.5 HYPERLIPIDEMIA, UNSPECIFIED HYPERLIPIDEMIA TYPE: ICD-10-CM

## 2024-04-23 DIAGNOSIS — I10 HYPERTENSION, ESSENTIAL: ICD-10-CM

## 2024-04-23 DIAGNOSIS — Z79.4 TYPE 2 DIABETES MELLITUS WITH DIABETIC POLYNEUROPATHY, WITH LONG-TERM CURRENT USE OF INSULIN: Primary | ICD-10-CM

## 2024-04-23 DIAGNOSIS — G70.00 MYASTHENIA GRAVIS: ICD-10-CM

## 2024-04-23 PROCEDURE — 3288F FALL RISK ASSESSMENT DOCD: CPT | Mod: CPTII,S$GLB,, | Performed by: NURSE PRACTITIONER

## 2024-04-23 PROCEDURE — 99214 OFFICE O/P EST MOD 30 MIN: CPT | Mod: S$GLB,,, | Performed by: NURSE PRACTITIONER

## 2024-04-23 PROCEDURE — 3075F SYST BP GE 130 - 139MM HG: CPT | Mod: CPTII,S$GLB,, | Performed by: NURSE PRACTITIONER

## 2024-04-23 PROCEDURE — 1126F AMNT PAIN NOTED NONE PRSNT: CPT | Mod: CPTII,S$GLB,, | Performed by: NURSE PRACTITIONER

## 2024-04-23 PROCEDURE — 1160F RVW MEDS BY RX/DR IN RCRD: CPT | Mod: CPTII,S$GLB,, | Performed by: NURSE PRACTITIONER

## 2024-04-23 PROCEDURE — 3079F DIAST BP 80-89 MM HG: CPT | Mod: CPTII,S$GLB,, | Performed by: NURSE PRACTITIONER

## 2024-04-23 PROCEDURE — 1159F MED LIST DOCD IN RCRD: CPT | Mod: CPTII,S$GLB,, | Performed by: NURSE PRACTITIONER

## 2024-04-23 PROCEDURE — 99999 PR PBB SHADOW E&M-EST. PATIENT-LVL III: CPT | Mod: PBBFAC,,, | Performed by: NURSE PRACTITIONER

## 2024-04-23 PROCEDURE — 1101F PT FALLS ASSESS-DOCD LE1/YR: CPT | Mod: CPTII,S$GLB,, | Performed by: NURSE PRACTITIONER

## 2024-04-23 RX ORDER — LISINOPRIL 40 MG/1
40 TABLET ORAL DAILY
Qty: 90 TABLET | Refills: 1 | Status: SHIPPED | OUTPATIENT
Start: 2024-04-23

## 2024-04-23 RX ORDER — HYDROCHLOROTHIAZIDE 25 MG/1
25 TABLET ORAL DAILY
Qty: 90 TABLET | Refills: 1 | Status: SHIPPED | OUTPATIENT
Start: 2024-04-23 | End: 2025-04-23

## 2024-04-23 RX ORDER — MYCOPHENOLATE MOFETIL 500 MG/1
1250 TABLET ORAL 2 TIMES DAILY
Qty: 180 TABLET | Refills: 1 | Status: SHIPPED | OUTPATIENT
Start: 2024-04-23

## 2024-04-23 RX ORDER — CARVEDILOL 25 MG/1
25 TABLET ORAL 2 TIMES DAILY WITH MEALS
Qty: 180 TABLET | Refills: 1 | Status: SHIPPED | OUTPATIENT
Start: 2024-04-23 | End: 2025-04-23

## 2024-04-23 RX ORDER — SIMVASTATIN 20 MG/1
20 TABLET, FILM COATED ORAL NIGHTLY
Qty: 90 TABLET | Refills: 1 | Status: SHIPPED | OUTPATIENT
Start: 2024-04-23

## 2024-04-23 RX ORDER — HYDRALAZINE HYDROCHLORIDE 25 MG/1
25 TABLET, FILM COATED ORAL DAILY
Qty: 90 TABLET | Refills: 1 | Status: SHIPPED | OUTPATIENT
Start: 2024-04-23 | End: 2025-04-23

## 2024-04-23 NOTE — PROGRESS NOTES
SUBJECTIVE:      Patient ID: Fazal Wade is a 78 y.o. male.    Chief Complaint: Follow-up (BP check )    HPI  Bp check  Has been taking hydralazine 25 mg once daily  Is also on hctz 25 mg coreg 25 (bid) lisinopril 40 norvasc 10  States at home sbp is never over 170 and dbp is in 60s  Is unsure of what bp usually runs at home    Bp is good today  Will keep medications the same and keep taking the hydralazine once per day as well    Needs refill on medications: insulin 30 in am and 20 in evening; simvastatin; and cellcept    Vss; bp is well controlled  Denies chest pain  Denies sob  Denies fever  Denies chills    Past Surgical History:   Procedure Laterality Date    COLONOSCOPY      EYE SURGERY       Family History   Problem Relation Name Age of Onset    No Known Problems Mother      Diabetes Father      Hypertension Father      Colon cancer Neg Hx      Colon polyps Neg Hx      Crohn's disease Neg Hx      Esophageal cancer Neg Hx      Ulcerative colitis Neg Hx      Stomach cancer Neg Hx        Social History     Socioeconomic History    Marital status: Single   Tobacco Use    Smoking status: Never    Smokeless tobacco: Never   Substance and Sexual Activity    Alcohol use: Never    Drug use: Never    Sexual activity: Not Currently     Social Determinants of Health     Stress: No Stress Concern Present (8/4/2020)    [employer] - Occupational Stress Questionnaire     Feeling of Stress : Not at all     Current Outpatient Medications   Medication Sig Dispense Refill    amLODIPine (NORVASC) 10 MG tablet Take 1 tablet (10 mg total) by mouth once daily. 90 tablet 1    ascorbic acid, vitamin C, (VITAMIN C) 100 MG tablet Take 100 mg by mouth once daily.      blood sugar diagnostic Strp 1 each by Misc.(Non-Drug; Combo Route) route 3 (three) times daily. 200 each 5    magnesium 30 mg Tab Take by mouth once.      vit A/vit C/vit E/zinc/copper (PRESERVISION AREDS ORAL) Take by mouth.       "carvediloL (COREG) 25 MG tablet Take 1 tablet (25 mg total) by mouth 2 (two) times daily with meals. 180 tablet 1    hydrALAZINE (APRESOLINE) 25 MG tablet Take 1 tablet (25 mg total) by mouth once daily. 90 tablet 1    hydroCHLOROthiazide (HYDRODIURIL) 25 MG tablet Take 1 tablet (25 mg total) by mouth once daily. 90 tablet 1    insulin  unit/mL injection Inject 30 Units into the skin 2 (two) times daily before meals. Pt states 60 u sq am  and 40 u sq in pm. And its otc insulin. 10 mL 1    lisinopriL (PRINIVIL,ZESTRIL) 40 MG tablet Take 1 tablet (40 mg total) by mouth once daily. 90 tablet 1    mycophenolate (CELLCEPT) 500 mg Tab Take 3 tablets (1,500 mg total) by mouth 2 (two) times daily. 2.5 tablets twice daily 180 tablet 1    simvastatin (ZOCOR) 20 MG tablet Take 1 tablet (20 mg total) by mouth every evening. 90 tablet 1     Current Facility-Administered Medications   Medication Dose Route Frequency Provider Last Rate Last Admin    methylPREDNISolone sodium succinate injection 40 mg  40 mg Intravenous Once PRN Jacques Cali MD         Review of patient's allergies indicates:   Allergen Reactions    Levaquin [levofloxacin]       Past Medical History:   Diagnosis Date    Diabetes mellitus, type 2     Hyperlipidemia     Hypertension      Past Surgical History:   Procedure Laterality Date    COLONOSCOPY      EYE SURGERY         Review of Systems   All other systems reviewed and are negative.   OBJECTIVE:      Vitals:    04/23/24 0944   BP: 134/82   BP Location: Right arm   Patient Position: Sitting   BP Method: Large (Manual)   Height: 5' 10" (1.778 m)     Physical Exam  Vitals and nursing note reviewed.   Constitutional:       Appearance: Normal appearance.   HENT:      Head: Normocephalic and atraumatic.   Eyes:      Pupils: Pupils are equal, round, and reactive to light.   Cardiovascular:      Rate and Rhythm: Normal rate and regular rhythm.      Pulses: Normal pulses.      Heart sounds: Normal heart " sounds.   Pulmonary:      Effort: Pulmonary effort is normal.      Breath sounds: Normal breath sounds.   Musculoskeletal:      Cervical back: Normal range of motion.   Skin:     General: Skin is warm and dry.   Neurological:      General: No focal deficit present.      Mental Status: He is alert and oriented to person, place, and time. Mental status is at baseline.   Psychiatric:         Mood and Affect: Mood normal.         Behavior: Behavior normal.         Thought Content: Thought content normal.         Judgment: Judgment normal.      Comments: nonsuicidal      Assessment:       1. Type 2 diabetes mellitus with diabetic polyneuropathy, with long-term current use of insulin    2. Hypertension, essential    3. Hyperlipidemia, unspecified hyperlipidemia type    4. Myasthenia gravis        Plan:       Type 2 diabetes mellitus with diabetic polyneuropathy, with long-term current use of insulin  -     insulin  unit/mL injection; Inject 30 Units into the skin 2 (two) times daily before meals. Pt states 60 u sq am  and 40 u sq in pm. And its otc insulin.  Dispense: 10 mL; Refill: 1  -     Hemoglobin A1C; Future; Expected date: 04/23/2024    Hypertension, essential  -     lisinopriL (PRINIVIL,ZESTRIL) 40 MG tablet; Take 1 tablet (40 mg total) by mouth once daily.  Dispense: 90 tablet; Refill: 1  -     hydroCHLOROthiazide (HYDRODIURIL) 25 MG tablet; Take 1 tablet (25 mg total) by mouth once daily.  Dispense: 90 tablet; Refill: 1  -     carvediloL (COREG) 25 MG tablet; Take 1 tablet (25 mg total) by mouth 2 (two) times daily with meals.  Dispense: 180 tablet; Refill: 1  -     hydrALAZINE (APRESOLINE) 25 MG tablet; Take 1 tablet (25 mg total) by mouth once daily.  Dispense: 90 tablet; Refill: 1  -     Comprehensive Metabolic Panel; Future; Expected date: 04/23/2024    Hyperlipidemia, unspecified hyperlipidemia type  -     simvastatin (ZOCOR) 20 MG tablet; Take 1 tablet (20 mg total) by mouth every evening.   Dispense: 90 tablet; Refill: 1  -     Lipid Panel; Future; Expected date: 04/23/2024    Myasthenia gravis  -     mycophenolate (CELLCEPT) 500 mg Tab; Take 3 tablets (1,500 mg total) by mouth 2 (two) times daily. 2.5 tablets twice daily  Dispense: 180 tablet; Refill: 1  -     CBC Auto Differential; Future; Expected date: 04/23/2024      Continue current medications  Get fasting labs done at [facility]  Follow up routine    No follow-ups on file.      4/23/2024 AKIRA Peres, FNP

## 2024-04-25 ENCOUNTER — TELEPHONE (OUTPATIENT)
Dept: ENDOCRINOLOGY | Facility: CLINIC | Age: 79
End: 2024-04-25
Payer: MEDICARE

## 2024-04-30 ENCOUNTER — TELEPHONE (OUTPATIENT)
Dept: FAMILY MEDICINE | Facility: CLINIC | Age: 79
End: 2024-04-30
Payer: MEDICARE

## 2024-04-30 NOTE — TELEPHONE ENCOUNTER
----- Message from Marlena Carney sent at 4/30/2024 11:03 AM CDT -----  Type: Needs Medical Advice  Who Called:  patient  Best Call Back Number: [phone] (home)   Additional Information: patient said he has wrong instructions on the hydrALAZINE (APRESOLINE) 25 MG tablet- one container says once a day and the other container says twice a day

## 2024-05-03 ENCOUNTER — HOSPITAL ENCOUNTER (EMERGENCY)
Facility: HOSPITAL | Age: 79
Discharge: HOME | End: 2024-05-03
Attending: EMERGENCY MEDICINE
Payer: MEDICARE

## 2024-05-03 VITALS
BODY MASS INDEX: 32.93 KG/M2 | TEMPERATURE: 98 F | SYSTOLIC BLOOD PRESSURE: 165 MMHG | DIASTOLIC BLOOD PRESSURE: 74 MMHG | WEIGHT: 230 LBS | HEIGHT: 70 IN | HEART RATE: 61 BPM | OXYGEN SATURATION: 96 % | RESPIRATION RATE: 20 BRPM

## 2024-05-03 DIAGNOSIS — S90.01XA CONTUSION OF RIGHT ANKLE, INITIAL ENCOUNTER: Primary | ICD-10-CM

## 2024-05-03 DIAGNOSIS — M25.579 ANKLE PAIN: ICD-10-CM

## 2024-05-03 PROCEDURE — 99283 EMERGENCY DEPT VISIT LOW MDM: CPT | Mod: 25

## 2024-05-03 NOTE — DISCHARGE INSTRUCTIONS
Use the compression sock as needed.  Elevate the leg to help with swelling.  Follow up with orthopedics if symptoms persist.  Return for any new or worsening symptoms.

## 2024-05-03 NOTE — ED PROVIDER NOTES
Encounter Date: 5/3/2024       History     Chief Complaint   Patient presents with    Leg Pain     Right leg pain after hitting leg on lounge chair last Saturday.      Patient is a 78 year old male who presents with right ankle pain after hitting it on a chair about a week ago. Has PMH significant for DM, hypertension and hyperlipidemia. He reports associated swelling and bruising. He has been wearing a compression sock for symptoms.     The history is provided by the patient.     Review of patient's allergies indicates:   Allergen Reactions    Levaquin [levofloxacin]      Past Medical History:   Diagnosis Date    Diabetes mellitus, type 2     Hyperlipidemia     Hypertension      Past Surgical History:   Procedure Laterality Date    COLONOSCOPY      EYE SURGERY       Family History   Problem Relation Name Age of Onset    No Known Problems Mother      Diabetes Father      Hypertension Father      Colon cancer Neg Hx      Colon polyps Neg Hx      Crohn's disease Neg Hx      Esophageal cancer Neg Hx      Ulcerative colitis Neg Hx      Stomach cancer Neg Hx       Social History     Tobacco Use    Smoking status: Never    Smokeless tobacco: Never   Substance Use Topics    Alcohol use: Never    Drug use: Never     Review of Systems   Constitutional:  Negative for fever.   Respiratory:  Negative for shortness of breath.    Genitourinary:  Negative for flank pain.   Musculoskeletal:  Positive for arthralgias and joint swelling. Negative for gait problem.   Skin:  Positive for wound.   Neurological:  Negative for weakness.   Psychiatric/Behavioral:  Negative for confusion.        Physical Exam     Initial Vitals   BP Pulse Resp Temp SpO2   05/03/24 1540 05/03/24 1538 05/03/24 1538 05/03/24 1538 05/03/24 1538   (!) 165/74 61 20 98.2 °F (36.8 °C) 96 %      MAP       --                Physical Exam    Nursing note and vitals reviewed.  Constitutional: He appears well-developed and well-nourished. He is not diaphoretic. No  distress.   Cardiovascular:  Intact distal pulses.           Musculoskeletal:         General: Tenderness present. No edema. Normal range of motion.      Comments: Tenderness and swelling to the right ankle with point tenderness to the lateral mallelous. Ecchymosis noted. 2+ pedal pulse. Able to plantar and dorsiflex the foot. No calf tenderness.     Neurological: He is alert and oriented to person, place, and time. He has normal strength. No sensory deficit.   Skin: Skin is warm and dry. No rash and no abscess noted. No erythema.   Psychiatric: He has a normal mood and affect.         ED Course   Procedures  Labs Reviewed - No data to display       Imaging Results              X-Ray Ankle Complete Right (Final result)  Result time 05/03/24 16:43:11      Final result by Jarret Arambula MD (05/03/24 16:43:11)                   Impression:      Soft tissue swelling without acute osseous abnormality.      Electronically signed by: Jarret Arambula MD  Date:    05/03/2024  Time:    16:43               Narrative:    EXAMINATION:  XR ANKLE COMPLETE 3 VIEW RIGHT    CLINICAL HISTORY:  Pain in unspecified ankle and joints of unspecified foot    TECHNIQUE:  AP, lateral, and oblique images of the right ankle were performed.    COMPARISON:  None    FINDINGS:  No fracture or dislocation.  There is moderate generalized soft tissue swelling about the ankle most pronounced laterally.  There are small dorsal and plantar heel spurs.                                       Medications - No data to display  Medical Decision Making  Emergent evaluation of a 78-year-old male who presents with right ankle pain after hitting it on a chair about a week ago.  He has some tenderness, swelling and ecchymosis.  He has 2+ pedal pulse.  Normal sensation.  No calf tenderness.  X-ray showed no acute fracture.  Discussed close follow up Orthopedics symptoms persist.  No erythema, warmth or concern for infectious process.  Normal sensation.   There is no skin breaks or wounds noted. Discussed results with patient. Return precautions given. Based on my clinical evaluation, I do not appreciate any immediate, emergent, or life threatening condition or etiology that warrants additional workup today and feel that the patient can be discharged with close follow up care.  Patient is to follow up with their primary care provider. Case was discussed with Dr. Dale who is in agreement with the plan of care. All questions answered.       Amount and/or Complexity of Data Reviewed  External Data Reviewed: labs, radiology and notes.  Radiology: ordered.                                      Clinical Impression:  Final diagnoses:  [M25.579] Ankle pain  [S90.01XA] Contusion of right ankle, initial encounter (Primary)          ED Disposition Condition    Discharge Stable          ED Prescriptions    None       Follow-up Information       Follow up With Specialties Details Why Contact Info Additional Information    Sylvester Lugo III, MD Family Medicine   [address] [place] [address]  [address]  [place] [address]  [phone]       Dariusz Ocampo MD Orthopedic Surgery Schedule an appointment as soon as possible for a visit   [address] [place] [address] Dr [place] [address]  [phone]       [facility] -  Emergency Medicine  As needed [address] [place] [address] Dr [facility] [id number] 1st floor             Vianney Wild PA-C  05/03/24 1379

## 2024-05-07 ENCOUNTER — PATIENT OUTREACH (OUTPATIENT)
Dept: EMERGENCY MEDICINE | Facility: OTHER | Age: 79
End: 2024-05-07
Payer: MEDICARE

## 2024-05-22 ENCOUNTER — TELEPHONE (OUTPATIENT)
Dept: ENDOCRINOLOGY | Facility: CLINIC | Age: 79
End: 2024-05-22
Payer: MEDICARE

## 2024-05-22 NOTE — TELEPHONE ENCOUNTER
----- Message from Brooke Walker sent at 5/22/2024  3:58 PM CDT -----  Regarding: clinical notes needed  Contact: doni  Type:  Needs Medical Advice    Who Called: Bruce    Would the patient rather a call back or a response via [facility]? Call back  Best Call Back Number: [phone]  x [phone]  Additional Information: sts she sent over paperwork for a request for clinical notes for the last 6 months for diabetic supplies--please advise

## 2024-06-03 ENCOUNTER — TELEPHONE (OUTPATIENT)
Dept: FAMILY MEDICINE | Facility: CLINIC | Age: 79
End: 2024-06-03
Payer: MEDICARE

## 2024-06-03 NOTE — TELEPHONE ENCOUNTER
----- Message from Darling Hoffman sent at 6/3/2024 11:13 AM CDT -----  Contact: Patient, [phone]  Calling because Rx mycophenolate (CELLCEPT) 500 mg Tab needs to be in 30 day supply. He can not afford the 3 month supply. Thanks.          [address] [place] [address]  Phone: [phone] Fax: [phone]

## 2024-06-05 ENCOUNTER — TELEPHONE (OUTPATIENT)
Dept: ENDOCRINOLOGY | Facility: CLINIC | Age: 79
End: 2024-06-05
Payer: MEDICARE

## 2024-06-05 ENCOUNTER — TELEPHONE (OUTPATIENT)
Dept: FAMILY MEDICINE | Facility: CLINIC | Age: 79
End: 2024-06-05
Payer: MEDICARE

## 2024-06-05 DIAGNOSIS — Z79.4 TYPE 2 DIABETES MELLITUS WITH HYPERGLYCEMIA, WITH LONG-TERM CURRENT USE OF INSULIN: Primary | ICD-10-CM

## 2024-06-05 DIAGNOSIS — E11.65 TYPE 2 DIABETES MELLITUS WITH HYPERGLYCEMIA, WITH LONG-TERM CURRENT USE OF INSULIN: Primary | ICD-10-CM

## 2024-06-05 NOTE — TELEPHONE ENCOUNTER
----- Message from MASOOD Kamara PA-C sent at 6/5/2024  3:14 PM CDT -----  Contact: pt  Send the referral  ----- Message -----  From: Wanda Umana LPN  Sent: 6/5/2024   1:57 PM CDT  To: MASOOD Kamara PA-C    Could I put a referral in for DE since patient is going from Dexcom to [facility] to help him set everything up?  ----- Message -----  From: Pao Morales  Sent: 6/5/2024  11:34 AM CDT  To: Anh Gillis Staff    Type: Needs Medical Advice         Who Called: Pt  Best Call Back Number:[phone]    Additional Information: Requesting a call back regarding  Pt received his glucose monitor over the weekend and having issues and asking for the office to call him please.  Pt needs help with appt on phone and with attaching the monitor  to his arm.      Please Advise- Thank you

## 2024-06-10 ENCOUNTER — TELEPHONE (OUTPATIENT)
Dept: DIABETES | Facility: CLINIC | Age: 79
End: 2024-06-10
Payer: MEDICARE

## 2024-07-29 DIAGNOSIS — I10 HYPERTENSION, ESSENTIAL: ICD-10-CM

## 2024-07-31 RX ORDER — AMLODIPINE BESYLATE 10 MG/1
10 TABLET ORAL
Qty: 90 TABLET | Refills: 0 | Status: SHIPPED | OUTPATIENT
Start: 2024-07-31

## 2024-08-09 ENCOUNTER — LAB VISIT (OUTPATIENT)
Dept: LAB | Facility: HOSPITAL | Age: 79
End: 2024-08-09
Attending: PHYSICIAN ASSISTANT
Payer: MEDICARE

## 2024-08-09 DIAGNOSIS — E11.42 TYPE 2 DIABETES MELLITUS WITH DIABETIC POLYNEUROPATHY, WITH LONG-TERM CURRENT USE OF INSULIN: ICD-10-CM

## 2024-08-09 DIAGNOSIS — Z79.4 TYPE 2 DIABETES MELLITUS WITH DIABETIC POLYNEUROPATHY, WITH LONG-TERM CURRENT USE OF INSULIN: ICD-10-CM

## 2024-08-09 LAB
ALBUMIN SERPL BCP-MCNC: 3.7 G/DL (ref 3.5–5.2)
ANION GAP SERPL CALC-SCNC: 9 MMOL/L (ref 8–16)
BUN SERPL-MCNC: 16 MG/DL (ref 8–23)
CALCIUM SERPL-MCNC: 9.5 MG/DL (ref 8.7–10.5)
CHLORIDE SERPL-SCNC: 102 MMOL/L (ref 95–110)
CO2 SERPL-SCNC: 28 MMOL/L (ref 23–29)
CREAT SERPL-MCNC: 1 MG/DL (ref 0.5–1.4)
EST. GFR  (NO RACE VARIABLE): >60 ML/MIN/1.73 M^2
ESTIMATED AVG GLUCOSE: 200 MG/DL (ref 68–131)
GLUCOSE SERPL-MCNC: 170 MG/DL (ref 70–110)
HBA1C MFR BLD: 8.6 % (ref 4–5.6)
PHOSPHATE SERPL-MCNC: 3.8 MG/DL (ref 2.7–4.5)
POTASSIUM SERPL-SCNC: 4.2 MMOL/L (ref 3.5–5.1)
SODIUM SERPL-SCNC: 139 MMOL/L (ref 136–145)

## 2024-08-09 PROCEDURE — 83036 HEMOGLOBIN GLYCOSYLATED A1C: CPT | Performed by: PHYSICIAN ASSISTANT

## 2024-08-09 PROCEDURE — 36415 COLL VENOUS BLD VENIPUNCTURE: CPT | Mod: PO | Performed by: PHYSICIAN ASSISTANT

## 2024-08-09 PROCEDURE — 80069 RENAL FUNCTION PANEL: CPT | Performed by: PHYSICIAN ASSISTANT

## 2024-08-14 ENCOUNTER — PATIENT OUTREACH (OUTPATIENT)
Dept: EMERGENCY MEDICINE | Facility: OTHER | Age: 79
End: 2024-08-14
Payer: MEDICARE

## 2024-08-14 ENCOUNTER — TELEPHONE (OUTPATIENT)
Dept: ENDOCRINOLOGY | Facility: CLINIC | Age: 79
End: 2024-08-14
Payer: MEDICARE

## 2024-08-28 ENCOUNTER — OFFICE VISIT (OUTPATIENT)
Dept: FAMILY MEDICINE | Facility: CLINIC | Age: 79
End: 2024-08-28
Payer: MEDICARE

## 2024-08-28 VITALS
OXYGEN SATURATION: 97 % | SYSTOLIC BLOOD PRESSURE: 146 MMHG | WEIGHT: 223.69 LBS | BODY MASS INDEX: 32.02 KG/M2 | RESPIRATION RATE: 18 BRPM | HEART RATE: 52 BPM | HEIGHT: 70 IN | DIASTOLIC BLOOD PRESSURE: 70 MMHG | TEMPERATURE: 98 F

## 2024-08-28 DIAGNOSIS — I10 HYPERTENSION, ESSENTIAL: ICD-10-CM

## 2024-08-28 DIAGNOSIS — G70.00 MYASTHENIA GRAVIS: ICD-10-CM

## 2024-08-28 DIAGNOSIS — E78.5 HYPERLIPIDEMIA, UNSPECIFIED HYPERLIPIDEMIA TYPE: ICD-10-CM

## 2024-08-28 PROCEDURE — 3288F FALL RISK ASSESSMENT DOCD: CPT | Mod: CPTII,S$GLB,, | Performed by: NURSE PRACTITIONER

## 2024-08-28 PROCEDURE — 99213 OFFICE O/P EST LOW 20 MIN: CPT | Mod: S$GLB,,, | Performed by: NURSE PRACTITIONER

## 2024-08-28 PROCEDURE — 99999 PR PBB SHADOW E&M-EST. PATIENT-LVL IV: CPT | Mod: PBBFAC,,, | Performed by: NURSE PRACTITIONER

## 2024-08-28 PROCEDURE — 1126F AMNT PAIN NOTED NONE PRSNT: CPT | Mod: CPTII,S$GLB,, | Performed by: NURSE PRACTITIONER

## 2024-08-28 PROCEDURE — 1159F MED LIST DOCD IN RCRD: CPT | Mod: CPTII,S$GLB,, | Performed by: NURSE PRACTITIONER

## 2024-08-28 PROCEDURE — 3078F DIAST BP <80 MM HG: CPT | Mod: CPTII,S$GLB,, | Performed by: NURSE PRACTITIONER

## 2024-08-28 PROCEDURE — 1101F PT FALLS ASSESS-DOCD LE1/YR: CPT | Mod: CPTII,S$GLB,, | Performed by: NURSE PRACTITIONER

## 2024-08-28 PROCEDURE — 1160F RVW MEDS BY RX/DR IN RCRD: CPT | Mod: CPTII,S$GLB,, | Performed by: NURSE PRACTITIONER

## 2024-08-28 PROCEDURE — 3077F SYST BP >= 140 MM HG: CPT | Mod: CPTII,S$GLB,, | Performed by: NURSE PRACTITIONER

## 2024-08-28 RX ORDER — HYDROCHLOROTHIAZIDE 25 MG/1
25 TABLET ORAL DAILY
Qty: 90 TABLET | Refills: 1 | Status: SHIPPED | OUTPATIENT
Start: 2024-08-28 | End: 2025-08-28

## 2024-08-28 RX ORDER — CARVEDILOL 25 MG/1
25 TABLET ORAL 2 TIMES DAILY WITH MEALS
Qty: 180 TABLET | Refills: 1 | Status: SHIPPED | OUTPATIENT
Start: 2024-08-28 | End: 2025-08-28

## 2024-08-28 RX ORDER — SIMVASTATIN 20 MG/1
20 TABLET, FILM COATED ORAL NIGHTLY
Qty: 90 TABLET | Refills: 1 | Status: SHIPPED | OUTPATIENT
Start: 2024-08-28

## 2024-08-28 RX ORDER — AMLODIPINE BESYLATE 10 MG/1
10 TABLET ORAL DAILY
Qty: 90 TABLET | Refills: 1 | Status: SHIPPED | OUTPATIENT
Start: 2024-08-28

## 2024-08-28 RX ORDER — HYDRALAZINE HYDROCHLORIDE 25 MG/1
25 TABLET, FILM COATED ORAL DAILY
Qty: 90 TABLET | Refills: 1 | Status: SHIPPED | OUTPATIENT
Start: 2024-08-28 | End: 2025-08-28

## 2024-08-28 RX ORDER — LISINOPRIL 40 MG/1
40 TABLET ORAL DAILY
Qty: 90 TABLET | Refills: 1 | Status: SHIPPED | OUTPATIENT
Start: 2024-08-28

## 2024-08-28 NOTE — PROGRESS NOTES
SUBJECTIVE:      Patient ID: Fazal Wade is a 78 y.o. male.    Chief Complaint: Follow-up    HPI  Vss; bp is elevated but is coming down  Will keep at same medications  Is on zocor for chol  Is on lisinopril 40 mg daily is on hctz 25 mg daily is on hydralazine 25 mg daily is on coreg 25 mg bid is on norvasc 10 mg daily; sees endo avani as well  Has fasting labs due  Has bradycardia- is asymptomatic    Follow up in 3 months or sooner if needed  Past Surgical History:   Procedure Laterality Date    COLONOSCOPY      EYE SURGERY       Family History   Problem Relation Name Age of Onset    No Known Problems Mother      Diabetes Father      Hypertension Father      Colon cancer Neg Hx      Colon polyps Neg Hx      Crohn's disease Neg Hx      Esophageal cancer Neg Hx      Ulcerative colitis Neg Hx      Stomach cancer Neg Hx        Social History     Socioeconomic History    Marital status: Single   Tobacco Use    Smoking status: Never    Smokeless tobacco: Never   Substance and Sexual Activity    Alcohol use: Never    Drug use: Never    Sexual activity: Not Currently     Social Determinants of Health     Financial Resource Strain: High Risk (8/10/2024)    Overall Financial Resource Strain (CARDIA)     Difficulty of Paying Living Expenses: Very hard   Food Insecurity: Food Insecurity Present (8/10/2024)    Hunger Vital Sign     Worried About Running Out of Food in the Last Year: Often true     Ran Out of Food in the Last Year: Sometimes true   Transportation Needs: No Transportation Needs (5/7/2024)    PRAPARE - Transportation     Lack of Transportation (Medical): No     Lack of Transportation (Non-Medical): No   Physical Activity: Unknown (8/10/2024)    Exercise Vital Sign     Days of Exercise per Week: 1 day   Stress: Stress Concern Present (8/10/2024)    [employer] - Occupational Stress Questionnaire     Feeling of Stress : To some extent   Housing  Stability: Low Risk  (8/10/2024)    Housing Stability Vital Sign     Unable to Pay for Housing in the Last Year: No     Homeless in the Last Year: No     Current Outpatient Medications   Medication Sig Dispense Refill    ascorbic acid, vitamin C, (VITAMIN C) 100 MG tablet Take 100 mg by mouth once daily.      blood sugar diagnostic Strp 1 each by Misc.(Non-Drug; Combo Route) route 3 (three) times daily. 200 each 5    insulin  unit/mL injection Inject 30 Units into the skin 2 (two) times daily before meals. Pt states 60 u sq am  and 40 u sq in pm. And its otc insulin. 10 mL 1    magnesium 30 mg Tab Take by mouth once.      mycophenolate (CELLCEPT) 500 mg Tab Take 3 tablets (1,500 mg total) by mouth 2 (two) times daily. 2.5 tablets twice daily 180 tablet 1    vit A/vit C/vit E/zinc/copper (PRESERVISION AREDS ORAL) Take by mouth.      amLODIPine (NORVASC) 10 MG tablet Take 1 tablet (10 mg total) by mouth once daily. 90 tablet 1    carvediloL (COREG) 25 MG tablet Take 1 tablet (25 mg total) by mouth 2 (two) times daily with meals. 180 tablet 1    hydrALAZINE (APRESOLINE) 25 MG tablet Take 1 tablet (25 mg total) by mouth once daily. 90 tablet 1    hydroCHLOROthiazide (HYDRODIURIL) 25 MG tablet Take 1 tablet (25 mg total) by mouth once daily. 90 tablet 1    lisinopriL (PRINIVIL,ZESTRIL) 40 MG tablet Take 1 tablet (40 mg total) by mouth once daily. 90 tablet 1    simvastatin (ZOCOR) 20 MG tablet Take 1 tablet (20 mg total) by mouth every evening. 90 tablet 1     Current Facility-Administered Medications   Medication Dose Route Frequency Provider Last Rate Last Admin    methylPREDNISolone sodium succinate injection 40 mg  40 mg Intravenous Once PRN Jacques Cali MD         Review of patient's allergies indicates:   Allergen Reactions    Levaquin [levofloxacin]       Past Medical History:   Diagnosis Date    Diabetes mellitus, type 2     Hyperlipidemia     Hypertension      Past Surgical  "History:   Procedure Laterality Date    COLONOSCOPY      EYE SURGERY         Review of Systems   All other systems reviewed and are negative.   OBJECTIVE:      Vitals:    08/28/24 1150   BP: (!) 146/70   BP Location: Left arm   Patient Position: Sitting   BP Method: Medium (Manual)   Pulse: (!) 52   Resp: 18   Temp: 97.8 °F (36.6 °C)   SpO2: 97%   Weight: 101.5 kg (223 lb 10.5 oz)   Height: 5' 10" (1.778 m)     Physical Exam  Vitals and nursing note reviewed.   Constitutional:       Appearance: Normal appearance. He is obese.   HENT:      Head: Normocephalic and atraumatic.   Eyes:      Pupils: Pupils are equal, round, and reactive to light.   Cardiovascular:      Rate and Rhythm: Regular rhythm. Bradycardia present.      Pulses: Normal pulses.      Heart sounds: Normal heart sounds.   Pulmonary:      Effort: Pulmonary effort is normal.      Breath sounds: Normal breath sounds.   Musculoskeletal:      Cervical back: Normal range of motion.   Skin:     General: Skin is warm and dry.   Neurological:      General: No focal deficit present.      Mental Status: He is alert and oriented to person, place, and time. Mental status is at baseline.   Psychiatric:         Mood and Affect: Mood normal.         Behavior: Behavior normal.         Thought Content: Thought content normal.         Judgment: Judgment normal.      Comments: nonsuicidal      Assessment:       1. Hypertension, essential    2. Hyperlipidemia, unspecified hyperlipidemia type    3. Myasthenia gravis        Plan:       Hypertension, essential  -     amLODIPine (NORVASC) 10 MG tablet; Take 1 tablet (10 mg total) by mouth once daily.  Dispense: 90 tablet; Refill: 1  -     carvediloL (COREG) 25 MG tablet; Take 1 tablet (25 mg total) by mouth 2 (two) times daily with meals.  Dispense: 180 tablet; Refill: 1  -     hydrALAZINE (APRESOLINE) 25 MG tablet; Take 1 tablet (25 mg total) by mouth once daily.  Dispense: 90 tablet; Refill: 1  -     hydroCHLOROthiazide " (HYDRODIURIL) 25 MG tablet; Take 1 tablet (25 mg total) by mouth once daily.  Dispense: 90 tablet; Refill: 1  -     lisinopriL (PRINIVIL,ZESTRIL) 40 MG tablet; Take 1 tablet (40 mg total) by mouth once daily.  Dispense: 90 tablet; Refill: 1    Hyperlipidemia, unspecified hyperlipidemia type  -     simvastatin (ZOCOR) 20 MG tablet; Take 1 tablet (20 mg total) by mouth every evening.  Dispense: 90 tablet; Refill: 1    Myasthenia gravis    Take medications as ordered  Get fasting labs done at same time next order for juneau labs are due so only stuck once  Follow up in 3 months or sooner if needed      No follow-ups on file.      8/28/2024 AKIRA Peres, NICOLASP

## 2024-09-03 LAB
LEFT EYE DM RETINOPATHY: NEGATIVE
RIGHT EYE DM RETINOPATHY: NEGATIVE

## 2024-10-02 ENCOUNTER — OFFICE VISIT (OUTPATIENT)
Dept: ENDOCRINOLOGY | Facility: CLINIC | Age: 79
End: 2024-10-02
Payer: MEDICARE

## 2024-10-02 VITALS
BODY MASS INDEX: 31.95 KG/M2 | SYSTOLIC BLOOD PRESSURE: 132 MMHG | OXYGEN SATURATION: 94 % | WEIGHT: 222.69 LBS | HEART RATE: 50 BPM | TEMPERATURE: 98 F | DIASTOLIC BLOOD PRESSURE: 74 MMHG

## 2024-10-02 DIAGNOSIS — Z79.4 TYPE 2 DIABETES MELLITUS WITH HYPERGLYCEMIA, WITH LONG-TERM CURRENT USE OF INSULIN: Primary | ICD-10-CM

## 2024-10-02 DIAGNOSIS — E11.69 COMBINED HYPERLIPIDEMIA ASSOCIATED WITH TYPE 2 DIABETES MELLITUS: ICD-10-CM

## 2024-10-02 DIAGNOSIS — E11.65 TYPE 2 DIABETES MELLITUS WITH HYPERGLYCEMIA, WITH LONG-TERM CURRENT USE OF INSULIN: Primary | ICD-10-CM

## 2024-10-02 DIAGNOSIS — E78.2 COMBINED HYPERLIPIDEMIA ASSOCIATED WITH TYPE 2 DIABETES MELLITUS: ICD-10-CM

## 2024-10-02 DIAGNOSIS — I10 HYPERTENSION, ESSENTIAL: ICD-10-CM

## 2024-10-02 PROCEDURE — 99999 PR PBB SHADOW E&M-EST. PATIENT-LVL IV: CPT | Mod: PBBFAC,,, | Performed by: PHYSICIAN ASSISTANT

## 2024-10-02 NOTE — PATIENT INSTRUCTIONS
Medication Changes  Start Novolin R 12 units before meals     Continue Novolin N 40 units in the morning and 30 units before bed.

## 2024-10-02 NOTE — PROGRESS NOTES
CC: This 79 y.o. male presents for management of T2DM  and chronic conditions pending review including HTN, HLP    HPI: was diagnosed with T2DM in  ~30 years ago.   Has never been hospitalized r/t DM.  Family hx of DM: father  Fhx of thyroid disease: none  Denies missing doses of DM medication.   hypoglycemia at home  monitoring BG at home:        Av  TIR: 25%  Low: 0%    Diet:   3 am BF-fair life chocolate milk w/ strawberries, apple  -grilled cheese  DN-fried fish  No snacks. Avoids sugary beverages.     Exercise: Plays pool six times weekly in [employer]. Yard work.     CURRENT DM MEDS: Novolin N 40 units am and 30 u pm    Previous meds:  Glyburide-GI upset  Metformin- fecal incontinence    Standards of Care:  [facility] exam:  at [facility]  Podiatry exam: PCP  DE: At diagnosis    PMHx, PSHx: reviewed in epic.  Social Hx: no E/T use.    Wt Readings from Last 10 Encounters:   10/02/24 101 kg (222 lb 10.6 oz)   24 101.5 kg (223 lb 10.5 oz)   24 104.3 kg (230 lb)   04/10/24 106.5 kg (234 lb 12.6 oz)   24 103 kg (227 lb 2.9 oz)   24 105.2 kg (231 lb 14.4 oz)   24 105.3 kg (232 lb 0.6 oz)   23 106.1 kg (233 lb 14.5 oz)   23 106.5 kg (234 lb 12.6 oz)   22 108.9 kg (240 lb)      ROS:   Gen: Appetite good, +wt loss 8 lbs, denies fatigue and weakness.  Skin: Skin is intact and heals well, no rashes, no hair changes  Eyes: Denies visual disturbances  Resp: no SOB or MELO, no cough  Cardiac: No palpitations, chest pain, no edema   GI: No nausea or vomiting, diarrhea, constipation, or abdominal pain.  /GYN: No nocturia, burning or pain.   MS/Neuro: Denies numbness/ tingling in BLE; Gait steady, speech clear  Psych: Denies drug/ETOH abuse, no hx of depression.  Other systems: negative.    /74 (BP Location: Right arm)   Pulse (!) 50   Temp 98.3 °F (36.8 °C) (Oral)   Wt 101 kg (222 lb 10.6 oz)   SpO2 (!) 94%   BMI 31.95 kg/m²      PE:  GENERAL: Well  developed, well nourished.  PSYCH: AAOx3, appropriate mood and affect, pleasant expression, conversant, appears relaxed, well groomed.   EYES: Conjunctiva, corneas clear  CHEST: Resp even and unlabored  NEURO: Gait steady  SKIN: Skin warm and dry no acanthosis nigracans.  4/24  Foot Exam: no sores or macerations noted.     Protective Sensation (w/ 10 gram monofilament):  Right: Intact  Left: Intact    Visual Inspection:  Normal -  Bilateral, Nails Intact - without Evidence of Foot Deformity- Bilateral, and Dry Skin -  Bilateral    Pedal Pulses:   Right: Present  Left: Present    Posterior Tibialis Pulses:   Right:Present  Left: Present     Vibratory Sensation  Right:Positive  Left:Positive     Personally reviewed labs below:    No visits with results within 1 Month(s) from this visit.   Latest known visit with results is:   Lab Visit on 08/09/2024   Component Date Value Ref Range Status    Glucose 08/09/2024 170 (H)  70 - 110 mg/dL Final    Sodium 08/09/2024 139  136 - 145 mmol/L Final    Potassium 08/09/2024 4.2  3.5 - 5.1 mmol/L Final    Chloride 08/09/2024 102  95 - 110 mmol/L Final    CO2 08/09/2024 28  23 - 29 mmol/L Final    BUN 08/09/2024 16  8 - 23 mg/dL Final    Calcium 08/09/2024 9.5  8.7 - 10.5 mg/dL Final    Creatinine 08/09/2024 1.0  0.5 - 1.4 mg/dL Final    Albumin 08/09/2024 3.7  3.5 - 5.2 g/dL Final    Phosphorus 08/09/2024 3.8  2.7 - 4.5 mg/dL Final    eGFR 08/09/2024 >60.0  >60 mL/min/1.73 m^2 Final    Anion Gap 08/09/2024 9  8 - 16 mmol/L Final    Hemoglobin A1C 08/09/2024 8.6 (H)  4.0 - 5.6 % Final    Comment: ADA Screening Guidelines:  5.7-6.4%  Consistent with prediabetes  >or=6.5%  Consistent with diabetes    High levels of fetal hemoglobin interfere with the HbA1C  assay. Heterozygous hemoglobin variants (HbS, HgC, etc)do  not significantly interfere with this assay.   However, presence of multiple variants may affect accuracy.      Estimated Avg Glucose 08/09/2024 200 (H)  68 - 131 mg/dL  Final         ASSESSMENT and PLAN:    1. Type 2 diabetes mellitus with hyperglycemia, with long-term current use of insulin  Hemoglobin A1C      2. Hypertension, essential        3. Combined hyperlipidemia associated with type 2 diabetes mellitus          T2DM with hyperglycemia-  A1c is elevated.  He is having pp excursions in the afternoon.    Medication Changes  Start Novolin R 12 units before meals     Continue Novolin N 40 units in the morning and 30 units before bed.    Declines DE. Continue Dexcom G7.   Discussed DM, progression of disease, long term complications, tx options.   Discussed A1c and BG goals.   Reviewed  hypoglycemia, s/s and appropriate tx.   Instructed to monitor BG and bring meter/ log to every clinic visit.   - takes ASA, ACEi, statin    HTN - controlled, continue meds as previously prescribed and monitor.     HLP - LDL , on statin therapy, LFTs WNL     Follow-Up    F/u in 3 mths w/ labs prior-  A1c

## 2024-11-26 ENCOUNTER — LAB VISIT (OUTPATIENT)
Dept: LAB | Facility: HOSPITAL | Age: 79
End: 2024-11-26
Attending: NURSE PRACTITIONER
Payer: MEDICARE

## 2024-11-26 DIAGNOSIS — E11.42 TYPE 2 DIABETES MELLITUS WITH DIABETIC POLYNEUROPATHY, WITH LONG-TERM CURRENT USE OF INSULIN: ICD-10-CM

## 2024-11-26 DIAGNOSIS — I10 HYPERTENSION, ESSENTIAL: ICD-10-CM

## 2024-11-26 DIAGNOSIS — E78.5 HYPERLIPIDEMIA, UNSPECIFIED HYPERLIPIDEMIA TYPE: ICD-10-CM

## 2024-11-26 DIAGNOSIS — Z79.4 TYPE 2 DIABETES MELLITUS WITH DIABETIC POLYNEUROPATHY, WITH LONG-TERM CURRENT USE OF INSULIN: ICD-10-CM

## 2024-11-26 DIAGNOSIS — G70.00 MYASTHENIA GRAVIS: ICD-10-CM

## 2024-11-26 LAB
ALBUMIN SERPL BCP-MCNC: 4 G/DL (ref 3.5–5.2)
ALP SERPL-CCNC: 50 U/L (ref 55–135)
ALT SERPL W/O P-5'-P-CCNC: 6 U/L (ref 10–44)
ANION GAP SERPL CALC-SCNC: 5 MMOL/L (ref 8–16)
AST SERPL-CCNC: 9 U/L (ref 10–40)
BASOPHILS # BLD AUTO: 0.03 K/UL (ref 0–0.2)
BASOPHILS NFR BLD: 0.4 % (ref 0–1.9)
BILIRUB SERPL-MCNC: 0.5 MG/DL (ref 0.1–1)
BUN SERPL-MCNC: 27 MG/DL (ref 8–23)
CALCIUM SERPL-MCNC: 8.9 MG/DL (ref 8.7–10.5)
CHLORIDE SERPL-SCNC: 100 MMOL/L (ref 95–110)
CHOLEST SERPL-MCNC: 115 MG/DL (ref 120–199)
CHOLEST/HDLC SERPL: 3.4 {RATIO} (ref 2–5)
CO2 SERPL-SCNC: 31 MMOL/L (ref 23–29)
CREAT SERPL-MCNC: 0.9 MG/DL (ref 0.5–1.4)
DIFFERENTIAL METHOD BLD: ABNORMAL
EOSINOPHIL # BLD AUTO: 0.2 K/UL (ref 0–0.5)
EOSINOPHIL NFR BLD: 2.7 % (ref 0–8)
ERYTHROCYTE [DISTWIDTH] IN BLOOD BY AUTOMATED COUNT: 12.4 % (ref 11.5–14.5)
EST. GFR  (NO RACE VARIABLE): >60 ML/MIN/1.73 M^2
ESTIMATED AVG GLUCOSE: 209 MG/DL (ref 68–131)
GLUCOSE SERPL-MCNC: 231 MG/DL (ref 70–110)
HBA1C MFR BLD: 8.9 % (ref 4.5–6.2)
HCT VFR BLD AUTO: 43.9 % (ref 40–54)
HDLC SERPL-MCNC: 34 MG/DL (ref 40–75)
HDLC SERPL: 29.6 % (ref 20–50)
HGB BLD-MCNC: 14.8 G/DL (ref 14–18)
IMM GRANULOCYTES # BLD AUTO: 0.02 K/UL (ref 0–0.04)
IMM GRANULOCYTES NFR BLD AUTO: 0.3 % (ref 0–0.5)
LDLC SERPL CALC-MCNC: 63 MG/DL (ref 63–159)
LYMPHOCYTES # BLD AUTO: 2 K/UL (ref 1–4.8)
LYMPHOCYTES NFR BLD: 28.6 % (ref 18–48)
MCH RBC QN AUTO: 31.5 PG (ref 27–31)
MCHC RBC AUTO-ENTMCNC: 33.7 G/DL (ref 32–36)
MCV RBC AUTO: 93 FL (ref 82–98)
MONOCYTES # BLD AUTO: 0.8 K/UL (ref 0.3–1)
MONOCYTES NFR BLD: 11.1 % (ref 4–15)
NEUTROPHILS # BLD AUTO: 4.1 K/UL (ref 1.8–7.7)
NEUTROPHILS NFR BLD: 56.9 % (ref 38–73)
NONHDLC SERPL-MCNC: 81 MG/DL
NRBC BLD-RTO: 0 /100 WBC
PLATELET # BLD AUTO: 186 K/UL (ref 150–450)
PMV BLD AUTO: 9.7 FL (ref 9.2–12.9)
POTASSIUM SERPL-SCNC: 4.5 MMOL/L (ref 3.5–5.1)
PROT SERPL-MCNC: 6.7 G/DL (ref 6–8.4)
RBC # BLD AUTO: 4.7 M/UL (ref 4.6–6.2)
SODIUM SERPL-SCNC: 136 MMOL/L (ref 136–145)
TRIGL SERPL-MCNC: 90 MG/DL (ref 30–150)
WBC # BLD AUTO: 7.11 K/UL (ref 3.9–12.7)

## 2024-11-26 PROCEDURE — 85025 COMPLETE CBC W/AUTO DIFF WBC: CPT | Performed by: NURSE PRACTITIONER

## 2024-11-26 PROCEDURE — 80061 LIPID PANEL: CPT | Performed by: NURSE PRACTITIONER

## 2024-11-26 PROCEDURE — 80053 COMPREHEN METABOLIC PANEL: CPT | Performed by: NURSE PRACTITIONER

## 2024-11-26 PROCEDURE — 83036 HEMOGLOBIN GLYCOSYLATED A1C: CPT | Performed by: NURSE PRACTITIONER

## 2024-12-04 ENCOUNTER — OFFICE VISIT (OUTPATIENT)
Dept: FAMILY MEDICINE | Facility: CLINIC | Age: 79
End: 2024-12-04
Payer: MEDICARE

## 2024-12-04 VITALS
HEART RATE: 54 BPM | WEIGHT: 221 LBS | DIASTOLIC BLOOD PRESSURE: 60 MMHG | HEIGHT: 70 IN | RESPIRATION RATE: 18 BRPM | SYSTOLIC BLOOD PRESSURE: 112 MMHG | OXYGEN SATURATION: 92 % | BODY MASS INDEX: 31.64 KG/M2 | TEMPERATURE: 98 F

## 2024-12-04 DIAGNOSIS — E53.8 B12 DEFICIENCY: ICD-10-CM

## 2024-12-04 DIAGNOSIS — J43.9 PULMONARY EMPHYSEMA, UNSPECIFIED EMPHYSEMA TYPE: ICD-10-CM

## 2024-12-04 DIAGNOSIS — I70.0 AORTIC ATHEROSCLEROSIS: ICD-10-CM

## 2024-12-04 DIAGNOSIS — E78.5 HYPERLIPIDEMIA, UNSPECIFIED HYPERLIPIDEMIA TYPE: ICD-10-CM

## 2024-12-04 DIAGNOSIS — Z79.4 TYPE 2 DIABETES MELLITUS WITH HYPERGLYCEMIA, WITH LONG-TERM CURRENT USE OF INSULIN: Primary | ICD-10-CM

## 2024-12-04 DIAGNOSIS — Z23 PNEUMOCOCCAL VACCINATION ADMINISTERED AT CURRENT VISIT: ICD-10-CM

## 2024-12-04 DIAGNOSIS — Z79.4 TYPE 2 DIABETES MELLITUS WITH DIABETIC POLYNEUROPATHY, WITH LONG-TERM CURRENT USE OF INSULIN: ICD-10-CM

## 2024-12-04 DIAGNOSIS — I10 HYPERTENSION, ESSENTIAL: ICD-10-CM

## 2024-12-04 DIAGNOSIS — N18.31 STAGE 3A CHRONIC KIDNEY DISEASE: ICD-10-CM

## 2024-12-04 DIAGNOSIS — E11.65 TYPE 2 DIABETES MELLITUS WITH HYPERGLYCEMIA, WITH LONG-TERM CURRENT USE OF INSULIN: Primary | ICD-10-CM

## 2024-12-04 DIAGNOSIS — E11.42 TYPE 2 DIABETES MELLITUS WITH DIABETIC POLYNEUROPATHY, WITH LONG-TERM CURRENT USE OF INSULIN: ICD-10-CM

## 2024-12-04 DIAGNOSIS — E66.9 OBESITY (BMI 30-39.9): ICD-10-CM

## 2024-12-04 DIAGNOSIS — Z86.73 HISTORY OF STROKE: ICD-10-CM

## 2024-12-04 DIAGNOSIS — R25.1 TREMOR: ICD-10-CM

## 2024-12-04 DIAGNOSIS — G70.00 MYASTHENIA GRAVIS: ICD-10-CM

## 2024-12-04 DIAGNOSIS — Z23 NEED FOR SHINGLES VACCINE: ICD-10-CM

## 2024-12-04 PROCEDURE — 3288F FALL RISK ASSESSMENT DOCD: CPT | Mod: CPTII,S$GLB,, | Performed by: NURSE PRACTITIONER

## 2024-12-04 PROCEDURE — G0009 ADMIN PNEUMOCOCCAL VACCINE: HCPCS | Mod: 59,S$GLB,, | Performed by: NURSE PRACTITIONER

## 2024-12-04 PROCEDURE — 1126F AMNT PAIN NOTED NONE PRSNT: CPT | Mod: CPTII,S$GLB,, | Performed by: NURSE PRACTITIONER

## 2024-12-04 PROCEDURE — 3074F SYST BP LT 130 MM HG: CPT | Mod: CPTII,S$GLB,, | Performed by: NURSE PRACTITIONER

## 2024-12-04 PROCEDURE — 90471 IMMUNIZATION ADMIN: CPT | Mod: S$GLB,,, | Performed by: NURSE PRACTITIONER

## 2024-12-04 PROCEDURE — 3078F DIAST BP <80 MM HG: CPT | Mod: CPTII,S$GLB,, | Performed by: NURSE PRACTITIONER

## 2024-12-04 PROCEDURE — 90677 PCV20 VACCINE IM: CPT | Mod: S$GLB,,, | Performed by: NURSE PRACTITIONER

## 2024-12-04 PROCEDURE — 99214 OFFICE O/P EST MOD 30 MIN: CPT | Mod: 25,S$GLB,, | Performed by: NURSE PRACTITIONER

## 2024-12-04 PROCEDURE — 1159F MED LIST DOCD IN RCRD: CPT | Mod: CPTII,S$GLB,, | Performed by: NURSE PRACTITIONER

## 2024-12-04 PROCEDURE — 99999 PR PBB SHADOW E&M-EST. PATIENT-LVL III: CPT | Mod: PBBFAC,,, | Performed by: NURSE PRACTITIONER

## 2024-12-04 PROCEDURE — 1160F RVW MEDS BY RX/DR IN RCRD: CPT | Mod: CPTII,S$GLB,, | Performed by: NURSE PRACTITIONER

## 2024-12-04 PROCEDURE — 1101F PT FALLS ASSESS-DOCD LE1/YR: CPT | Mod: CPTII,S$GLB,, | Performed by: NURSE PRACTITIONER

## 2024-12-04 PROCEDURE — 90750 HZV VACC RECOMBINANT IM: CPT | Mod: S$GLB,,, | Performed by: NURSE PRACTITIONER

## 2024-12-04 RX ORDER — SIMVASTATIN 20 MG/1
20 TABLET, FILM COATED ORAL NIGHTLY
Qty: 90 TABLET | Refills: 1 | Status: SHIPPED | OUTPATIENT
Start: 2024-12-04

## 2024-12-04 RX ORDER — AMLODIPINE BESYLATE 10 MG/1
10 TABLET ORAL DAILY
Qty: 90 TABLET | Refills: 1 | Status: SHIPPED | OUTPATIENT
Start: 2024-12-04

## 2024-12-04 RX ORDER — HYDRALAZINE HYDROCHLORIDE 25 MG/1
25 TABLET, FILM COATED ORAL DAILY
Qty: 90 TABLET | Refills: 1 | Status: SHIPPED | OUTPATIENT
Start: 2024-12-04 | End: 2025-12-04

## 2024-12-04 RX ORDER — HYDROCHLOROTHIAZIDE 25 MG/1
25 TABLET ORAL DAILY
Qty: 90 TABLET | Refills: 1 | Status: SHIPPED | OUTPATIENT
Start: 2024-12-04 | End: 2025-12-04

## 2024-12-04 RX ORDER — CARVEDILOL 25 MG/1
25 TABLET ORAL 2 TIMES DAILY WITH MEALS
Qty: 180 TABLET | Refills: 1 | Status: SHIPPED | OUTPATIENT
Start: 2024-12-04 | End: 2025-12-04

## 2024-12-04 RX ORDER — LISINOPRIL 40 MG/1
40 TABLET ORAL DAILY
Qty: 90 TABLET | Refills: 1 | Status: SHIPPED | OUTPATIENT
Start: 2024-12-04

## 2024-12-04 RX ORDER — MYCOPHENOLATE MOFETIL 500 MG/1
1250 TABLET ORAL 2 TIMES DAILY
Qty: 180 TABLET | Refills: 1 | Status: SHIPPED | OUTPATIENT
Start: 2024-12-04

## 2024-12-04 NOTE — PROGRESS NOTES
SUBJECTIVE:      Patient ID: Fazal Wade is a 79 y.o. male.    Chief Complaint: Follow-up (3 month)    HPI  Is here for follow up  Is on coreg for bp  Is on hydralazine for bp  Is on hctz for bp  Is on lisinopril for bp  Is on zocor for lipid  Is on cellcept for s/p transplant  Is on norvasc for bp  Is on insulin nph 30 units bid before meals    Has bradycardia; is asymptomatic  A1c is 8.9 from 8.6- was on shakes doing well; got off of shakes- sugar high; went back to shakes is unable to control sugar is in 150s to 300s upon AM waking  FBG is 231  Cbc and cmp looks good    Is due for t dap  Is due for shingrix  Is due for pneumonia vacc  Is due for rsv vacc  Is due for flu shot  Had eye exam at [facility] in nov 2024    Sees trevor diaze avani jan 16 2025- will keep insulin the same until she decides how to change and if wants to add medications; however will add metformin and also instruct to take otc b 12 supplements        Past Surgical History:   Procedure Laterality Date    COLONOSCOPY      EYE SURGERY       Family History   Problem Relation Name Age of Onset    No Known Problems Mother      Diabetes Father      Hypertension Father      Colon cancer Neg Hx      Colon polyps Neg Hx      Crohn's disease Neg Hx      Esophageal cancer Neg Hx      Ulcerative colitis Neg Hx      Stomach cancer Neg Hx        Social History     Socioeconomic History    Marital status: Single   Tobacco Use    Smoking status: Never    Smokeless tobacco: Never   Substance and Sexual Activity    Alcohol use: Never    Drug use: Never    Sexual activity: Not Currently     Social Drivers of Health     Financial Resource Strain: High Risk (8/10/2024)    Overall Financial Resource Strain (CARDIA)     Difficulty of Paying Living Expenses: Very hard   Food Insecurity: Food Insecurity Present (8/10/2024)    Hunger Vital Sign     Worried About Running Out of Food in the Last Year: Often true     Ran Out of  Food in the Last Year: Sometimes true   Transportation Needs: No Transportation Needs (5/7/2024)    PRAPARE - Transportation     Lack of Transportation (Medical): No     Lack of Transportation (Non-Medical): No   Physical Activity: Unknown (8/10/2024)    Exercise Vital Sign     Days of Exercise per Week: 1 day   Stress: Stress Concern Present (8/10/2024)    [facility] [employer] - Occupational Stress Questionnaire     Feeling of Stress : To some extent   Housing Stability: Low Risk  (8/10/2024)    Housing Stability Vital Sign     Unable to Pay for Housing in the Last Year: No     Homeless in the Last Year: No     Current Outpatient Medications   Medication Sig Dispense Refill    ascorbic acid, vitamin C, (VITAMIN C) 100 MG tablet Take 100 mg by mouth once daily.      blood sugar diagnostic Strp 1 each by Misc.(Non-Drug; Combo Route) route 3 (three) times daily. 200 each 5    insulin  unit/mL injection Inject 30 Units into the skin 2 (two) times daily before meals. Pt states 60 u sq am  and 40 u sq in pm. And its otc insulin. 10 mL 1    magnesium 30 mg Tab Take by mouth once.      vit A/vit C/vit E/zinc/copper (PRESERVISION AREDS ORAL) Take by mouth.      amLODIPine (NORVASC) 10 MG tablet Take 1 tablet (10 mg total) by mouth once daily. 90 tablet 1    carvediloL (COREG) 25 MG tablet Take 1 tablet (25 mg total) by mouth 2 (two) times daily with meals. 180 tablet 1    hydrALAZINE (APRESOLINE) 25 MG tablet Take 1 tablet (25 mg total) by mouth once daily. 90 tablet 1    hydroCHLOROthiazide (HYDRODIURIL) 25 MG tablet Take 1 tablet (25 mg total) by mouth once daily. 90 tablet 1    lisinopriL (PRINIVIL,ZESTRIL) 40 MG tablet Take 1 tablet (40 mg total) by mouth once daily. 90 tablet 1    mycophenolate (CELLCEPT) 500 mg Tab Take 3 tablets (1,500 mg total) by mouth 2 (two) times daily. 2.5 tablets twice daily 180 tablet 1    simvastatin (ZOCOR) 20 MG tablet Take 1 tablet (20 mg  "total) by mouth every evening. 90 tablet 1     Current Facility-Administered Medications   Medication Dose Route Frequency Provider Last Rate Last Admin    methylPREDNISolone sodium succinate injection 40 mg  40 mg Intravenous Once PRN Jacques Cali MD         Review of patient's allergies indicates:   Allergen Reactions    Levaquin [levofloxacin]       Past Medical History:   Diagnosis Date    Diabetes mellitus, type 2     Hyperlipidemia     Hypertension      Past Surgical History:   Procedure Laterality Date    COLONOSCOPY      EYE SURGERY         Review of Systems   All other systems reviewed and are negative.   OBJECTIVE:      Vitals:    12/04/24 0958   BP: 112/60   BP Location: Left arm   Patient Position: Sitting   Pulse: (!) 54   Resp: 18   Temp: 98 °F (36.7 °C)   SpO2: (!) 92%   Weight: 100.3 kg (221 lb 0.2 oz)   Height: 5' 10" (1.778 m)     Physical Exam  Vitals and nursing note reviewed.   Constitutional:       Appearance: He is obese.   HENT:      Head: Normocephalic and atraumatic.   Eyes:      Pupils: Pupils are equal, round, and reactive to light.   Neck:      Comments: No thrills no bruits  No abnormal neck masses felt  Cardiovascular:      Rate and Rhythm: Regular rhythm. Bradycardia present.      Pulses: Normal pulses.      Heart sounds: Normal heart sounds.      Comments: S1 s2 bradycardia  No edema noted on bilateral lower ext  Pulmonary:      Effort: Pulmonary effort is normal.      Breath sounds: Normal breath sounds.   Musculoskeletal:      Cervical back: Normal range of motion.   Skin:     General: Skin is warm and dry.   Neurological:      General: No focal deficit present.      Mental Status: He is alert and oriented to person, place, and time. Mental status is at baseline.   Psychiatric:         Mood and Affect: Mood normal.         Behavior: Behavior normal.         Thought Content: Thought content normal.         Judgment: Judgment normal.      Comments: nonsuicidal    "   Assessment:       1. Type 2 diabetes mellitus with hyperglycemia, with long-term current use of insulin    2. Hypertension, essential    3. Hyperlipidemia, unspecified hyperlipidemia type    4. Myasthenia gravis    5. Type 2 diabetes mellitus with diabetic polyneuropathy, with long-term current use of insulin    6. Obesity (BMI 30-39.9)    7. B12 deficiency    8. Stage 3a chronic kidney disease    9. Aortic atherosclerosis    10. Tremor    11. History of stroke        Plan:       Type 2 diabetes mellitus with hyperglycemia, with long-term current use of insulin  -     CBC Auto Differential; Future; Expected date: 12/04/2024  -     Comprehensive Metabolic Panel; Future; Expected date: 12/18/2024  -     TSH; Future; Expected date: 12/04/2024  -     Hemoglobin A1C; Future; Expected date: 12/04/2024    Hypertension, essential  -     carvediloL (COREG) 25 MG tablet; Take 1 tablet (25 mg total) by mouth 2 (two) times daily with meals.  Dispense: 180 tablet; Refill: 1  -     hydrALAZINE (APRESOLINE) 25 MG tablet; Take 1 tablet (25 mg total) by mouth once daily.  Dispense: 90 tablet; Refill: 1  -     hydroCHLOROthiazide (HYDRODIURIL) 25 MG tablet; Take 1 tablet (25 mg total) by mouth once daily.  Dispense: 90 tablet; Refill: 1  -     lisinopriL (PRINIVIL,ZESTRIL) 40 MG tablet; Take 1 tablet (40 mg total) by mouth once daily.  Dispense: 90 tablet; Refill: 1  -     amLODIPine (NORVASC) 10 MG tablet; Take 1 tablet (10 mg total) by mouth once daily.  Dispense: 90 tablet; Refill: 1  -     Comprehensive Metabolic Panel; Future; Expected date: 12/18/2024    Hyperlipidemia, unspecified hyperlipidemia type  -     simvastatin (ZOCOR) 20 MG tablet; Take 1 tablet (20 mg total) by mouth every evening.  Dispense: 90 tablet; Refill: 1  -     Lipid Panel; Future; Expected date: 12/04/2024    Myasthenia gravis  -     mycophenolate (CELLCEPT) 500 mg Tab; Take 3 tablets (1,500 mg total) by mouth 2 (two) times daily. 2.5 tablets twice daily   Dispense: 180 tablet; Refill: 1  -     CBC Auto Differential; Future; Expected date: 12/04/2024    Type 2 diabetes mellitus with diabetic polyneuropathy, with long-term current use of insulin  -     Hemoglobin A1C; Future; Expected date: 12/04/2024    Obesity (BMI 30-39.9)    B12 deficiency    Stage 3a chronic kidney disease    Aortic atherosclerosis    Tremor    History of stroke    Continue medications  Keep appt with nasir varma  Shingrix and pneumonia vacc either here or pharm  Add metformin 500 mg bid  Add vit b 12 over the counter as well  Follow up in 3 months with fasting labs ordered at [facility]      No follow-ups on file.      12/4/2024 AKIRA Peres, FNP

## 2024-12-06 ENCOUNTER — TELEPHONE (OUTPATIENT)
Dept: FAMILY MEDICINE | Facility: CLINIC | Age: 79
End: 2024-12-06
Payer: MEDICARE

## 2024-12-09 ENCOUNTER — TELEPHONE (OUTPATIENT)
Dept: FAMILY MEDICINE | Facility: CLINIC | Age: 79
End: 2024-12-09
Payer: MEDICARE

## 2024-12-09 RX ORDER — METFORMIN HYDROCHLORIDE 500 MG/1
500 TABLET, EXTENDED RELEASE ORAL 2 TIMES DAILY WITH MEALS
Qty: 180 TABLET | Refills: 1 | Status: SHIPPED | OUTPATIENT
Start: 2024-12-09

## 2024-12-09 RX ORDER — METFORMIN HYDROCHLORIDE 500 MG/1
500 TABLET, EXTENDED RELEASE ORAL 2 TIMES DAILY WITH MEALS
COMMUNITY
End: 2024-12-09 | Stop reason: SDUPTHER

## 2024-12-09 NOTE — TELEPHONE ENCOUNTER
----- Message from Kimberly sent at 12/6/2024  1:05 PM CST -----  Regarding: Needs medication called in  Type: Needs Medical Advice  Who Called:  Pt    Pharmacy name and phone #:    [id number] - [place] - [address]  [address]  [place] [address]  Phone: [phone] Fax: [phone]      Best Call Back Number: [phone]    Additional Information: Pt was seen on wednesday and was told he would get metformin but it was never called in please froylan

## 2024-12-09 NOTE — TELEPHONE ENCOUNTER
----- Message from [place] sent at 12/9/2024  2:56 PM CST -----  Regarding: new order  Contact: patient  Type:  Patient Returning Call    Who Called:patient  Who Left Message for Patient:staff  Does the patient know what this is regarding?:new medication   Metformin  Would the patient rather a call back or a response via [facility]? Please ask NP to order  Best Call Back Number:[phone]  Additional Information: 3rd request for medication/ per patient

## 2024-12-26 ENCOUNTER — TELEPHONE (OUTPATIENT)
Dept: FAMILY MEDICINE | Facility: CLINIC | Age: 79
End: 2024-12-26
Payer: MEDICARE

## 2024-12-26 NOTE — TELEPHONE ENCOUNTER
Pharm called to correct cellcept. It was previously prescribed by EMILI Mancini MD. 2 tabs AM and 3 tabs evening. I called and verified with patient also

## 2025-01-03 ENCOUNTER — TELEPHONE (OUTPATIENT)
Dept: FAMILY MEDICINE | Facility: CLINIC | Age: 80
End: 2025-01-03
Payer: MEDICARE

## 2025-01-03 NOTE — TELEPHONE ENCOUNTER
----- Message from Bola sent at 1/2/2025  1:04 PM CST -----  Contact: meredith  Type: Needs Medical Advice  Who Called:  PT    Best Call Back Number: [phone]   Additional Information: Needs a call from nurse because he said his rx for mycophenolate (CELLCEPT) 500 mg Tab is still messed up.

## 2025-01-03 NOTE — TELEPHONE ENCOUNTER
I called pharm   They stated no PA is required,       patient stated he usually pay 45$ but its costing 71$ per [facility]. Patient stated we have the quantity still at 180. I called [facility] they stated no its 150 tabs which they changed after the patient called them and requested the correct quantity.       Insurance stated he has $420 deductible. Its beginning of year. Called patient and advised and offered to give him insurance number to  discuss the price and  he didn't want it

## 2025-01-16 ENCOUNTER — LAB VISIT (OUTPATIENT)
Dept: LAB | Facility: HOSPITAL | Age: 80
End: 2025-01-16
Attending: PHYSICIAN ASSISTANT
Payer: MEDICARE

## 2025-01-16 DIAGNOSIS — Z79.4 TYPE 2 DIABETES MELLITUS WITH HYPERGLYCEMIA, WITH LONG-TERM CURRENT USE OF INSULIN: ICD-10-CM

## 2025-01-16 DIAGNOSIS — E11.65 TYPE 2 DIABETES MELLITUS WITH HYPERGLYCEMIA, WITH LONG-TERM CURRENT USE OF INSULIN: ICD-10-CM

## 2025-01-16 LAB
ESTIMATED AVG GLUCOSE: 197 MG/DL (ref 68–131)
HBA1C MFR BLD: 8.5 % (ref 4–5.6)

## 2025-01-16 PROCEDURE — 36415 COLL VENOUS BLD VENIPUNCTURE: CPT | Mod: PO | Performed by: PHYSICIAN ASSISTANT

## 2025-01-16 PROCEDURE — 83036 HEMOGLOBIN GLYCOSYLATED A1C: CPT | Performed by: PHYSICIAN ASSISTANT

## 2025-02-27 ENCOUNTER — LAB VISIT (OUTPATIENT)
Dept: LAB | Facility: HOSPITAL | Age: 80
End: 2025-02-27
Attending: FAMILY MEDICINE
Payer: MEDICARE

## 2025-02-27 DIAGNOSIS — Z79.4 TYPE 2 DIABETES MELLITUS WITH DIABETIC POLYNEUROPATHY, WITH LONG-TERM CURRENT USE OF INSULIN: ICD-10-CM

## 2025-02-27 DIAGNOSIS — G70.00 MYASTHENIA GRAVIS: ICD-10-CM

## 2025-02-27 DIAGNOSIS — I10 HYPERTENSION, ESSENTIAL: ICD-10-CM

## 2025-02-27 DIAGNOSIS — E11.42 TYPE 2 DIABETES MELLITUS WITH DIABETIC POLYNEUROPATHY, WITH LONG-TERM CURRENT USE OF INSULIN: ICD-10-CM

## 2025-02-27 DIAGNOSIS — E78.5 HYPERLIPIDEMIA, UNSPECIFIED HYPERLIPIDEMIA TYPE: ICD-10-CM

## 2025-02-27 DIAGNOSIS — Z79.4 TYPE 2 DIABETES MELLITUS WITH HYPERGLYCEMIA, WITH LONG-TERM CURRENT USE OF INSULIN: ICD-10-CM

## 2025-02-27 DIAGNOSIS — E11.65 TYPE 2 DIABETES MELLITUS WITH HYPERGLYCEMIA, WITH LONG-TERM CURRENT USE OF INSULIN: ICD-10-CM

## 2025-02-27 LAB
ALBUMIN SERPL BCP-MCNC: 4.2 G/DL (ref 3.5–5.2)
ALP SERPL-CCNC: 51 U/L (ref 55–135)
ALT SERPL W/O P-5'-P-CCNC: 8 U/L (ref 10–44)
ANION GAP SERPL CALC-SCNC: 7 MMOL/L (ref 8–16)
AST SERPL-CCNC: 10 U/L (ref 10–40)
BASOPHILS # BLD AUTO: 0.03 K/UL (ref 0–0.2)
BASOPHILS NFR BLD: 0.4 % (ref 0–1.9)
BILIRUB SERPL-MCNC: 0.6 MG/DL (ref 0.1–1)
BUN SERPL-MCNC: 20 MG/DL (ref 8–23)
CALCIUM SERPL-MCNC: 9.2 MG/DL (ref 8.7–10.5)
CHLORIDE SERPL-SCNC: 100 MMOL/L (ref 95–110)
CHOLEST SERPL-MCNC: 130 MG/DL (ref 120–199)
CHOLEST/HDLC SERPL: 3.8 {RATIO} (ref 2–5)
CO2 SERPL-SCNC: 32 MMOL/L (ref 23–29)
CREAT SERPL-MCNC: 0.8 MG/DL (ref 0.5–1.4)
DIFFERENTIAL METHOD BLD: NORMAL
EOSINOPHIL # BLD AUTO: 0.3 K/UL (ref 0–0.5)
EOSINOPHIL NFR BLD: 3.2 % (ref 0–8)
ERYTHROCYTE [DISTWIDTH] IN BLOOD BY AUTOMATED COUNT: 12.6 % (ref 11.5–14.5)
EST. GFR  (NO RACE VARIABLE): >60 ML/MIN/1.73 M^2
ESTIMATED AVG GLUCOSE: 209 MG/DL (ref 68–131)
GLUCOSE SERPL-MCNC: 175 MG/DL (ref 70–110)
HBA1C MFR BLD: 8.9 % (ref 4.5–6.2)
HCT VFR BLD AUTO: 44.7 % (ref 40–54)
HDLC SERPL-MCNC: 34 MG/DL (ref 40–75)
HDLC SERPL: 26.2 % (ref 20–50)
HGB BLD-MCNC: 14.5 G/DL (ref 14–18)
IMM GRANULOCYTES # BLD AUTO: 0.02 K/UL (ref 0–0.04)
IMM GRANULOCYTES NFR BLD AUTO: 0.3 % (ref 0–0.5)
LDLC SERPL CALC-MCNC: 75.6 MG/DL (ref 63–159)
LYMPHOCYTES # BLD AUTO: 2.4 K/UL (ref 1–4.8)
LYMPHOCYTES NFR BLD: 30.8 % (ref 18–48)
MCH RBC QN AUTO: 30.2 PG (ref 27–31)
MCHC RBC AUTO-ENTMCNC: 32.4 G/DL (ref 32–36)
MCV RBC AUTO: 93 FL (ref 82–98)
MONOCYTES # BLD AUTO: 0.8 K/UL (ref 0.3–1)
MONOCYTES NFR BLD: 10.6 % (ref 4–15)
NEUTROPHILS # BLD AUTO: 4.3 K/UL (ref 1.8–7.7)
NEUTROPHILS NFR BLD: 54.7 % (ref 38–73)
NONHDLC SERPL-MCNC: 96 MG/DL
NRBC BLD-RTO: 0 /100 WBC
PLATELET # BLD AUTO: 210 K/UL (ref 150–450)
PMV BLD AUTO: 9.4 FL (ref 9.2–12.9)
POTASSIUM SERPL-SCNC: 4.1 MMOL/L (ref 3.5–5.1)
PROT SERPL-MCNC: 6.8 G/DL (ref 6–8.4)
RBC # BLD AUTO: 4.8 M/UL (ref 4.6–6.2)
SODIUM SERPL-SCNC: 139 MMOL/L (ref 136–145)
TRIGL SERPL-MCNC: 102 MG/DL (ref 30–150)
TSH SERPL DL<=0.005 MIU/L-ACNC: 1.26 UIU/ML (ref 0.34–5.6)
WBC # BLD AUTO: 7.83 K/UL (ref 3.9–12.7)

## 2025-02-27 PROCEDURE — 80053 COMPREHEN METABOLIC PANEL: CPT | Performed by: NURSE PRACTITIONER

## 2025-02-27 PROCEDURE — 36415 COLL VENOUS BLD VENIPUNCTURE: CPT | Performed by: NURSE PRACTITIONER

## 2025-02-27 PROCEDURE — 85025 COMPLETE CBC W/AUTO DIFF WBC: CPT | Performed by: NURSE PRACTITIONER

## 2025-02-27 PROCEDURE — 80061 LIPID PANEL: CPT | Performed by: NURSE PRACTITIONER

## 2025-02-27 PROCEDURE — 83036 HEMOGLOBIN GLYCOSYLATED A1C: CPT | Performed by: NURSE PRACTITIONER

## 2025-02-27 PROCEDURE — 84443 ASSAY THYROID STIM HORMONE: CPT | Performed by: NURSE PRACTITIONER

## 2025-03-03 ENCOUNTER — TELEPHONE (OUTPATIENT)
Dept: FAMILY MEDICINE | Facility: CLINIC | Age: 80
End: 2025-03-03
Payer: MEDICARE

## 2025-03-03 ENCOUNTER — PATIENT MESSAGE (OUTPATIENT)
Dept: FAMILY MEDICINE | Facility: CLINIC | Age: 80
End: 2025-03-03
Payer: MEDICARE

## 2025-03-12 ENCOUNTER — RESULTS FOLLOW-UP (OUTPATIENT)
Dept: FAMILY MEDICINE | Facility: CLINIC | Age: 80
End: 2025-03-12

## 2025-03-19 ENCOUNTER — OFFICE VISIT (OUTPATIENT)
Dept: FAMILY MEDICINE | Facility: CLINIC | Age: 80
End: 2025-03-19
Payer: MEDICARE

## 2025-03-19 VITALS
HEIGHT: 70 IN | OXYGEN SATURATION: 92 % | BODY MASS INDEX: 31.78 KG/M2 | RESPIRATION RATE: 18 BRPM | HEART RATE: 59 BPM | TEMPERATURE: 98 F | SYSTOLIC BLOOD PRESSURE: 152 MMHG | WEIGHT: 222 LBS | DIASTOLIC BLOOD PRESSURE: 72 MMHG

## 2025-03-19 DIAGNOSIS — G12.20 MOTOR NEURON DISEASE, UNSPECIFIED: ICD-10-CM

## 2025-03-19 DIAGNOSIS — Z79.4 TYPE 2 DIABETES MELLITUS WITH HYPERGLYCEMIA, WITH LONG-TERM CURRENT USE OF INSULIN: ICD-10-CM

## 2025-03-19 DIAGNOSIS — Z79.82 ASPIRIN LONG-TERM USE: ICD-10-CM

## 2025-03-19 DIAGNOSIS — J43.9 PULMONARY EMPHYSEMA, UNSPECIFIED EMPHYSEMA TYPE: ICD-10-CM

## 2025-03-19 DIAGNOSIS — N18.31 STAGE 3A CHRONIC KIDNEY DISEASE: ICD-10-CM

## 2025-03-19 DIAGNOSIS — E11.65 TYPE 2 DIABETES MELLITUS WITH HYPERGLYCEMIA, WITH LONG-TERM CURRENT USE OF INSULIN: ICD-10-CM

## 2025-03-19 DIAGNOSIS — Z79.4 TYPE 2 DIABETES MELLITUS WITH DIABETIC POLYNEUROPATHY, WITH LONG-TERM CURRENT USE OF INSULIN: ICD-10-CM

## 2025-03-19 DIAGNOSIS — E66.01 MORBID OBESITY: ICD-10-CM

## 2025-03-19 DIAGNOSIS — G70.00 MYASTHENIA GRAVIS: ICD-10-CM

## 2025-03-19 DIAGNOSIS — E11.42 TYPE 2 DIABETES MELLITUS WITH DIABETIC POLYNEUROPATHY, WITH LONG-TERM CURRENT USE OF INSULIN: ICD-10-CM

## 2025-03-19 DIAGNOSIS — E78.5 HYPERLIPIDEMIA, UNSPECIFIED HYPERLIPIDEMIA TYPE: ICD-10-CM

## 2025-03-19 DIAGNOSIS — I10 HYPERTENSION, ESSENTIAL: Primary | ICD-10-CM

## 2025-03-19 PROCEDURE — 3078F DIAST BP <80 MM HG: CPT | Mod: CPTII,S$GLB,, | Performed by: NURSE PRACTITIONER

## 2025-03-19 PROCEDURE — 3288F FALL RISK ASSESSMENT DOCD: CPT | Mod: CPTII,S$GLB,, | Performed by: NURSE PRACTITIONER

## 2025-03-19 PROCEDURE — 1101F PT FALLS ASSESS-DOCD LE1/YR: CPT | Mod: CPTII,S$GLB,, | Performed by: NURSE PRACTITIONER

## 2025-03-19 PROCEDURE — 1159F MED LIST DOCD IN RCRD: CPT | Mod: CPTII,S$GLB,, | Performed by: NURSE PRACTITIONER

## 2025-03-19 PROCEDURE — 3077F SYST BP >= 140 MM HG: CPT | Mod: CPTII,S$GLB,, | Performed by: NURSE PRACTITIONER

## 2025-03-19 PROCEDURE — 1126F AMNT PAIN NOTED NONE PRSNT: CPT | Mod: CPTII,S$GLB,, | Performed by: NURSE PRACTITIONER

## 2025-03-19 PROCEDURE — 99999 PR PBB SHADOW E&M-EST. PATIENT-LVL IV: CPT | Mod: PBBFAC,,, | Performed by: NURSE PRACTITIONER

## 2025-03-19 PROCEDURE — 99214 OFFICE O/P EST MOD 30 MIN: CPT | Mod: S$GLB,,, | Performed by: NURSE PRACTITIONER

## 2025-03-19 RX ORDER — INSULIN GLARGINE 100 [IU]/ML
10 INJECTION, SOLUTION SUBCUTANEOUS NIGHTLY
Qty: 15 ML | Refills: 1 | Status: SHIPPED | OUTPATIENT
Start: 2025-03-19 | End: 2026-03-19

## 2025-03-19 RX ORDER — HYDRALAZINE HYDROCHLORIDE 25 MG/1
25 TABLET, FILM COATED ORAL EVERY 12 HOURS
Qty: 180 TABLET | Refills: 1 | Status: SHIPPED | OUTPATIENT
Start: 2025-03-19 | End: 2026-03-19

## 2025-03-19 NOTE — PROGRESS NOTES
SUBJECTIVE:      Patient ID: Fazal Wade is a 79 y.o. male.    Chief Complaint: Follow-up (3 month)    HPI  Is here for follow up  Denies chest pain  Denies sob  Denies fever  Denies chills  Bp is coming down- is on lisinopril 40 mg is on coreg 25 mg bid is on hctz 25 mg is on norvasc 10 mg on hydralazine 25 mg daily- will increase this one to bid  Has bradycardia- asymptomatic    A1c is 8.9 from 8.5 with a fbg 209  States places pool 3-4 x weeks and does not like it when his sugar crashes  Explained elevated blood sugar can damage eyes and kidneys  Is on currently NPH insulin 60 units AM and 50 units PM  Will add lantus 10 units q evening around 7 PM since goes to bed around midnight and wakes up around 3 AM    Is on cellcepts  Is on zocor      Had diabetic eye exam- [facility] eye clinic 3 to 4 months ago; states is went well with results    Is due for vaccines- will consider for future    HDL 34- eat more fish oil fatty fish krill oil omega 3s    Past Surgical History:   Procedure Laterality Date    COLONOSCOPY      EYE SURGERY       Family History   Problem Relation Name Age of Onset    No Known Problems Mother      Diabetes Father      Hypertension Father      Colon cancer Neg Hx      Colon polyps Neg Hx      Crohn's disease Neg Hx      Esophageal cancer Neg Hx      Ulcerative colitis Neg Hx      Stomach cancer Neg Hx        Social History     Socioeconomic History    Marital status: Single   Tobacco Use    Smoking status: Never    Smokeless tobacco: Never   Substance and Sexual Activity    Alcohol use: Never    Drug use: Never    Sexual activity: Not Currently     Social Drivers of Health     Financial Resource Strain: High Risk (8/10/2024)    Overall Financial Resource Strain (CARDIA)     Difficulty of Paying Living Expenses: Very hard   Food Insecurity: High Risk (9/19/2024)    Received from [facility] SDOH Screening     In the past 2 months, did you or others you  "live with eat smaller meals or skip meals because you didn't have money for food?: Yes   Transportation Needs: No Transportation Needs (5/7/2024)    PRAPARE - Transportation     Lack of Transportation (Medical): No     Lack of Transportation (Non-Medical): No   Physical Activity: Unknown (8/10/2024)    Exercise Vital Sign     Days of Exercise per Week: 1 day   Stress: Stress Concern Present (8/10/2024)    [employer] [place] [employer] - Occupational Stress Questionnaire     Feeling of Stress : To some extent   Housing Stability: Unknown (8/10/2024)    Housing Stability Vital Sign     Unable to Pay for Housing in the Last Year: No     Homeless in the Last Year: No     Current Medications[1]  Review of patient's allergies indicates:   Allergen Reactions    Levaquin [levofloxacin]       Past Medical History:   Diagnosis Date    Diabetes mellitus, type 2     Hyperlipidemia     Hypertension      Past Surgical History:   Procedure Laterality Date    COLONOSCOPY      EYE SURGERY         Review of Systems   OBJECTIVE:      Vitals:    03/19/25 1000   BP: (!) 152/72   BP Location: Left arm   Patient Position: Sitting   Pulse: (!) 59   Resp: 18   Temp: 98 °F (36.7 °C)   SpO2: (!) 92%   Weight: 100.7 kg (222 lb)   Height: 5' 10" (1.778 m)     Physical Exam  Vitals and nursing note reviewed.   Constitutional:       Appearance: Normal appearance. He is obese.   HENT:      Head: Normocephalic and atraumatic.   Eyes:      Pupils: Pupils are equal, round, and reactive to light.   Cardiovascular:      Rate and Rhythm: Regular rhythm. Bradycardia present.      Pulses: Normal pulses.      Heart sounds: Normal heart sounds.   Musculoskeletal:      Cervical back: Normal range of motion.   Skin:     General: Skin is warm and dry.   Neurological:      General: No focal deficit present.      Mental Status: He is alert and oriented to person, place, and time. Mental status is at baseline.   Psychiatric:         Mood " and Affect: Mood normal.         Behavior: Behavior normal.         Thought Content: Thought content normal.         Judgment: Judgment normal.      Comments: nonsuicidal      Assessment:       1. Hypertension, essential    2. Hyperlipidemia, unspecified hyperlipidemia type    3. Type 2 diabetes mellitus with hyperglycemia, with long-term current use of insulin    4. Type 2 diabetes mellitus with diabetic polyneuropathy, with long-term current use of insulin    5. Aspirin long-term use        Plan:       Hypertension, essential  -     hydrALAZINE (APRESOLINE) 25 MG tablet; Take 1 tablet (25 mg total) by mouth every 12 (twelve) hours.  Dispense: 180 tablet; Refill: 1  -     Comprehensive Metabolic Panel; Future; Expected date: 03/19/2025  -     TSH; Future; Expected date: 03/19/2025    Hyperlipidemia, unspecified hyperlipidemia type  -     Lipid Panel; Future; Expected date: 03/19/2025    Type 2 diabetes mellitus with hyperglycemia, with long-term current use of insulin  -     Hemoglobin A1C; Future; Expected date: 03/19/2025  -     insulin glargine U-100, Lantus, (LANTUS SOLOSTAR U-100 INSULIN) 100 unit/mL (3 mL) InPn pen; Inject 10 Units into the skin every evening.  Dispense: 15 mL; Refill: 1    Type 2 diabetes mellitus with diabetic polyneuropathy, with long-term current use of insulin  -     Microalbumin/Creatinine Ratio, Urine  -     insulin glargine U-100, Lantus, (LANTUS SOLOSTAR U-100 INSULIN) 100 unit/mL (3 mL) InPn pen; Inject 10 Units into the skin every evening.  Dispense: 15 mL; Refill: 1  -     insulin  unit/mL injection; Inject 30 Units into the skin 2 (two) times daily before meals. Pt states 60 u sq am  and 40 u sq in pm. And its otc insulin.  Dispense: 10 mL; Refill: 1    Aspirin long-term use  -     CBC Auto Differential; Future; Expected date: 04/02/2025    Consider vaccines  Continue medications   Add hydralazine to bid  Add lantus 10 units in evening  Get fasting blood work in 2  months  Follow up in 2 months or sooner if needed    No follow-ups on file.      3/19/2025 AKIRA Peres, FNP             [1]  Current Outpatient Medications   Medication Sig Dispense Refill    amLODIPine (NORVASC) 10 MG tablet Take 1 tablet (10 mg total) by mouth once daily. 90 tablet 1    ascorbic acid, vitamin C, (VITAMIN C) 100 MG tablet Take 100 mg by mouth once daily.      blood sugar diagnostic Strp 1 each by Misc.(Non-Drug; Combo Route) route 3 (three) times daily. 200 each 5    carvediloL (COREG) 25 MG tablet Take 1 tablet (25 mg total) by mouth 2 (two) times daily with meals. 180 tablet 1    hydroCHLOROthiazide (HYDRODIURIL) 25 MG tablet Take 1 tablet (25 mg total) by mouth once daily. 90 tablet 1    lisinopriL (PRINIVIL,ZESTRIL) 40 MG tablet Take 1 tablet (40 mg total) by mouth once daily. 90 tablet 1    magnesium 30 mg Tab Take by mouth once.      mycophenolate (CELLCEPT) 500 mg Tab Take 3 tablets (1,500 mg total) by mouth 2 (two) times daily. 2.5 tablets twice daily 180 tablet 1    simvastatin (ZOCOR) 20 MG tablet Take 1 tablet (20 mg total) by mouth every evening. 90 tablet 1    vit A/vit C/vit E/zinc/copper (PRESERVISION AREDS ORAL) Take by mouth.      hydrALAZINE (APRESOLINE) 25 MG tablet Take 1 tablet (25 mg total) by mouth every 12 (twelve) hours. 180 tablet 1    insulin glargine U-100, Lantus, (LANTUS SOLOSTAR U-100 INSULIN) 100 unit/mL (3 mL) InPn pen Inject 10 Units into the skin every evening. 15 mL 1    insulin  unit/mL injection Inject 30 Units into the skin 2 (two) times daily before meals. Pt states 60 u sq am  and 40 u sq in pm. And its otc insulin. 10 mL 1     Current Facility-Administered Medications   Medication Dose Route Frequency Provider Last Rate Last Admin    methylPREDNISolone sodium succinate injection 40 mg  40 mg Intravenous Once PRN Jacques Cali MD

## 2025-03-20 ENCOUNTER — PATIENT OUTREACH (OUTPATIENT)
Dept: ADMINISTRATIVE | Facility: HOSPITAL | Age: 80
End: 2025-03-20
Payer: MEDICARE

## 2025-03-20 ENCOUNTER — TELEPHONE (OUTPATIENT)
Dept: FAMILY MEDICINE | Facility: CLINIC | Age: 80
End: 2025-03-20
Payer: MEDICARE

## 2025-03-20 NOTE — TELEPHONE ENCOUNTER
Ret call to [facility] , his new is lantus is 10 u sq evening. The nph is 60 u sq in am and 50 u sq pm.

## 2025-03-24 DIAGNOSIS — Z00.00 ENCOUNTER FOR MEDICARE ANNUAL WELLNESS EXAM: ICD-10-CM

## 2025-04-17 ENCOUNTER — PATIENT OUTREACH (OUTPATIENT)
Dept: ADMINISTRATIVE | Facility: HOSPITAL | Age: 80
End: 2025-04-17
Payer: MEDICARE

## 2025-04-17 NOTE — LETTER
AUTHORIZATION FOR RELEASE OF   CONFIDENTIAL INFORMATION    [facility]    We are seeing Fazal Wade, date of birth 1945, in the clinic at [facility]. Sylvester Lugo III, MD is the patient's PCP. Fazal Wade has an outstanding lab/procedure at the time we reviewed his chart. In order to help keep his health information updated, he has authorized us to request the following medical record(s):          EYE EXAM        2ND REQUEST    Please fax records to [phone] or email to [email or web address].    Thank you So much!               If you have any questions, please contact Mayda Sawant, Care Coordinator   at [phone].                Patient Name: Fazal Wade  : 1945  Patient Phone #: [phone]

## 2025-04-17 NOTE — PROGRESS NOTES
Population Health Chart Review & Patient Outreach Details      Additional Pop Health Notes:      2nd request patient assist.  [facility] exam         Updates Requested / Reviewed:      Care Everywhere and          Health Maintenance Topics Overdue:      [facility] Score: 3     Urine Screening  Eye Exam  Uncontrolled BP    Influenza Vaccine  Tetanus Vaccine  Shingles/Zoster Vaccine  RSV Vaccine                  Health Maintenance Topic(s) Outreach Outcomes & Actions Taken:    Eye Exam - Outreach Outcomes & Actions Taken  : External Records Requested & Care Team Updated if Applicable

## 2025-05-21 ENCOUNTER — OFFICE VISIT (OUTPATIENT)
Dept: FAMILY MEDICINE | Facility: CLINIC | Age: 80
End: 2025-05-21
Payer: MEDICARE

## 2025-05-21 ENCOUNTER — TELEPHONE (OUTPATIENT)
Dept: FAMILY MEDICINE | Facility: CLINIC | Age: 80
End: 2025-05-21

## 2025-05-21 VITALS
DIASTOLIC BLOOD PRESSURE: 80 MMHG | SYSTOLIC BLOOD PRESSURE: 160 MMHG | HEART RATE: 63 BPM | BODY MASS INDEX: 32.17 KG/M2 | WEIGHT: 224.75 LBS | HEIGHT: 70 IN | OXYGEN SATURATION: 96 % | TEMPERATURE: 97 F

## 2025-05-21 DIAGNOSIS — E11.42 TYPE 2 DIABETES MELLITUS WITH DIABETIC POLYNEUROPATHY, WITH LONG-TERM CURRENT USE OF INSULIN: Primary | ICD-10-CM

## 2025-05-21 DIAGNOSIS — E78.5 HYPERLIPIDEMIA, UNSPECIFIED HYPERLIPIDEMIA TYPE: ICD-10-CM

## 2025-05-21 DIAGNOSIS — Z79.4 TYPE 2 DIABETES MELLITUS WITH DIABETIC POLYNEUROPATHY, WITH LONG-TERM CURRENT USE OF INSULIN: Primary | ICD-10-CM

## 2025-05-21 DIAGNOSIS — G70.00 MYASTHENIA GRAVIS: ICD-10-CM

## 2025-05-21 DIAGNOSIS — I10 HYPERTENSION, ESSENTIAL: ICD-10-CM

## 2025-05-21 DIAGNOSIS — E11.65 TYPE 2 DIABETES MELLITUS WITH HYPERGLYCEMIA, WITH LONG-TERM CURRENT USE OF INSULIN: ICD-10-CM

## 2025-05-21 DIAGNOSIS — Z79.4 TYPE 2 DIABETES MELLITUS WITH HYPERGLYCEMIA, WITH LONG-TERM CURRENT USE OF INSULIN: ICD-10-CM

## 2025-05-21 PROCEDURE — 1160F RVW MEDS BY RX/DR IN RCRD: CPT | Mod: CPTII,S$GLB,, | Performed by: NURSE PRACTITIONER

## 2025-05-21 PROCEDURE — 3079F DIAST BP 80-89 MM HG: CPT | Mod: CPTII,S$GLB,, | Performed by: NURSE PRACTITIONER

## 2025-05-21 PROCEDURE — 1159F MED LIST DOCD IN RCRD: CPT | Mod: CPTII,S$GLB,, | Performed by: NURSE PRACTITIONER

## 2025-05-21 PROCEDURE — 1101F PT FALLS ASSESS-DOCD LE1/YR: CPT | Mod: CPTII,S$GLB,, | Performed by: NURSE PRACTITIONER

## 2025-05-21 PROCEDURE — 3077F SYST BP >= 140 MM HG: CPT | Mod: CPTII,S$GLB,, | Performed by: NURSE PRACTITIONER

## 2025-05-21 PROCEDURE — 99999 PR PBB SHADOW E&M-EST. PATIENT-LVL IV: CPT | Mod: PBBFAC,,, | Performed by: NURSE PRACTITIONER

## 2025-05-21 PROCEDURE — 99214 OFFICE O/P EST MOD 30 MIN: CPT | Mod: S$GLB,,, | Performed by: NURSE PRACTITIONER

## 2025-05-21 PROCEDURE — 1126F AMNT PAIN NOTED NONE PRSNT: CPT | Mod: CPTII,S$GLB,, | Performed by: NURSE PRACTITIONER

## 2025-05-21 PROCEDURE — 3288F FALL RISK ASSESSMENT DOCD: CPT | Mod: CPTII,S$GLB,, | Performed by: NURSE PRACTITIONER

## 2025-05-21 RX ORDER — HYDRALAZINE HYDROCHLORIDE 25 MG/1
25 TABLET, FILM COATED ORAL EVERY 12 HOURS
Qty: 180 TABLET | Refills: 1 | Status: SHIPPED | OUTPATIENT
Start: 2025-05-21 | End: 2026-05-21

## 2025-05-21 RX ORDER — CARVEDILOL 25 MG/1
25 TABLET ORAL 2 TIMES DAILY WITH MEALS
Qty: 180 TABLET | Refills: 1 | Status: SHIPPED | OUTPATIENT
Start: 2025-05-21 | End: 2026-05-21

## 2025-05-21 RX ORDER — HYDROCHLOROTHIAZIDE 25 MG/1
25 TABLET ORAL DAILY
Qty: 90 TABLET | Refills: 1 | Status: SHIPPED | OUTPATIENT
Start: 2025-05-21 | End: 2026-05-21

## 2025-05-21 RX ORDER — AMLODIPINE BESYLATE 10 MG/1
10 TABLET ORAL DAILY
Qty: 90 TABLET | Refills: 1 | Status: SHIPPED | OUTPATIENT
Start: 2025-05-21

## 2025-05-21 RX ORDER — LISINOPRIL 40 MG/1
40 TABLET ORAL DAILY
Qty: 90 TABLET | Refills: 1 | Status: SHIPPED | OUTPATIENT
Start: 2025-05-21

## 2025-05-21 RX ORDER — MYCOPHENOLATE MOFETIL 500 MG/1
1250 TABLET ORAL 2 TIMES DAILY
Qty: 180 TABLET | Refills: 1 | Status: SHIPPED | OUTPATIENT
Start: 2025-05-21

## 2025-05-21 RX ORDER — SIMVASTATIN 20 MG/1
20 TABLET, FILM COATED ORAL NIGHTLY
Qty: 90 TABLET | Refills: 1 | Status: SHIPPED | OUTPATIENT
Start: 2025-05-21

## 2025-05-21 NOTE — PROGRESS NOTES
SUBJECTIVE:      Patient ID: Fazal Wade is a 79 y.o. male.    Chief Complaint: Follow-up (2 m)    HPI  Is here for follow up  Denies chest pain  Denies sob  Denies fever  Denies chills  Vss; bp is well controlled    Is on zocor  Is on cellcept  Is on lisinopril  Is on insulin NPH is down to 5 units am and 4 units pm- is getting over the counter  Is on lantus insulin stopped- a month ago  Blood sugar 150-200 at home- nonfasting; fasting sugar- 100-130  Is on hctz- is taking 50  Is on hydralazine  Want to take hctz one per day and hydralazine 25 mg bid  Is on coreg  Is on norvasc    Has blood work fasting ordered since feb  Needs urine dm2 and eye exam dm2 (uses [facility]- one dr there was told eyes are in great shape- got done kavitha 6 mnths ago)  At [facility]    Saw nasir varma endocrine in dec during the tornados  Had another appt when it snowed- the building was closed  Have not seen since- will place another consult for dm2 management    Is due for t dap rsv and shinges vaccines- does not want any at this time          Past Surgical History:   Procedure Laterality Date    COLONOSCOPY      EYE SURGERY       Family History   Problem Relation Name Age of Onset    No Known Problems Mother      Diabetes Father      Hypertension Father      Colon cancer Neg Hx      Colon polyps Neg Hx      Crohn's disease Neg Hx      Esophageal cancer Neg Hx      Ulcerative colitis Neg Hx      Stomach cancer Neg Hx        Social History     Socioeconomic History    Marital status: Single   Tobacco Use    Smoking status: Never    Smokeless tobacco: Never   Substance and Sexual Activity    Alcohol use: Never    Drug use: Never    Sexual activity: Not Currently     Social Drivers of Health     Financial Resource Strain: High Risk (8/10/2024)    Overall Financial Resource Strain (CARDIA)     Difficulty of Paying Living Expenses: Very hard   Food Insecurity: High Risk (9/19/2024)    Received from [email or web address]     "[facility] SDOH Screening     In the past 2 months, did you or others you live with eat smaller meals or skip meals because you didn't have money for food?: Yes   Transportation Needs: No Transportation Needs (5/7/2024)    PRAPARE - Transportation     Lack of Transportation (Medical): No     Lack of Transportation (Non-Medical): No   Physical Activity: Unknown (8/10/2024)    Exercise Vital Sign     Days of Exercise per Week: 1 day   Stress: Stress Concern Present (8/10/2024)    [employer] [place] [employer] - Occupational Stress Questionnaire     Feeling of Stress : To some extent   Housing Stability: Unknown (8/10/2024)    Housing Stability Vital Sign     Unable to Pay for Housing in the Last Year: No     Current Medications[1]  Review of patient's allergies indicates:   Allergen Reactions    Levaquin [levofloxacin]       Past Medical History:   Diagnosis Date    Diabetes mellitus, type 2     Hyperlipidemia     Hypertension      Past Surgical History:   Procedure Laterality Date    COLONOSCOPY      EYE SURGERY         Review of Systems   All other systems reviewed and are negative.   OBJECTIVE:      Vitals:    05/21/25 1112   Pulse: 63   Temp: 97.4 °F (36.3 °C)   SpO2: 96%   Weight: 101.9 kg (224 lb 12.1 oz)   Height: 5' 10" (1.778 m)     Physical Exam  Vitals and nursing note reviewed.   Constitutional:       Appearance: Normal appearance. He is obese.   HENT:      Head: Normocephalic and atraumatic.   Neck:      Comments: No thrills no bruits  No abnormal neck masses felt  Cardiovascular:      Rate and Rhythm: Normal rate and regular rhythm.      Pulses: Normal pulses.      Heart sounds: Normal heart sounds.      Comments: S1 s2 nsr  No edema noted on bilateral lower ext  Pulmonary:      Effort: Pulmonary effort is normal.      Breath sounds: Normal breath sounds.   Musculoskeletal:      Cervical back: Normal range of motion.   Skin:     General: Skin is warm and dry.   Neurological:      General: " No focal deficit present.      Mental Status: He is alert and oriented to person, place, and time. Mental status is at baseline.   Psychiatric:         Mood and Affect: Mood normal.         Behavior: Behavior normal.         Thought Content: Thought content normal.         Judgment: Judgment normal.      Comments: nonsuicidal      Assessment:       1. Type 2 diabetes mellitus with diabetic polyneuropathy, with long-term current use of insulin    2. Hypertension, essential    3. Hyperlipidemia, unspecified hyperlipidemia type    4. Myasthenia gravis    5. Type 2 diabetes mellitus with hyperglycemia, with long-term current use of insulin        Plan:       Type 2 diabetes mellitus with diabetic polyneuropathy, with long-term current use of insulin  -     Microalbumin/Creatinine Ratio, Urine  -     Ambulatory referral/consult to Endocrinology; Future; Expected date: 05/21/2025    Hypertension, essential  -     hydrALAZINE (APRESOLINE) 25 MG tablet; Take 1 tablet (25 mg total) by mouth every 12 (twelve) hours.  Dispense: 180 tablet; Refill: 1  -     hydroCHLOROthiazide (HYDRODIURIL) 25 MG tablet; Take 1 tablet (25 mg total) by mouth once daily.  Dispense: 90 tablet; Refill: 1  -     lisinopriL (PRINIVIL,ZESTRIL) 40 MG tablet; Take 1 tablet (40 mg total) by mouth once daily.  Dispense: 90 tablet; Refill: 1  -     carvediloL (COREG) 25 MG tablet; Take 1 tablet (25 mg total) by mouth 2 (two) times daily with meals.  Dispense: 180 tablet; Refill: 1  -     amLODIPine (NORVASC) 10 MG tablet; Take 1 tablet (10 mg total) by mouth once daily.  Dispense: 90 tablet; Refill: 1    Hyperlipidemia, unspecified hyperlipidemia type  -     simvastatin (ZOCOR) 20 MG tablet; Take 1 tablet (20 mg total) by mouth every evening.  Dispense: 90 tablet; Refill: 1    Myasthenia gravis  -     mycophenolate (CELLCEPT) 500 mg Tab; Take 3 tablets (1,500 mg total) by mouth 2 (two) times daily. 2.5 tablets twice daily  Dispense: 180 tablet; Refill:  1    Type 2 diabetes mellitus with hyperglycemia, with long-term current use of insulin  -     Microalbumin/Creatinine Ratio, Urine  -     Ambulatory referral/consult to Endocrinology; Future; Expected date: 05/21/2025      Hctz one per day  Hyrdralazine bid  Continue medications  Hold insulin until get A1c- since already self held for some time  See avani as well  Follow up in 3 weeks for bp check  Follow up in 3 months or sooner if needed  Get fasting blood work and urine for dm2 as well    Follow up in about 3 months (around 8/21/2025), or if symptoms worsen or fail to improve.      5/21/2025 AKIRA Peres, FNP             [1]  Current Outpatient Medications   Medication Sig Dispense Refill    ascorbic acid, vitamin C, (VITAMIN C) 100 MG tablet Take 100 mg by mouth once daily.      blood sugar diagnostic Strp 1 each by Misc.(Non-Drug; Combo Route) route 3 (three) times daily. 200 each 5    magnesium 30 mg Tab Take by mouth once.      vit A/vit C/vit E/zinc/copper (PRESERVISION AREDS ORAL) Take by mouth.      amLODIPine (NORVASC) 10 MG tablet Take 1 tablet (10 mg total) by mouth once daily. 90 tablet 1    carvediloL (COREG) 25 MG tablet Take 1 tablet (25 mg total) by mouth 2 (two) times daily with meals. 180 tablet 1    hydrALAZINE (APRESOLINE) 25 MG tablet Take 1 tablet (25 mg total) by mouth every 12 (twelve) hours. 180 tablet 1    hydroCHLOROthiazide (HYDRODIURIL) 25 MG tablet Take 1 tablet (25 mg total) by mouth once daily. 90 tablet 1    insulin glargine U-100, Lantus, (LANTUS SOLOSTAR U-100 INSULIN) 100 unit/mL (3 mL) InPn pen Inject 10 Units into the skin every evening. (Patient not taking: Reported on 5/21/2025) 15 mL 1    insulin  unit/mL injection Inject 30 Units into the skin 2 (two) times daily before meals. Pt states 60 u sq am  and 40 u sq in pm. And its otc insulin. (Patient not taking: Reported on 5/21/2025) 10 mL 1    lisinopriL (PRINIVIL,ZESTRIL) 40 MG tablet Take 1  tablet (40 mg total) by mouth once daily. 90 tablet 1    mycophenolate (CELLCEPT) 500 mg Tab Take 3 tablets (1,500 mg total) by mouth 2 (two) times daily. 2.5 tablets twice daily 180 tablet 1    simvastatin (ZOCOR) 20 MG tablet Take 1 tablet (20 mg total) by mouth every evening. 90 tablet 1     Current Facility-Administered Medications   Medication Dose Route Frequency Provider Last Rate Last Admin    methylPREDNISolone sodium succinate injection 40 mg  40 mg Intravenous Once PRN Jacques Cali MD

## 2025-05-22 ENCOUNTER — PATIENT OUTREACH (OUTPATIENT)
Dept: ADMINISTRATIVE | Facility: HOSPITAL | Age: 80
End: 2025-05-22
Payer: MEDICARE

## 2025-05-26 ENCOUNTER — TELEPHONE (OUTPATIENT)
Dept: FAMILY MEDICINE | Facility: CLINIC | Age: 80
End: 2025-05-26
Payer: MEDICARE

## 2025-05-26 NOTE — TELEPHONE ENCOUNTER
----- Message from [employer] Marcos sent at 5/23/2025  2:20 PM CDT -----  Type: Needs Medical AdviceWho Called:  paris Best Call Back Number: [phone] Information: paris  from [facility] pharmacy clarify directions for  medication : mycophenolate (CELLCEPT) 500 mg Tab ,  please call paris back to further assist thank you .

## 2025-05-28 ENCOUNTER — TELEPHONE (OUTPATIENT)
Dept: ENDOCRINOLOGY | Facility: CLINIC | Age: 80
End: 2025-05-28
Payer: MEDICARE

## 2025-05-28 NOTE — TELEPHONE ENCOUNTER
----- Message from [employer] sent at 5/27/2025  2:18 PM CDT -----  Name of Caller: AINSLEY GUERRA [[id number]]  When is the first available appointment? N/a  Symptoms: diabetes please call back to schedule appt  Best Call Back Number Telephone Information:Mobile          [phone]  Additional Information: Pt states Dr gillis referred pt to Dr Puja Kamara referral in [id number],Z79.4 (ICD-10-CM) - Type 2 diabetes mellitus with hyperglycemia, with long-term current use of insulin

## 2025-06-06 ENCOUNTER — LAB VISIT (OUTPATIENT)
Dept: LAB | Facility: HOSPITAL | Age: 80
End: 2025-06-06
Attending: NURSE PRACTITIONER
Payer: MEDICARE

## 2025-06-06 DIAGNOSIS — I10 HYPERTENSION, ESSENTIAL: ICD-10-CM

## 2025-06-06 DIAGNOSIS — Z79.4 TYPE 2 DIABETES MELLITUS WITH HYPERGLYCEMIA, WITH LONG-TERM CURRENT USE OF INSULIN: ICD-10-CM

## 2025-06-06 DIAGNOSIS — Z79.82 ASPIRIN LONG-TERM USE: ICD-10-CM

## 2025-06-06 DIAGNOSIS — E11.65 TYPE 2 DIABETES MELLITUS WITH HYPERGLYCEMIA, WITH LONG-TERM CURRENT USE OF INSULIN: ICD-10-CM

## 2025-06-06 DIAGNOSIS — E78.5 HYPERLIPIDEMIA, UNSPECIFIED HYPERLIPIDEMIA TYPE: ICD-10-CM

## 2025-06-06 LAB
ABSOLUTE EOSINOPHIL (SMH): 0.16 K/UL
ABSOLUTE MONOCYTE (SMH): 0.9 K/UL (ref 0.3–1)
ABSOLUTE NEUTROPHIL COUNT (SMH): 4.8 K/UL (ref 1.8–7.7)
ALBUMIN SERPL-MCNC: 4 G/DL (ref 3.5–5.2)
ALBUMIN/CREAT UR: 7.4 UG/MG
ALP SERPL-CCNC: 49 UNIT/L (ref 55–135)
ALT SERPL-CCNC: 6 UNIT/L (ref 10–44)
ANION GAP (SMH): 9 MMOL/L (ref 8–16)
AST SERPL-CCNC: 9 UNIT/L (ref 10–40)
BASOPHILS # BLD AUTO: 0.04 K/UL
BASOPHILS NFR BLD AUTO: 0.5 %
BILIRUB SERPL-MCNC: 0.3 MG/DL (ref 0.1–1)
BUN SERPL-MCNC: 20 MG/DL (ref 8–23)
CALCIUM SERPL-MCNC: 8.9 MG/DL (ref 8.7–10.5)
CHLORIDE SERPL-SCNC: 101 MMOL/L (ref 95–110)
CHOLEST SERPL-MCNC: 122 MG/DL (ref 120–199)
CHOLEST/HDLC SERPL: 3.9 {RATIO} (ref 2–5)
CO2 SERPL-SCNC: 27 MMOL/L (ref 23–29)
CREAT SERPL-MCNC: 0.9 MG/DL (ref 0.5–1.4)
CREAT UR-MCNC: 94.3 MG/DL (ref 23–375)
EAG (SMH): 192 MG/DL (ref 68–131)
ERYTHROCYTE [DISTWIDTH] IN BLOOD BY AUTOMATED COUNT: 12.3 % (ref 11.5–14.5)
GFR SERPLBLD CREATININE-BSD FMLA CKD-EPI: >60 ML/MIN/1.73/M2
GLUCOSE SERPL-MCNC: 211 MG/DL (ref 70–110)
HBA1C MFR BLD: 8.3 % (ref 4.5–6.2)
HCT VFR BLD AUTO: 41.8 % (ref 40–54)
HDLC SERPL-MCNC: 31 MG/DL (ref 40–75)
HDLC SERPL: 25.4 % (ref 20–50)
HGB BLD-MCNC: 13.9 GM/DL (ref 14–18)
IMM GRANULOCYTES # BLD AUTO: 0.03 K/UL (ref 0–0.04)
IMM GRANULOCYTES NFR BLD AUTO: 0.4 % (ref 0–0.5)
LDLC SERPL CALC-MCNC: 71.6 MG/DL (ref 63–159)
LYMPHOCYTES # BLD AUTO: 2.34 K/UL (ref 1–4.8)
MCH RBC QN AUTO: 30.8 PG (ref 27–31)
MCHC RBC AUTO-ENTMCNC: 33.3 G/DL (ref 32–36)
MCV RBC AUTO: 93 FL (ref 82–98)
MICROALBUMIN UR-MCNC: 7 UG/ML
NONHDLC SERPL-MCNC: 91 MG/DL
NUCLEATED RBC (/100WBC) (SMH): 0 /100 WBC
PLATELET # BLD AUTO: 201 K/UL (ref 150–450)
PMV BLD AUTO: 9.6 FL (ref 9.2–12.9)
POTASSIUM SERPL-SCNC: 3.8 MMOL/L (ref 3.5–5.1)
PROT SERPL-MCNC: 6.5 GM/DL (ref 6–8.4)
RBC # BLD AUTO: 4.52 M/UL (ref 4.6–6.2)
RELATIVE EOSINOPHIL (SMH): 1.9 % (ref 0–8)
RELATIVE LYMPHOCYTE (SMH): 28.3 % (ref 18–48)
RELATIVE MONOCYTE (SMH): 10.9 % (ref 4–15)
RELATIVE NEUTROPHIL (SMH): 58 % (ref 38–73)
SODIUM SERPL-SCNC: 137 MMOL/L (ref 136–145)
TRIGL SERPL-MCNC: 97 MG/DL (ref 30–150)
TSH SERPL-ACNC: 0.97 UIU/ML (ref 0.34–5.6)
WBC # BLD AUTO: 8.27 K/UL (ref 3.9–12.7)

## 2025-06-06 PROCEDURE — 36415 COLL VENOUS BLD VENIPUNCTURE: CPT

## 2025-06-06 PROCEDURE — 82465 ASSAY BLD/SERUM CHOLESTEROL: CPT

## 2025-06-06 PROCEDURE — 83036 HEMOGLOBIN GLYCOSYLATED A1C: CPT

## 2025-06-06 PROCEDURE — 82043 UR ALBUMIN QUANTITATIVE: CPT

## 2025-06-06 PROCEDURE — 84443 ASSAY THYROID STIM HORMONE: CPT

## 2025-06-06 PROCEDURE — 85025 COMPLETE CBC W/AUTO DIFF WBC: CPT

## 2025-06-06 PROCEDURE — 82947 ASSAY GLUCOSE BLOOD QUANT: CPT

## 2025-06-11 ENCOUNTER — OFFICE VISIT (OUTPATIENT)
Dept: FAMILY MEDICINE | Facility: CLINIC | Age: 80
End: 2025-06-11
Payer: MEDICARE

## 2025-06-11 ENCOUNTER — RESULTS FOLLOW-UP (OUTPATIENT)
Dept: FAMILY MEDICINE | Facility: CLINIC | Age: 80
End: 2025-06-11

## 2025-06-11 VITALS
HEIGHT: 70 IN | OXYGEN SATURATION: 90 % | RESPIRATION RATE: 18 BRPM | WEIGHT: 223.31 LBS | DIASTOLIC BLOOD PRESSURE: 72 MMHG | TEMPERATURE: 98 F | BODY MASS INDEX: 31.97 KG/M2 | HEART RATE: 49 BPM | SYSTOLIC BLOOD PRESSURE: 132 MMHG

## 2025-06-11 DIAGNOSIS — Z79.82 ASPIRIN LONG-TERM USE: ICD-10-CM

## 2025-06-11 DIAGNOSIS — Z79.4 TYPE 2 DIABETES MELLITUS WITH DIABETIC POLYNEUROPATHY, WITH LONG-TERM CURRENT USE OF INSULIN: ICD-10-CM

## 2025-06-11 DIAGNOSIS — E11.42 TYPE 2 DIABETES MELLITUS WITH DIABETIC POLYNEUROPATHY, WITH LONG-TERM CURRENT USE OF INSULIN: ICD-10-CM

## 2025-06-11 DIAGNOSIS — E78.5 HYPERLIPIDEMIA, UNSPECIFIED HYPERLIPIDEMIA TYPE: Primary | ICD-10-CM

## 2025-06-11 DIAGNOSIS — I10 HYPERTENSION, ESSENTIAL: ICD-10-CM

## 2025-06-11 DIAGNOSIS — H61.21 IMPACTED CERUMEN OF RIGHT EAR: ICD-10-CM

## 2025-06-11 PROCEDURE — 3288F FALL RISK ASSESSMENT DOCD: CPT | Mod: CPTII,S$GLB,, | Performed by: NURSE PRACTITIONER

## 2025-06-11 PROCEDURE — 99999 PR PBB SHADOW E&M-EST. PATIENT-LVL IV: CPT | Mod: PBBFAC,,, | Performed by: NURSE PRACTITIONER

## 2025-06-11 PROCEDURE — 1159F MED LIST DOCD IN RCRD: CPT | Mod: CPTII,S$GLB,, | Performed by: NURSE PRACTITIONER

## 2025-06-11 PROCEDURE — 1101F PT FALLS ASSESS-DOCD LE1/YR: CPT | Mod: CPTII,S$GLB,, | Performed by: NURSE PRACTITIONER

## 2025-06-11 PROCEDURE — 3075F SYST BP GE 130 - 139MM HG: CPT | Mod: CPTII,S$GLB,, | Performed by: NURSE PRACTITIONER

## 2025-06-11 PROCEDURE — 1160F RVW MEDS BY RX/DR IN RCRD: CPT | Mod: CPTII,S$GLB,, | Performed by: NURSE PRACTITIONER

## 2025-06-11 PROCEDURE — 3078F DIAST BP <80 MM HG: CPT | Mod: CPTII,S$GLB,, | Performed by: NURSE PRACTITIONER

## 2025-06-11 PROCEDURE — 99213 OFFICE O/P EST LOW 20 MIN: CPT | Mod: S$GLB,,, | Performed by: NURSE PRACTITIONER

## 2025-06-11 NOTE — PROGRESS NOTES
SUBJECTIVE:      Patient ID: Fazal Wade is a 79 y.o. male.    Chief Complaint: Follow-up (B/P)    HPI  Is here for follow up for bp  Denies chest pain  Denies sob  Denies fever  Denies chills    Is taking over the counter insulin  Is bradycardia- asymptomatic  A1c is 8.3 from 8.9- so continue    Bp meds are 25 mg hctz daily  Hydralazine 25 mg bid  Coreg 25 mg bid  Norvasc 10 mg daily  And lisinopril 40 mg daily    Also has ear wax build up in right ear- will try and remove      Past Surgical History:   Procedure Laterality Date    COLONOSCOPY      EYE SURGERY       Family History   Problem Relation Name Age of Onset    No Known Problems Mother      Diabetes Father      Hypertension Father      Colon cancer Neg Hx      Colon polyps Neg Hx      Crohn's disease Neg Hx      Esophageal cancer Neg Hx      Ulcerative colitis Neg Hx      Stomach cancer Neg Hx        Social History     Socioeconomic History    Marital status: Single   Tobacco Use    Smoking status: Never    Smokeless tobacco: Never   Substance and Sexual Activity    Alcohol use: Never    Drug use: Never    Sexual activity: Not Currently     Social Drivers of Health     Financial Resource Strain: High Risk (8/10/2024)    Overall Financial Resource Strain (CARDIA)     Difficulty of Paying Living Expenses: Very hard   Food Insecurity: High Risk (9/19/2024)    Received from [facility] SDOH Screening     In the past 2 months, did you or others you live with eat smaller meals or skip meals because you didn't have money for food?: Yes   Transportation Needs: No Transportation Needs (5/7/2024)    PRAPARE - Transportation     Lack of Transportation (Medical): No     Lack of Transportation (Non-Medical): No   Physical Activity: Unknown (8/10/2024)    Exercise Vital Sign     Days of Exercise per Week: 1 day   Stress: Stress Concern Present (8/10/2024)    [employer] - Occupational Stress  "Questionnaire     Feeling of Stress : To some extent   Housing Stability: Unknown (8/10/2024)    Housing Stability Vital Sign     Unable to Pay for Housing in the Last Year: No     Current Medications[1]  Review of patient's allergies indicates:   Allergen Reactions    Levaquin [levofloxacin]       Past Medical History:   Diagnosis Date    Diabetes mellitus, type 2     Hyperlipidemia     Hypertension      Past Surgical History:   Procedure Laterality Date    COLONOSCOPY      EYE SURGERY         Review of Systems   HENT:  Positive for hearing loss.         Right ear wax excessive   All other systems reviewed and are negative.   OBJECTIVE:      Vitals:    06/11/25 0920   BP: 132/72   BP Location: Left arm   Patient Position: Sitting   Pulse: (!) 49   Resp: 18   Temp: 98.2 °F (36.8 °C)   SpO2: (!) 90%   Weight: 101.3 kg (223 lb 5.2 oz)   Height: 5' 10" (1.778 m)     Physical Exam  Vitals and nursing note reviewed.   Constitutional:       Appearance: Normal appearance. He is obese.   HENT:      Head: Normocephalic and atraumatic.      Right Ear: External ear normal. There is impacted cerumen.      Left Ear: Tympanic membrane, ear canal and external ear normal.      Ears:      Comments: Still impacted after irrigation  Eyes:      Pupils: Pupils are equal, round, and reactive to light.   Cardiovascular:      Rate and Rhythm: Normal rate and regular rhythm.      Pulses: Normal pulses.      Heart sounds: Normal heart sounds.   Pulmonary:      Effort: Pulmonary effort is normal.      Breath sounds: Normal breath sounds.   Musculoskeletal:      Cervical back: Normal range of motion.   Skin:     General: Skin is warm and dry.   Neurological:      General: No focal deficit present.      Mental Status: He is alert and oriented to person, place, and time. Mental status is at baseline.   Psychiatric:         Mood and Affect: Mood normal.         Behavior: Behavior normal.         Thought Content: Thought content normal.    "      Judgment: Judgment normal.      Comments: nonsuicidal      Assessment:       1. Hyperlipidemia, unspecified hyperlipidemia type    2. Hypertension, essential    3. Aspirin long-term use    4. Type 2 diabetes mellitus with diabetic polyneuropathy, with long-term current use of insulin        Plan:       Hyperlipidemia, unspecified hyperlipidemia type  -     Lipid Panel; Future; Expected date: 06/11/2025    Hypertension, essential  -     Comprehensive Metabolic Panel; Future; Expected date: 06/11/2025    Aspirin long-term use  -     CBC Auto Differential; Future; Expected date: 06/11/2025    Type 2 diabetes mellitus with diabetic polyneuropathy, with long-term current use of insulin  -     Hemoglobin A1C; Future; Expected date: 06/11/2025      Continue same insulin over the counter as previously stated in last note  Continue same bp meds as listed above  Follow up routine or sooner if needed  Get fasting labs done in 3 months or sooner if needed    No follow-ups on file.      6/11/2025 AKIRA Peres, FNP             [1]  Current Outpatient Medications   Medication Sig Dispense Refill    amLODIPine (NORVASC) 10 MG tablet Take 1 tablet (10 mg total) by mouth once daily. 90 tablet 1    ascorbic acid, vitamin C, (VITAMIN C) 100 MG tablet Take 100 mg by mouth once daily.      blood sugar diagnostic Strp 1 each by Misc.(Non-Drug; Combo Route) route 3 (three) times daily. 200 each 5    carvediloL (COREG) 25 MG tablet Take 1 tablet (25 mg total) by mouth 2 (two) times daily with meals. 180 tablet 1    hydrALAZINE (APRESOLINE) 25 MG tablet Take 1 tablet (25 mg total) by mouth every 12 (twelve) hours. 180 tablet 1    hydroCHLOROthiazide (HYDRODIURIL) 25 MG tablet Take 1 tablet (25 mg total) by mouth once daily. 90 tablet 1    insulin NPH-insulin regular, 70/30, 100 unit/mL (70-30) injection Inject into the skin 2 (two) times daily. OTC 6in am and 5 at pm      lisinopriL (PRINIVIL,ZESTRIL) 40 MG tablet Take 1  tablet (40 mg total) by mouth once daily. 90 tablet 1    magnesium 30 mg Tab Take by mouth once.      mycophenolate (CELLCEPT) 500 mg Tab Take 3 tablets (1,500 mg total) by mouth 2 (two) times daily. 2.5 tablets twice daily 180 tablet 1    simvastatin (ZOCOR) 20 MG tablet Take 1 tablet (20 mg total) by mouth every evening. 90 tablet 1    vit A/vit C/vit E/zinc/copper (PRESERVISION AREDS ORAL) Take by mouth.      insulin glargine U-100, Lantus, (LANTUS SOLOSTAR U-100 INSULIN) 100 unit/mL (3 mL) InPn pen Inject 10 Units into the skin every evening. (Patient not taking: Reported on 6/11/2025) 15 mL 1    insulin  unit/mL injection Inject 30 Units into the skin 2 (two) times daily before meals. Pt states 60 u sq am  and 40 u sq in pm. And its otc insulin. (Patient not taking: Reported on 6/11/2025) 10 mL 1     Current Facility-Administered Medications   Medication Dose Route Frequency Provider Last Rate Last Admin    methylPREDNISolone sodium succinate injection 40 mg  40 mg Intravenous Once Jacques Cruz MD

## 2025-07-02 ENCOUNTER — TELEPHONE (OUTPATIENT)
Dept: ENDOCRINOLOGY | Facility: CLINIC | Age: 80
End: 2025-07-02
Payer: MEDICARE